# Patient Record
Sex: FEMALE | Race: BLACK OR AFRICAN AMERICAN | NOT HISPANIC OR LATINO
[De-identification: names, ages, dates, MRNs, and addresses within clinical notes are randomized per-mention and may not be internally consistent; named-entity substitution may affect disease eponyms.]

---

## 2017-01-05 ENCOUNTER — APPOINTMENT (OUTPATIENT)
Dept: UROLOGY | Facility: CLINIC | Age: 37
End: 2017-01-05

## 2017-01-24 ENCOUNTER — APPOINTMENT (OUTPATIENT)
Dept: PHYSICAL MEDICINE AND REHAB | Facility: CLINIC | Age: 37
End: 2017-01-24

## 2017-01-24 ENCOUNTER — CHART COPY (OUTPATIENT)
Age: 37
End: 2017-01-24

## 2017-01-24 VITALS
OXYGEN SATURATION: 98 % | DIASTOLIC BLOOD PRESSURE: 70 MMHG | TEMPERATURE: 98.6 F | WEIGHT: 140 LBS | HEIGHT: 65 IN | SYSTOLIC BLOOD PRESSURE: 104 MMHG | BODY MASS INDEX: 23.32 KG/M2 | HEART RATE: 73 BPM

## 2017-02-21 ENCOUNTER — APPOINTMENT (OUTPATIENT)
Dept: PHYSICAL MEDICINE AND REHAB | Facility: CLINIC | Age: 37
End: 2017-02-21

## 2017-02-21 VITALS
TEMPERATURE: 98.6 F | SYSTOLIC BLOOD PRESSURE: 123 MMHG | OXYGEN SATURATION: 96 % | DIASTOLIC BLOOD PRESSURE: 84 MMHG | HEART RATE: 65 BPM

## 2017-02-21 DIAGNOSIS — M79.1 MYALGIA: ICD-10-CM

## 2017-10-10 ENCOUNTER — APPOINTMENT (OUTPATIENT)
Dept: PHYSICAL MEDICINE AND REHAB | Facility: CLINIC | Age: 37
End: 2017-10-10
Payer: MEDICAID

## 2017-10-10 VITALS
OXYGEN SATURATION: 99 % | HEART RATE: 67 BPM | TEMPERATURE: 98.3 F | SYSTOLIC BLOOD PRESSURE: 154 MMHG | DIASTOLIC BLOOD PRESSURE: 97 MMHG

## 2017-10-10 DIAGNOSIS — M25.879 OTHER SPECIFIED JOINT DISORDERS, UNSPECIFIED ANKLE AND FOOT: ICD-10-CM

## 2017-10-10 PROCEDURE — 99213 OFFICE O/P EST LOW 20 MIN: CPT

## 2017-11-14 ENCOUNTER — RX RENEWAL (OUTPATIENT)
Age: 37
End: 2017-11-14

## 2017-12-05 ENCOUNTER — APPOINTMENT (OUTPATIENT)
Dept: PHYSICAL MEDICINE AND REHAB | Facility: CLINIC | Age: 37
End: 2017-12-05
Payer: MEDICAID

## 2017-12-05 ENCOUNTER — CHART COPY (OUTPATIENT)
Age: 37
End: 2017-12-05

## 2017-12-05 PROCEDURE — 95874 GUIDE NERV DESTR NEEDLE EMG: CPT | Mod: LT

## 2017-12-05 PROCEDURE — 64644 CHEMODENERV 1 EXTREM 5/> MUS: CPT

## 2018-01-22 ENCOUNTER — APPOINTMENT (OUTPATIENT)
Dept: PHYSICAL MEDICINE AND REHAB | Facility: CLINIC | Age: 38
End: 2018-01-22
Payer: MEDICAID

## 2018-01-22 VITALS
DIASTOLIC BLOOD PRESSURE: 76 MMHG | OXYGEN SATURATION: 98 % | HEART RATE: 74 BPM | SYSTOLIC BLOOD PRESSURE: 137 MMHG | TEMPERATURE: 98.9 F

## 2018-01-22 PROCEDURE — 99213 OFFICE O/P EST LOW 20 MIN: CPT | Mod: GC

## 2018-03-08 ENCOUNTER — APPOINTMENT (OUTPATIENT)
Dept: PHYSICAL MEDICINE AND REHAB | Facility: CLINIC | Age: 38
End: 2018-03-08
Payer: MEDICAID

## 2018-03-08 VITALS
DIASTOLIC BLOOD PRESSURE: 84 MMHG | SYSTOLIC BLOOD PRESSURE: 126 MMHG | HEART RATE: 94 BPM | TEMPERATURE: 98.3 F | OXYGEN SATURATION: 99 %

## 2018-03-08 PROCEDURE — 99213 OFFICE O/P EST LOW 20 MIN: CPT

## 2018-05-03 ENCOUNTER — APPOINTMENT (OUTPATIENT)
Dept: PHYSICAL MEDICINE AND REHAB | Facility: CLINIC | Age: 38
End: 2018-05-03
Payer: MEDICARE

## 2018-05-03 VITALS
HEART RATE: 70 BPM | DIASTOLIC BLOOD PRESSURE: 81 MMHG | TEMPERATURE: 98 F | OXYGEN SATURATION: 99 % | SYSTOLIC BLOOD PRESSURE: 121 MMHG

## 2018-05-03 PROCEDURE — 95874 GUIDE NERV DESTR NEEDLE EMG: CPT

## 2018-05-03 PROCEDURE — 64644 CHEMODENERV 1 EXTREM 5/> MUS: CPT

## 2018-06-07 ENCOUNTER — CHART COPY (OUTPATIENT)
Age: 38
End: 2018-06-07

## 2018-06-12 ENCOUNTER — CHART COPY (OUTPATIENT)
Age: 38
End: 2018-06-12

## 2018-06-14 ENCOUNTER — APPOINTMENT (OUTPATIENT)
Dept: PHYSICAL MEDICINE AND REHAB | Facility: CLINIC | Age: 38
End: 2018-06-14
Payer: MEDICAID

## 2018-06-14 VITALS — DIASTOLIC BLOOD PRESSURE: 79 MMHG | OXYGEN SATURATION: 99 % | HEART RATE: 76 BPM | SYSTOLIC BLOOD PRESSURE: 114 MMHG

## 2018-06-14 PROCEDURE — 99213 OFFICE O/P EST LOW 20 MIN: CPT

## 2018-08-23 ENCOUNTER — APPOINTMENT (OUTPATIENT)
Dept: PHYSICAL MEDICINE AND REHAB | Facility: CLINIC | Age: 38
End: 2018-08-23
Payer: COMMERCIAL

## 2018-08-23 VITALS — HEART RATE: 81 BPM | SYSTOLIC BLOOD PRESSURE: 128 MMHG | DIASTOLIC BLOOD PRESSURE: 87 MMHG

## 2018-08-23 PROCEDURE — 95874 GUIDE NERV DESTR NEEDLE EMG: CPT

## 2018-08-23 PROCEDURE — 64644 CHEMODENERV 1 EXTREM 5/> MUS: CPT

## 2018-10-08 ENCOUNTER — APPOINTMENT (OUTPATIENT)
Dept: PHYSICAL MEDICINE AND REHAB | Facility: CLINIC | Age: 38
End: 2018-10-08
Payer: COMMERCIAL

## 2018-10-08 ENCOUNTER — RX RENEWAL (OUTPATIENT)
Age: 38
End: 2018-10-08

## 2018-10-08 VITALS — HEART RATE: 88 BPM | DIASTOLIC BLOOD PRESSURE: 96 MMHG | SYSTOLIC BLOOD PRESSURE: 136 MMHG

## 2018-10-08 PROCEDURE — 99213 OFFICE O/P EST LOW 20 MIN: CPT

## 2018-11-26 ENCOUNTER — APPOINTMENT (OUTPATIENT)
Dept: PHYSICAL MEDICINE AND REHAB | Facility: CLINIC | Age: 38
End: 2018-11-26

## 2019-01-14 ENCOUNTER — APPOINTMENT (OUTPATIENT)
Dept: PHYSICAL MEDICINE AND REHAB | Facility: CLINIC | Age: 39
End: 2019-01-14
Payer: COMMERCIAL

## 2019-01-14 VITALS
OXYGEN SATURATION: 97 % | SYSTOLIC BLOOD PRESSURE: 142 MMHG | HEART RATE: 75 BPM | TEMPERATURE: 97.5 F | DIASTOLIC BLOOD PRESSURE: 110 MMHG

## 2019-01-14 PROCEDURE — 64644 CHEMODENERV 1 EXTREM 5/> MUS: CPT

## 2019-01-14 PROCEDURE — 95874 GUIDE NERV DESTR NEEDLE EMG: CPT

## 2019-03-25 ENCOUNTER — APPOINTMENT (OUTPATIENT)
Dept: PHYSICAL MEDICINE AND REHAB | Facility: CLINIC | Age: 39
End: 2019-03-25
Payer: COMMERCIAL

## 2019-03-25 VITALS
SYSTOLIC BLOOD PRESSURE: 129 MMHG | TEMPERATURE: 98.5 F | DIASTOLIC BLOOD PRESSURE: 88 MMHG | HEART RATE: 75 BPM | OXYGEN SATURATION: 98 %

## 2019-03-25 PROCEDURE — 99213 OFFICE O/P EST LOW 20 MIN: CPT

## 2019-03-25 NOTE — REVIEW OF SYSTEMS
[Muscle Weakness] : muscle weakness [Difficulty Walking] : difficulty walking [Negative] : Gastrointestinal [Fever] : no fever [Incontinence] : no incontinence [Joint Pain] : no joint pain

## 2019-03-25 NOTE — HISTORY OF PRESENT ILLNESS
[FreeTextEntry1] : Patient is a 39-year-old female history of brainstem ICH with left spastic hemiparesis, ataxia who a underwent Botox injection on Jan. 14, 2019.. She states that she's able to  objects better with the left hand and she's more confident in the use of her left hand. Patient states that she is using her left hand in more activities and is able to extend her wrist easily. Patient will be restarting outpatient PT/OT.  (+) fall reported 2/2 w/c not locked.  Ambulates with rolling walker with assistance. (A) transfer, ADL.

## 2019-03-25 NOTE — ASSESSMENT
[FreeTextEntry1] : Patient is a 39 roll female history of brainstem ICH with ataxia, left hemiparesis underwent a Botox injection Jan. 14, 2019.  Good response to Botox injection.  Will continue the Botox injection protocol of Jan. 14, 2019 (100 units). Patient to restart outpatient physical therapy, see Rx.

## 2019-03-25 NOTE — PHYSICAL EXAM
[FreeTextEntry1] : General: Well developed female in no apparent distress. Patient is awake, alert, cooperative examination.\par Extremities: No pedal edema.\par \par Motor:\par Right upper extremity/right lower extremity: Active range of motion within functional limits with 5/5 motor power throughout.\par \par Left upper extremity: Significant dysmetria noted on FNF. Patient with isolated active wrist extension, active digit flexion and extension. Patient maintains her thumb a neutral to  extended position. Patient's thumb to digit opposition is intact and able to do this with wrist in extended position. Patient able to fully extend digits with full wrist extension. Hand  4+/5 motor power.  Thumb to digit opposition intact but slow.\par \par Tone: \par Left wrist flexors MAS = 0-1.\par Left FPL MAS = 0 with a wrist flexed, MAS = 0-1 with wrist extended\par Left FPB MAS = 0-1\par Left AP MAS = 0-1\par \par Functional status: Patient ambulated with rolling walker with assistance. Trunk ataxia and wide based gait. Patient ambulated with left foot flat initial contact, right foot flat initial contact with some mild toe strike.

## 2019-04-22 ENCOUNTER — APPOINTMENT (OUTPATIENT)
Dept: PHYSICAL MEDICINE AND REHAB | Facility: CLINIC | Age: 39
End: 2019-04-22
Payer: COMMERCIAL

## 2019-04-22 VITALS — DIASTOLIC BLOOD PRESSURE: 87 MMHG | SYSTOLIC BLOOD PRESSURE: 128 MMHG | HEART RATE: 88 BPM

## 2019-04-22 PROCEDURE — 95874 GUIDE NERV DESTR NEEDLE EMG: CPT

## 2019-04-22 PROCEDURE — 64644 CHEMODENERV 1 EXTREM 5/> MUS: CPT

## 2019-05-31 ENCOUNTER — RX RENEWAL (OUTPATIENT)
Age: 39
End: 2019-05-31

## 2019-06-12 ENCOUNTER — APPOINTMENT (OUTPATIENT)
Dept: PHYSICAL MEDICINE AND REHAB | Facility: CLINIC | Age: 39
End: 2019-06-12
Payer: MEDICARE

## 2019-06-12 VITALS — HEART RATE: 78 BPM | SYSTOLIC BLOOD PRESSURE: 128 MMHG | OXYGEN SATURATION: 98 % | DIASTOLIC BLOOD PRESSURE: 89 MMHG

## 2019-06-12 PROCEDURE — 99213 OFFICE O/P EST LOW 20 MIN: CPT

## 2019-06-12 NOTE — REVIEW OF SYSTEMS
[Muscle Weakness] : muscle weakness [Difficulty Walking] : difficulty walking [Negative] : Respiratory [Incontinence] : no incontinence [Fever] : no fever [Joint Pain] : no joint pain

## 2019-06-12 NOTE — ASSESSMENT
[FreeTextEntry1] : Patient is a 38 y/o female history of brainstem ICH with ataxia, left hemiparesis underwent a Botox injection April 22, 2019.  Good response to Botox injection.  Will continue the Botox injection protocol of April 22, 2019 (100 units). Patient to restart outpatient OT/PT, see Rx. Will get feedback from OT concerning adjustment in dose.

## 2019-06-12 NOTE — HISTORY OF PRESENT ILLNESS
[FreeTextEntry1] : Patient is a 39-year-old female history of brainstem ICH with left spastic hemiparesis, ataxia who underwent a Botox injection on April 22, 2019.. She states that she's able to  objects better with the left hand and no pain.  Patient states that she is using her left hand in more activities and is able to extend her wrist easily. No improvement in coordination. Patient will be restarting outpatient PT/OT.  No falls reported.  Ambulates with rolling walker with assistance. (A) transfer, ADL.

## 2019-06-12 NOTE — PHYSICAL EXAM
[FreeTextEntry1] : General: Well developed female in no apparent distress. Patient is awake, alert, cooperative examination.\par Extremities: No pedal edema.\par \par Motor:\par Right upper extremity/right lower extremity: Active range of motion within functional limits with 5/5 motor power throughout.\par \par Left upper extremity: Significant dysmetria noted on FNF. Patient with isolated active wrist extension, active digit flexion and extension. Patient maintains her thumb a neutral to  extended position. Patient's thumb to digit opposition is intact and able to do this with wrist in extended position. Patient able to fully extend digits with full wrist extension. Hand  4+/5 motor power.  Thumb to digit opposition intact but slow.\par Left shoulder flexion to 110deg, 4/5mp\par \par Tone: \par Left wrist flexors MAS = 0.\par Left FPL MAS = 0 with a wrist flexed, MAS = 0 with wrist extended\par Left FPB MAS = 0\par Left AP MAS = 0\par \par Functional status: Patient ambulated with rolling walker with assistance. Trunk ataxia and wide based gait. Patient ambulated with left foot flat initial contact, right foot flat initial contact with some mild toe strike.

## 2019-07-31 ENCOUNTER — APPOINTMENT (OUTPATIENT)
Dept: PHYSICAL MEDICINE AND REHAB | Facility: CLINIC | Age: 39
End: 2019-07-31

## 2019-09-19 ENCOUNTER — APPOINTMENT (OUTPATIENT)
Dept: PHYSICAL MEDICINE AND REHAB | Facility: CLINIC | Age: 39
End: 2019-09-19
Payer: MEDICARE

## 2019-09-19 ENCOUNTER — CHART COPY (OUTPATIENT)
Age: 39
End: 2019-09-19

## 2019-09-19 VITALS — DIASTOLIC BLOOD PRESSURE: 102 MMHG | OXYGEN SATURATION: 100 % | HEART RATE: 74 BPM | SYSTOLIC BLOOD PRESSURE: 149 MMHG

## 2019-09-19 PROCEDURE — 95874 GUIDE NERV DESTR NEEDLE EMG: CPT

## 2019-09-19 PROCEDURE — 64644 CHEMODENERV 1 EXTREM 5/> MUS: CPT

## 2019-11-04 ENCOUNTER — APPOINTMENT (OUTPATIENT)
Dept: PHYSICAL MEDICINE AND REHAB | Facility: CLINIC | Age: 39
End: 2019-11-04
Payer: MEDICARE

## 2019-11-04 VITALS — SYSTOLIC BLOOD PRESSURE: 133 MMHG | DIASTOLIC BLOOD PRESSURE: 87 MMHG | HEART RATE: 77 BPM | OXYGEN SATURATION: 98 %

## 2019-11-04 PROCEDURE — 99213 OFFICE O/P EST LOW 20 MIN: CPT

## 2019-11-04 NOTE — HISTORY OF PRESENT ILLNESS
[FreeTextEntry1] : Patient is a 39-year-old female history of brainstem ICH with left spastic hemiparesis, ataxia who underwent a Botox injection on Sept. 19, 2019.. She states that she's able to  objects better with the left hand and no pain.  Patient states that she is using her left hand in more activities and is able to extend her wrist easily. No improvement in coordination. Patient will be restarting outpatient PT/OT in Jan..  No falls reported.  Ambulates with rolling walker with assistance. (A) transfer, ADL.

## 2019-11-04 NOTE — ASSESSMENT
[FreeTextEntry1] : Patient is a 40 y/o female history of brainstem ICH with ataxia, left hemiparesis underwent a Botox injection Sept 19, 2019.  Good response to Botox injection, possible weakness at left FPL inhibiting pincer ..  Will decrease dose to FPL and increase AP. Prescription provided for a right heel lift 3/8" to improve clearance of the left lower extremity. Discuss with patient that the weighted walker is helping to stabilize her ataxia. New Botox injection protocol: \par \par Left FPL----- 20 units\par Left FPB----- 25 units\par Left AP------ 25 units\par Left FCR----- 15 units\par Left FCU----- 15 units\par \par Total ---------100 units

## 2019-11-04 NOTE — PHYSICAL EXAM
[FreeTextEntry1] : General: Well developed female in no apparent distress. Patient is awake, alert, cooperative examination.\par Extremities: No pedal edema.\par \par Motor:\par Right upper extremity/right lower extremity: Active range of motion within functional limits with 5/5 motor power throughout.\par \par Left upper extremity: Significant dysmetria noted on FNF. Patient with isolated active wrist extension, active digit flexion and extension. Patient maintains her thumb an adducted toneutral and extended at IP joint. Patient's thumb to digit opposition is intact and able to do this with wrist in extended position. Patient able to fully extend digits with full wrist extension. Hand  4+/5 motor power.  Thumb to digit opposition intact but slow. Left FPL 4/5mp \par Left shoulder flexion to 110deg, 4/5mp\par \par Tone: \par Left wrist flexors MAS = 0.\par Left FPL MAS = 0 with a wrist flexed, MAS = 0 with wrist extended\par Left FPB MAS = 0\par Left AP MAS = 0\par \par Functional status: Patient ambulated with rolling walker with assistance with left hinged AFO with good HS and some episodic toe catch at the initiation of swing phase on left. Toe catch eliminated with 3/8" lift on right. Trunk ataxia and wide based gait.

## 2020-01-02 ENCOUNTER — APPOINTMENT (OUTPATIENT)
Dept: PHYSICAL MEDICINE AND REHAB | Facility: CLINIC | Age: 40
End: 2020-01-02

## 2020-01-16 ENCOUNTER — APPOINTMENT (OUTPATIENT)
Dept: PHYSICAL MEDICINE AND REHAB | Facility: CLINIC | Age: 40
End: 2020-01-16
Payer: MEDICARE

## 2020-01-16 VITALS — HEART RATE: 73 BPM | OXYGEN SATURATION: 97 % | DIASTOLIC BLOOD PRESSURE: 94 MMHG | SYSTOLIC BLOOD PRESSURE: 136 MMHG

## 2020-01-16 PROCEDURE — 64644 CHEMODENERV 1 EXTREM 5/> MUS: CPT

## 2020-01-16 PROCEDURE — 95874 GUIDE NERV DESTR NEEDLE EMG: CPT

## 2020-03-26 ENCOUNTER — APPOINTMENT (OUTPATIENT)
Dept: PHYSICAL MEDICINE AND REHAB | Facility: CLINIC | Age: 40
End: 2020-03-26

## 2020-07-02 ENCOUNTER — APPOINTMENT (OUTPATIENT)
Dept: PHYSICAL MEDICINE AND REHAB | Facility: CLINIC | Age: 40
End: 2020-07-02
Payer: MEDICARE

## 2020-07-02 VITALS
TEMPERATURE: 98.1 F | HEART RATE: 71 BPM | OXYGEN SATURATION: 96 % | DIASTOLIC BLOOD PRESSURE: 88 MMHG | SYSTOLIC BLOOD PRESSURE: 146 MMHG

## 2020-07-02 PROCEDURE — 99213 OFFICE O/P EST LOW 20 MIN: CPT | Mod: GC

## 2020-07-02 NOTE — REVIEW OF SYSTEMS
[Difficulty Walking] : difficulty walking [Muscle Weakness] : muscle weakness [Negative] : Respiratory [Fever] : no fever [Incontinence] : no incontinence [Joint Pain] : no joint pain

## 2020-07-02 NOTE — ASSESSMENT
[FreeTextEntry1] : Patient is a 39 y/o female history of brainstem ICH with ataxia, left hemiparesis underwent a Botox injection Jan. 16, 2020.  Reported good response to Botox injection, but no 5 week f/u apt. Will continue Botox injection protocol: \par \par Left FPL----- 20 units\par Left FPB----- 25 units\par Left AP------ 25 units\par Left FCR----- 15 units\par Left FCU----- 15 units\par \par Total ---------100 units

## 2020-07-02 NOTE — HISTORY OF PRESENT ILLNESS
[FreeTextEntry1] : Patient is a 40-year-old female history of brainstem ICH with left spastic hemiparesis, ataxia who underwent a Botox injection on Jan. 16, 2020.. She states that she's able to  objects better with the left hand and no pain after her Botox injection. Patient was not able to f/u due to the COVID-19 pandemic.  Patient has noted some cramp/spasm episodes on the left hand over the past 3-4 weeks. Patient has participated in an outpatient PT during the COVID-19 pandemic. No falls reported. Patient ambulates with a rolling walker with assistance, assistance with transfers,  ADLs.

## 2020-07-09 ENCOUNTER — APPOINTMENT (OUTPATIENT)
Dept: PHYSICAL MEDICINE AND REHAB | Facility: CLINIC | Age: 40
End: 2020-07-09

## 2020-07-16 ENCOUNTER — APPOINTMENT (OUTPATIENT)
Dept: PHYSICAL MEDICINE AND REHAB | Facility: CLINIC | Age: 40
End: 2020-07-16
Payer: MEDICARE

## 2020-07-16 VITALS
HEART RATE: 79 BPM | TEMPERATURE: 96.8 F | SYSTOLIC BLOOD PRESSURE: 147 MMHG | DIASTOLIC BLOOD PRESSURE: 53 MMHG | OXYGEN SATURATION: 97 %

## 2020-07-16 PROCEDURE — 95874 GUIDE NERV DESTR NEEDLE EMG: CPT

## 2020-07-16 PROCEDURE — 64644 CHEMODENERV 1 EXTREM 5/> MUS: CPT

## 2020-08-25 ENCOUNTER — APPOINTMENT (OUTPATIENT)
Dept: PHYSICAL MEDICINE AND REHAB | Facility: CLINIC | Age: 40
End: 2020-08-25
Payer: MEDICARE

## 2020-08-25 VITALS — BODY MASS INDEX: 27.96 KG/M2 | WEIGHT: 168 LBS

## 2020-08-25 VITALS
DIASTOLIC BLOOD PRESSURE: 85 MMHG | TEMPERATURE: 97.6 F | HEART RATE: 87 BPM | SYSTOLIC BLOOD PRESSURE: 123 MMHG | OXYGEN SATURATION: 98 %

## 2020-08-25 PROCEDURE — 99214 OFFICE O/P EST MOD 30 MIN: CPT

## 2020-08-25 NOTE — REVIEW OF SYSTEMS
[Muscle Weakness] : muscle weakness [Negative] : Gastrointestinal [Skin Wound] : no skin wound [Joint Pain] : no joint pain [Fever] : no fever

## 2020-08-25 NOTE — ASSESSMENT
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 Eli Gu cannot walk in her home using any ambulation device. She requires a manual wheelchair so they can perform their mobility related activities of daily living (MRADL), like getting into the bathroom for hygiene, into the kitchen for eating and into the bedroom for resting. Patient requires an ultralight manual wheelchair because she cannot walk, has weakness and endurance issues that require her to need an ultra lightweight manual wheelchair with camber and an axle plate that brings the wheels forward to allow her to reach them effectively and without pain.

## 2020-08-25 NOTE — PHYSICAL EXAM
[FreeTextEntry1] : General: Well developed female in no apparent distress. Patient is awake, alert, cooperative examination.\par Extremities: No pedal edema.\par \par CN: no facial asymmetry, EOMI impaired on right lateral gaze. Tongue midline.\par Cerebellar: (+)nystagmus on vertical gaze. (+) dysmetria FNF (Lt>>Rt)\par \par Motor:\par Right upper extremity/right lower extremity: Active range of motion within functional limits with 5/5 motor power throughout. (+)TDO.\par \par Left upper extremity: Patient with isolated active wrist extension, active digit flexion and extension. Patient maintains her thumb an adducted position and extended at IP joint. Patient's thumb to digit opposition is intact and able to do this with wrist in extended position. Patient able to fully extend digits with full wrist extension. Hand  4+/5 motor power.  Thumb to digit opposition intact but slow. Left FPL 4/5mp \par Left shoulder flexion to 110deg, 4/5mp\par \par Tone: \par Left pronators MAS=1\par Left wrist flexors MAS = 0.\par Left FPL MAS = 0 with a wrist flexed, MAS = 0 with wrist extended\par Left FPB MAS = 0\par Left AP MAS = 0\par Left FDS MAS=1\par \par Sensory: decreased to LT LUE/LLE. Prop intact right thumb. Prop absent left wrist, thumb, and knee.\par MSR: +1/2 right KJ, +3 left KJ\par \par Functional status: Patient (A) sit to stand transfer. Trunk ataxia present.

## 2020-08-25 NOTE — HISTORY OF PRESENT ILLNESS
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 Patient lives with her cousin in a private house with a three-step entry. Patient is not ambulatory in the home and spends her time in a manual wheelchair. Patient requires assistance with wheelchair to commode transfers and wheelchair to bed transfers. Patient requires assistance with meal prep and self feeds. Patient independent with handwashing in the wheelchair. Patient requires assistance to negotiate the three-step entry to her home using a handrail. Home health aide is present 6 hours a day/5 days per week.

## 2020-11-02 ENCOUNTER — APPOINTMENT (OUTPATIENT)
Dept: PHYSICAL MEDICINE AND REHAB | Facility: CLINIC | Age: 40
End: 2020-11-02
Payer: MEDICARE

## 2020-11-02 VITALS
TEMPERATURE: 98.1 F | SYSTOLIC BLOOD PRESSURE: 136 MMHG | OXYGEN SATURATION: 99 % | HEART RATE: 80 BPM | DIASTOLIC BLOOD PRESSURE: 89 MMHG

## 2020-11-02 DIAGNOSIS — M21.42 FLAT FOOT [PES PLANUS] (ACQUIRED), RIGHT FOOT: ICD-10-CM

## 2020-11-02 DIAGNOSIS — L84 CORNS AND CALLOSITIES: ICD-10-CM

## 2020-11-02 DIAGNOSIS — M21.41 FLAT FOOT [PES PLANUS] (ACQUIRED), RIGHT FOOT: ICD-10-CM

## 2020-11-02 PROCEDURE — 99214 OFFICE O/P EST MOD 30 MIN: CPT

## 2020-11-02 RX ORDER — MONTELUKAST 10 MG/1
10 TABLET, FILM COATED ORAL
Qty: 30 | Refills: 0 | Status: ACTIVE | COMMUNITY
Start: 2020-10-20

## 2020-11-02 NOTE — HISTORY OF PRESENT ILLNESS
[FreeTextEntry1] : Patient is a 40-year-old female history of a brainstem ICH with left spastic hemiparesis, ataxia who underwent a Botox injection on July 16, 2020. Patient's main complaint today is pain at her left foot. Patient  reports a feeling that there is a 'small ball at the plantar surface by the metatarsal heads which gives her pain during ambulation. Patient also notes some toe curling of the left foot, however this causes minimal discomfort. Patient also reports that she's gained approximately 10 pounds over the past year and has noted some edema or at the left ankle which has made the brace tight and she's held using it for the past one week. No skin breakdown or redness reported. Patient was seen by vascular surgery and a Doppler is scheduled for tomorrow to rule out DVT. Patient feels that her Botox injection has worn off. She's been describing some cramping pain in her hands episodically and feels that her left hand is more clumsy and having difficulty grasping items with the left hand since the Botox wore off. Patient can use her left hand to zipper her jacket but mostly uses it to grossly hold objects for her right hand. Patient reports receiving her new wheelchair approximately one week ago which is comfortable but she's looking to getting the height increased.

## 2020-11-02 NOTE — PHYSICAL EXAM
[FreeTextEntry1] : General: Well developed female in no apparent distress. Patient is awake, alert, cooperative examination.\par Extremities: mild edema at left ankle, no eythema or warmth. (+)1cm callus at left 3rd metatarsal head which is tender to palpation. (+)pes planus (B)\par \par Motor:\par Right upper extremity/right lower extremity: Active range of motion within functional limits with 5/5 motor power throughout.\par \par Left upper extremity: Significant dysmetria noted on FNF. Patient with isolated active wrist extension, active digit flexion and extension. Patient maintains her thumb an adducted position and extended at IP joint. Patient's thumb to digit opposition is intact and able to do this with wrist in extended position. Patient able to fully extend digits with full wrist extension. Hand  4+/5 motor power.  Thumb to digit opposition intact but slow. Left FPL 4/5mp \par Left shoulder flexion to 110deg, 4/5mp. During TDO patientt maintains thumb in excessive adduction and IP joint flexed.\par \par Tone: \par Left wrist flexors MAS = 0.\par Left FPL MAS = 0 with a wrist flexed, MAS = 0 with wrist extended\par Left FPB MAS = 0\par Left AP MAS = 0\par Left FDS MAS=1\par \par Left toe flexors MAS=0 in PF; MAS=1+-2 in DF; MAS= 2+-3 with standing, hammer toe digit 4\par \par Functional status: Patient ambulated with rolling walker with assistance with left hinged AFO with good HS and clearance of LLE. Trunk ataxia and wide based gait. AFO mildly tight at ankle joints.

## 2020-11-02 NOTE — ASSESSMENT
[FreeTextEntry1] : Patient is a 40-year-old female history of a brainstem ICH with left spastic hemiparesis, ataxia with complaints of left foot pain at a callous noted on the plantar surface of the third metatarsal head. Prescription provided to add foam lining to the entire footplate of her AFO and for  to provide mild relief at the ankles, see RX. Referred patient to podiatry to assess callus on left foot. Patient with some complaints of toe curling which is causing minimal pain. Discussed option of Botox injection, however will hold option due to mild to no discomfort. Will continue the Botox injection protocol of July 16, 2020 (1 vial)

## 2020-11-12 ENCOUNTER — APPOINTMENT (OUTPATIENT)
Dept: PHYSICAL MEDICINE AND REHAB | Facility: CLINIC | Age: 40
End: 2020-11-12
Payer: MEDICARE

## 2020-11-12 VITALS
HEART RATE: 71 BPM | SYSTOLIC BLOOD PRESSURE: 106 MMHG | OXYGEN SATURATION: 95 % | DIASTOLIC BLOOD PRESSURE: 78 MMHG | TEMPERATURE: 96.7 F

## 2020-11-12 DIAGNOSIS — G81.14 SPASTIC HEMIPLEGIA AFFECTING LEFT NONDOMINANT SIDE: ICD-10-CM

## 2020-11-12 PROCEDURE — 64644 CHEMODENERV 1 EXTREM 5/> MUS: CPT

## 2020-11-12 PROCEDURE — 95874 GUIDE NERV DESTR NEEDLE EMG: CPT

## 2021-01-19 NOTE — REVIEW OF SYSTEMS
[Muscle Weakness] : muscle weakness [Difficulty Walking] : difficulty walking [Negative] : Gastrointestinal [Fever] : no fever [Skin Wound] : no skin wound Statement Selected

## 2021-11-24 ENCOUNTER — INPATIENT (INPATIENT)
Facility: HOSPITAL | Age: 41
LOS: 8 days | Discharge: HOME | DRG: 287 | End: 2021-12-03
Attending: INTERNAL MEDICINE | Admitting: INTERNAL MEDICINE
Payer: MEDICARE

## 2021-11-24 VITALS
OXYGEN SATURATION: 95 % | SYSTOLIC BLOOD PRESSURE: 137 MMHG | DIASTOLIC BLOOD PRESSURE: 99 MMHG | RESPIRATION RATE: 18 BRPM | TEMPERATURE: 98 F | HEART RATE: 88 BPM

## 2021-11-24 DIAGNOSIS — I25.41 CORONARY ARTERY ANEURYSM: ICD-10-CM

## 2021-11-24 DIAGNOSIS — R07.9 CHEST PAIN, UNSPECIFIED: ICD-10-CM

## 2021-11-24 DIAGNOSIS — Z98.890 OTHER SPECIFIED POSTPROCEDURAL STATES: Chronic | ICD-10-CM

## 2021-11-24 DIAGNOSIS — I10 ESSENTIAL (PRIMARY) HYPERTENSION: ICD-10-CM

## 2021-11-24 LAB
ALBUMIN SERPL ELPH-MCNC: 4.2 G/DL — SIGNIFICANT CHANGE UP (ref 3.3–5)
ALP SERPL-CCNC: 64 U/L — SIGNIFICANT CHANGE UP (ref 40–120)
ALT FLD-CCNC: 9 U/L — LOW (ref 10–45)
ANION GAP SERPL CALC-SCNC: 14 MMOL/L — SIGNIFICANT CHANGE UP (ref 5–17)
APPEARANCE UR: CLEAR — SIGNIFICANT CHANGE UP
APTT BLD: 35 SEC — SIGNIFICANT CHANGE UP (ref 27.5–35.5)
AST SERPL-CCNC: 13 U/L — SIGNIFICANT CHANGE UP (ref 10–40)
BILIRUB SERPL-MCNC: 0.4 MG/DL — SIGNIFICANT CHANGE UP (ref 0.2–1.2)
BILIRUB UR-MCNC: NEGATIVE — SIGNIFICANT CHANGE UP
BLD GP AB SCN SERPL QL: NEGATIVE — SIGNIFICANT CHANGE UP
BUN SERPL-MCNC: 9 MG/DL — SIGNIFICANT CHANGE UP (ref 7–23)
CALCIUM SERPL-MCNC: 9.5 MG/DL — SIGNIFICANT CHANGE UP (ref 8.4–10.5)
CHLORIDE SERPL-SCNC: 100 MMOL/L — SIGNIFICANT CHANGE UP (ref 96–108)
CK MB CFR SERPL CALC: 1 NG/ML — SIGNIFICANT CHANGE UP (ref 0–3.8)
CK SERPL-CCNC: 62 U/L — SIGNIFICANT CHANGE UP (ref 25–170)
CO2 SERPL-SCNC: 23 MMOL/L — SIGNIFICANT CHANGE UP (ref 22–31)
COLOR SPEC: SIGNIFICANT CHANGE UP
CREAT SERPL-MCNC: 0.84 MG/DL — SIGNIFICANT CHANGE UP (ref 0.5–1.3)
DIFF PNL FLD: ABNORMAL
FIBRINOGEN PPP-MCNC: 507 MG/DL — SIGNIFICANT CHANGE UP (ref 290–520)
GLUCOSE SERPL-MCNC: 79 MG/DL — SIGNIFICANT CHANGE UP (ref 70–99)
GLUCOSE UR QL: NEGATIVE — SIGNIFICANT CHANGE UP
HCG SERPL-ACNC: <2 MIU/ML — SIGNIFICANT CHANGE UP
HCT VFR BLD CALC: 36.1 % — SIGNIFICANT CHANGE UP (ref 34.5–45)
HGB BLD-MCNC: 11.1 G/DL — LOW (ref 11.5–15.5)
INR BLD: 1 RATIO — SIGNIFICANT CHANGE UP (ref 0.88–1.16)
KETONES UR-MCNC: ABNORMAL
LEUKOCYTE ESTERASE UR-ACNC: NEGATIVE — SIGNIFICANT CHANGE UP
MCHC RBC-ENTMCNC: 26.1 PG — LOW (ref 27–34)
MCHC RBC-ENTMCNC: 30.7 GM/DL — LOW (ref 32–36)
MCV RBC AUTO: 84.7 FL — SIGNIFICANT CHANGE UP (ref 80–100)
NITRITE UR-MCNC: NEGATIVE — SIGNIFICANT CHANGE UP
NRBC # BLD: 0 /100 WBCS — SIGNIFICANT CHANGE UP (ref 0–0)
NT-PROBNP SERPL-SCNC: 73 PG/ML — SIGNIFICANT CHANGE UP (ref 0–300)
PH UR: 6 — SIGNIFICANT CHANGE UP (ref 5–8)
PLATELET # BLD AUTO: 227 K/UL — SIGNIFICANT CHANGE UP (ref 150–400)
POTASSIUM SERPL-MCNC: 3.9 MMOL/L — SIGNIFICANT CHANGE UP (ref 3.5–5.3)
POTASSIUM SERPL-SCNC: 3.9 MMOL/L — SIGNIFICANT CHANGE UP (ref 3.5–5.3)
PROT SERPL-MCNC: 7.3 G/DL — SIGNIFICANT CHANGE UP (ref 6–8.3)
PROT UR-MCNC: ABNORMAL
PROTHROM AB SERPL-ACNC: 12 SEC — SIGNIFICANT CHANGE UP (ref 10.6–13.6)
RBC # BLD: 4.26 M/UL — SIGNIFICANT CHANGE UP (ref 3.8–5.2)
RBC # FLD: 14 % — SIGNIFICANT CHANGE UP (ref 10.3–14.5)
RH IG SCN BLD-IMP: POSITIVE — SIGNIFICANT CHANGE UP
SODIUM SERPL-SCNC: 137 MMOL/L — SIGNIFICANT CHANGE UP (ref 135–145)
SP GR SPEC: 1.04 — HIGH (ref 1.01–1.02)
TROPONIN T, HIGH SENSITIVITY RESULT: 8 NG/L — SIGNIFICANT CHANGE UP (ref 0–51)
UROBILINOGEN FLD QL: NEGATIVE — SIGNIFICANT CHANGE UP
WBC # BLD: 6.79 K/UL — SIGNIFICANT CHANGE UP (ref 3.8–10.5)
WBC # FLD AUTO: 6.79 K/UL — SIGNIFICANT CHANGE UP (ref 3.8–10.5)

## 2021-11-24 PROCEDURE — 93306 TTE W/DOPPLER COMPLETE: CPT | Mod: 26

## 2021-11-24 PROCEDURE — 71045 X-RAY EXAM CHEST 1 VIEW: CPT | Mod: 26

## 2021-11-24 PROCEDURE — 99223 1ST HOSP IP/OBS HIGH 75: CPT

## 2021-11-24 PROCEDURE — 99222 1ST HOSP IP/OBS MODERATE 55: CPT

## 2021-11-24 RX ORDER — INFLUENZA VIRUS VACCINE 15; 15; 15; 15 UG/.5ML; UG/.5ML; UG/.5ML; UG/.5ML
0.5 SUSPENSION INTRAMUSCULAR ONCE
Refills: 0 | Status: DISCONTINUED | OUTPATIENT
Start: 2021-11-24 | End: 2021-12-03

## 2021-11-24 RX ORDER — ASPIRIN/CALCIUM CARB/MAGNESIUM 324 MG
1 TABLET ORAL
Qty: 0 | Refills: 0 | DISCHARGE

## 2021-11-24 RX ORDER — ATENOLOL 25 MG/1
1 TABLET ORAL
Qty: 0 | Refills: 0 | DISCHARGE

## 2021-11-24 RX ORDER — ATENOLOL 25 MG/1
50 TABLET ORAL DAILY
Refills: 0 | Status: DISCONTINUED | OUTPATIENT
Start: 2021-11-24 | End: 2021-11-28

## 2021-11-24 RX ORDER — HEPARIN SODIUM 5000 [USP'U]/ML
5000 INJECTION INTRAVENOUS; SUBCUTANEOUS EVERY 8 HOURS
Refills: 0 | Status: DISCONTINUED | OUTPATIENT
Start: 2021-11-24 | End: 2021-11-29

## 2021-11-24 RX ORDER — AMLODIPINE BESYLATE 2.5 MG/1
1 TABLET ORAL
Qty: 0 | Refills: 0 | DISCHARGE

## 2021-11-24 RX ORDER — MONTELUKAST 4 MG/1
10 TABLET, CHEWABLE ORAL DAILY
Refills: 0 | Status: DISCONTINUED | OUTPATIENT
Start: 2021-11-24 | End: 2021-12-03

## 2021-11-24 RX ORDER — AMLODIPINE BESYLATE 2.5 MG/1
10 TABLET ORAL DAILY
Refills: 0 | Status: DISCONTINUED | OUTPATIENT
Start: 2021-11-24 | End: 2021-12-03

## 2021-11-24 RX ORDER — ASPIRIN/CALCIUM CARB/MAGNESIUM 324 MG
81 TABLET ORAL DAILY
Refills: 0 | Status: DISCONTINUED | OUTPATIENT
Start: 2021-11-24 | End: 2021-12-03

## 2021-11-24 RX ORDER — MONTELUKAST 4 MG/1
1 TABLET, CHEWABLE ORAL
Qty: 0 | Refills: 0 | DISCHARGE

## 2021-11-24 RX ORDER — SODIUM CHLORIDE 9 MG/ML
3 INJECTION INTRAMUSCULAR; INTRAVENOUS; SUBCUTANEOUS EVERY 8 HOURS
Refills: 0 | Status: DISCONTINUED | OUTPATIENT
Start: 2021-11-24 | End: 2021-12-03

## 2021-11-24 RX ADMIN — SODIUM CHLORIDE 3 MILLILITER(S): 9 INJECTION INTRAMUSCULAR; INTRAVENOUS; SUBCUTANEOUS at 20:33

## 2021-11-24 RX ADMIN — HEPARIN SODIUM 5000 UNIT(S): 5000 INJECTION INTRAVENOUS; SUBCUTANEOUS at 21:24

## 2021-11-24 RX ADMIN — AMLODIPINE BESYLATE 10 MILLIGRAM(S): 2.5 TABLET ORAL at 20:10

## 2021-11-24 RX ADMIN — Medication 0.1 MILLIGRAM(S): at 20:10

## 2021-11-24 NOTE — H&P ADULT - NSHPSOCIALHISTORY_GEN_ALL_CORE
Marital Status: Single  Occupation: Disability  Lives with:     Substance Use : Pt denies  Tobacco Usage:  Former smoker 1PPD x 15 years  Alcohol Usage: Pt denies Marital Status: Single  Occupation: Disability  Lives with: Caregiver (Cousin)    Substance Use : Pt denies  Tobacco Usage:  Former smoker 1PPD x 15 years  Alcohol Usage: Pt denies

## 2021-11-24 NOTE — H&P ADULT - PROBLEM SELECTOR PLAN 1
Continue asa 81 daily  Continue pre-op cardiac surgery workup  Dr. Casanova to review cardiac imaging from [facility]  Check TTE Continue asa 81 daily  Continue pre-op cardiac surgery workup  Dr. Casanova to review cardiac imaging from [facility]  Check TTE & PFTs  DVT prophylaxis on SQ Heparin

## 2021-11-24 NOTE — H&P ADULT - NSHPPHYSICALEXAM_GEN_ALL_CORE
General: NAD  HEENT:  NC/AT  Neuro: A&Ox4, no focal deficits noted  Respiratory: B/L BS CTA, no wheeze, no rhonchi noted  Cardiovascular: RRR, normal S1S2  GI: Abd soft, NT/ND, +BSx4Q   Peripheral Vascular:  B/L LE neg edema, 2+ peripheral pulses, no cyanosis, varicosities/PVD noted  Musculoskeletal: Moves all extremities, LUE/LLE 5/5 strength, RUE/RLE 3/5 strength  Psychiatric: Normal mood, normal affect observed  Skin: Normal exam to inspection and palpation. General: NAD  HEENT:  NC/AT  Neuro: A&Ox4, no focal deficits noted  Respiratory: B/L BS CTA, no wheeze, no rhonchi noted  Cardiovascular: RRR, normal S1S2  GI: Abd soft, NT/ND, +BSx4Q   Peripheral Vascular:  B/L LE neg edema, 2+ peripheral pulses, no cyanosis, varicosities/PVD noted  Musculoskeletal: Moves all extremities, LUE/LLE 5/5 strength, RUE/RLE 2/5 strength  Psychiatric: Normal mood, normal affect observed  Skin: Normal exam to inspection and palpation. General: NAD  HEENT:  NC/AT  Neuro: A&Ox4  Respiratory: B/L BS CTA, no wheeze, no rhonchi noted  Cardiovascular: RRR, normal S1S2  GI: Abd soft, NT/ND, +BSx4Q   Peripheral Vascular:  B/L LE neg edema, 2+ peripheral pulses, no cyanosis, varicosities/PVD noted  Musculoskeletal: Moves all extremities, LUE/LLE 5/5 strength, RUE/RLE 2/5 strength  Psychiatric: Normal mood, normal affect observed  Skin: Normal exam to inspection and palpation. General: NAD  HEENT:  NC/AT  Neuro: A&Ox3, +ataxic gait, +nystagmus   Respiratory: B/L BS CTA, no wheeze, no rhonchi noted  Cardiovascular: RRR, normal S1S2  GI: Abd soft, NT/ND, +BSx4Q   Peripheral Vascular:  B/L LE neg edema, 2+ peripheral pulses, no cyanosis, varicosities/PVD noted  Musculoskeletal: Moves all extremities, LUE/LLE 4/5 strength, RUE/RLE 2/5 strength  Psychiatric: Normal mood, normal affect observed  Skin: Normal exam to inspection and palpation.

## 2021-11-24 NOTE — H&P ADULT - HISTORY OF PRESENT ILLNESS
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 CTA: LMCA, originates from the left coronary sinus of Valsalva and thereafter appears to supply a large aneurysmal dilatation located anterior and superior to LV. Calcification and possible thrombus visualized within the wall of this dilated aneurysmal portion which appears to compress the left atrium. RCA: dominant, demonstrates dilatation in the proximal portions of the vessel but appears patent. Patient had recent admission for chest pain. She currently has no complaints and denies any CP, fever, chills, N/V, SOB, abdominal pain. 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 CTA: LMCA, originates from the left coronary sinus of Valsalva and thereafter appears to supply a large aneurysmal dilatation located anterior and superior to LV. Calcification and possible thrombus visualized within the wall of this dilated aneurysmal portion which appears to compress the left atrium. RCA: dominant, demonstrates dilatation in the proximal portions of the vessel but appears patent. Patient had recent admission for chest pain, MI ruled out. Patient now transferred to [facility] for CT Surgery evaluation. She currently has no complaints and denies any CP, fever, chills, N/V, SOB, abdominal pain. 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 CTA: LMCA, originates from the left coronary sinus of Valsalva and thereafter appears to supply a large aneurysmal dilatation located anterior and superior to LV. Calcification and possible thrombus visualized within the wall of this dilated aneurysmal portion which appears to compress the left atrium. RCA: dominant, demonstrates dilatation in the proximal portions of the vessel but appears patent. Patient had recent admission for chest pain, MI ruled out. Patient now transferred to [facility] for CT Surgery evaluation. She currently has no complaints and denies any CP, fever, chills, N/V, SOB, abdominal pain.

## 2021-11-24 NOTE — H&P ADULT - ASSESSMENT
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 CTA: LMCA, originates from the left coronary sinus of Valsalva and thereafter appears to supply a large aneurysmal dilatation located anterior and superior to LV. Calcification and possible thrombus visualized within the wall of this dilated aneurysmal portion which appears to compress the left atrium. RCA: dominant, demonstrates dilatation in the proximal portions of the vessel but appears patent. Patient had recent admission for chest pain, MI ruled out. Patient now transferred to [facility] for CT Surgery evaluation.  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 CTA: LMCA, originates from the left coronary sinus of Valsalva and thereafter appears to supply a large aneurysmal dilatation located anterior and superior to LV. Calcification and possible thrombus visualized within the wall of this dilated aneurysmal portion which appears to compress the left atrium. RCA: dominant, demonstrates dilatation in the proximal portions of the vessel but appears patent. Patient had recent admission for chest pain, MI ruled out. Patient now transferred to [facility] for CT Surgery evaluation.  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 CTA: LMCA, originates from the left coronary sinus of Valsalva and thereafter appears to supply a large aneurysmal dilatation located anterior and superior to LV. Calcification and possible thrombus visualized within the wall of this dilated aneurysmal portion which appears to compress the left atrium. RCA: dominant, demonstrates dilatation in the proximal portions of the vessel but appears patent. Patient had recent admission for chest pain, MI ruled out. Patient now transferred to [facility] for CT Surgery evaluation for possible coronary aneurysm repair.

## 2021-11-24 NOTE — H&P ADULT - ATTENDING COMMENTS
Pt seen and examined.  Coronary artery aneurysms.  ACS presentation.  Pt will be evaluated for possible transplant

## 2021-11-25 LAB
A1C WITH ESTIMATED AVERAGE GLUCOSE RESULT: 5.3 % — SIGNIFICANT CHANGE UP (ref 4–5.6)
ESTIMATED AVERAGE GLUCOSE: 105 MG/DL — SIGNIFICANT CHANGE UP (ref 68–114)
SARS-COV-2 RNA SPEC QL NAA+PROBE: SIGNIFICANT CHANGE UP
T3 SERPL-MCNC: 94 NG/DL — SIGNIFICANT CHANGE UP (ref 80–200)
T4 AB SER-ACNC: 6.7 UG/DL — SIGNIFICANT CHANGE UP (ref 4.6–12)
TSH SERPL-MCNC: 1.78 UIU/ML — SIGNIFICANT CHANGE UP (ref 0.27–4.2)

## 2021-11-25 PROCEDURE — 99231 SBSQ HOSP IP/OBS SF/LOW 25: CPT

## 2021-11-25 RX ADMIN — SODIUM CHLORIDE 3 MILLILITER(S): 9 INJECTION INTRAMUSCULAR; INTRAVENOUS; SUBCUTANEOUS at 06:07

## 2021-11-25 RX ADMIN — Medication 0.1 MILLIGRAM(S): at 05:25

## 2021-11-25 RX ADMIN — SODIUM CHLORIDE 3 MILLILITER(S): 9 INJECTION INTRAMUSCULAR; INTRAVENOUS; SUBCUTANEOUS at 21:16

## 2021-11-25 RX ADMIN — ATENOLOL 50 MILLIGRAM(S): 25 TABLET ORAL at 05:25

## 2021-11-25 RX ADMIN — SODIUM CHLORIDE 3 MILLILITER(S): 9 INJECTION INTRAMUSCULAR; INTRAVENOUS; SUBCUTANEOUS at 13:03

## 2021-11-25 RX ADMIN — Medication 0.1 MILLIGRAM(S): at 18:09

## 2021-11-25 RX ADMIN — HEPARIN SODIUM 5000 UNIT(S): 5000 INJECTION INTRAVENOUS; SUBCUTANEOUS at 12:15

## 2021-11-25 RX ADMIN — HEPARIN SODIUM 5000 UNIT(S): 5000 INJECTION INTRAVENOUS; SUBCUTANEOUS at 05:23

## 2021-11-25 RX ADMIN — MONTELUKAST 10 MILLIGRAM(S): 4 TABLET, CHEWABLE ORAL at 11:34

## 2021-11-25 RX ADMIN — Medication 81 MILLIGRAM(S): at 11:34

## 2021-11-25 RX ADMIN — HEPARIN SODIUM 5000 UNIT(S): 5000 INJECTION INTRAVENOUS; SUBCUTANEOUS at 22:02

## 2021-11-25 RX ADMIN — AMLODIPINE BESYLATE 10 MILLIGRAM(S): 2.5 TABLET ORAL at 05:25

## 2021-11-25 NOTE — PROGRESS NOTE ADULT - PROBLEM SELECTOR PLAN 1
Continue asa 81 daily  Continue pre-op cardiac surgery workup  Dr. Casanova to review cardiac imaging from [facility]  Check TTE & PFTs  DVT prophylaxis on SQ Heparin Continue asa 81 daily  Continue pre-op cardiac surgery workup  Dr. Casanova to review cardiac imaging from [facility]    DVT prophylaxis on SQ Heparin

## 2021-11-25 NOTE — PHYSICAL THERAPY INITIAL EVALUATION ADULT - GENERAL OBSERVATIONS, REHAB EVAL
42 yo F presents sitting OOB in chair in NAD, +IVL, +tele. See VSS @ 12:20. AOx4, pleasant and cooperative

## 2021-11-25 NOTE — PROGRESS NOTE ADULT - SUBJECTIVE AND OBJECTIVE BOX
VITAL SIGNS    Telemetry:      Vital Signs Last 24 Hrs  T(C): 37 (11-25-21 @ 04:16), Max: 37 (11-25-21 @ 04:16)  T(F): 98.6 (11-25-21 @ 04:16), Max: 98.6 (11-25-21 @ 04:16)  HR: 80 (11-25-21 @ 04:16) (80 - 92)  BP: 136/88 (11-25-21 @ 04:16) (136/88 - 143/93)  RR: 18 (11-25-21 @ 04:16) (18 - 18)  SpO2: 96% (11-25-21 @ 04:16) (95% - 99%)                   Daily     Daily       Bilirubin Total, Serum: 0.4 mg/dL (11-24 @ 19:36)    CAPILLARY BLOOD GLUCOSE                    PHYSICAL EXAM    Neurology: alert and oriented x 3, moves all extremities with no defecits  CV :  RRR  Lungs:   CTA B/L  Abdomen: soft, nontender, nondistended, positive bowel sounds, last bowel movement   Extremities:                                            VITAL SIGNS    Telemetry:  sr  80    Vital Signs Last 24 Hrs  T(C): 37 (11-25-21 @ 04:16), Max: 37 (11-25-21 @ 04:16)  T(F): 98.6 (11-25-21 @ 04:16), Max: 98.6 (11-25-21 @ 04:16)  HR: 80 (11-25-21 @ 04:16) (80 - 92)  BP: 136/88 (11-25-21 @ 04:16) (136/88 - 143/93)  RR: 18 (11-25-21 @ 04:16) (18 - 18)  SpO2: 96% (11-25-21 @ 04:16) (95% - 99%)                   Daily     Daily       Bilirubin Total, Serum: 0.4 mg/dL (11-24 @ 19:36)    CAPILLARY BLOOD GLUCOSE                    PHYSICAL EXAM  s   No chest pain  No SOB"  Neurology: alert and oriented x 3,  lt side weakness  CV :  RRR  Lungs:   CTA B/L  Abdomen: soft, nontender, nondistended, positive bowel sounds,   Extremities:

## 2021-11-25 NOTE — PHYSICAL THERAPY INITIAL EVALUATION ADULT - ADDITIONAL COMMENTS
Pt lives in pvt home with her cousin with 2 steps to enter and lives on first floor. Pt has home HHA 5 days a week 6 hours and on weekends she is with family.  Pt use RW with Min/Mod A x2, has L AFO, pt demonstrates ataxia w/ambulation.

## 2021-11-25 NOTE — PHYSICAL THERAPY INITIAL EVALUATION ADULT - PERTINENT HX OF CURRENT PROBLEM, REHAB EVAL
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 ED for abnormal outpatient Cardiac CTA results showing coronary aneurysm. CTA: LMCA, originates from the left coronary sinus of Valsalva and thereafter appears to supply a large aneurysmal dilatation located anterior and superior to LV. Patient now transferred to [facility] for CT Surgery evaluation.

## 2021-11-25 NOTE — OCCUPATIONAL THERAPY INITIAL EVALUATION ADULT - DIAGNOSIS, OT EVAL
Pt p/w impaired balance, strength, endurance, coordination, ataxia, motor control, vision impacting fxnl ind.

## 2021-11-25 NOTE — OCCUPATIONAL THERAPY INITIAL EVALUATION ADULT - UPPER BODY DRESSING, PREVIOUS LEVEL OF FUNCTION, OT EVAL
1603: Pt arrived to ED STAB 1. Triage completed to the best of my ability. E=1, M=3-6, V=1  1604: IV inserted on first attempt. Blood work obtained and sent to lab.  1619: Pt medicated with etomidate for RSI.  1621: Pt medicated with rocuronium for RSI. Second PIV inserted on first attempt Second set of blood cultures obtained and sent to lab.  1622: 7.5ETT passed on first attempt under CMAC guidance. Measured at 22cm at the lip. Visualized passing of ETT through vocal cords. Bilateral breath sounds. Good colorimetric color change. Good EtCO2 waveform.  1623: OG inserted.  1627: XR obtained.  1637: Pt to CT with Sanjana Caro RN.  1650: Pt returned to ED Stab 1.  1710: Pt's silver coated rico removed. New silver coated rico inserted under sterile technique. Urine specimen obtained and sent to lab. She appears to have a large amount of vaginal yeast. Dr. Jauregui aware.  1725: Lab called needing additional urine. Additional specimen sent.  1730: RT at bedside for ABG.  1739: Pt's GCS = 3. Hypertensive despite sedation. Dr. Jauregui aware.  1810: Pt's BP dropped significantly post hydralazine administration. Sedation titrated. I spoke to the pt's  and updated him appropriately.  1815: Attempted to call report to ICU; however, no nursing staff until 1900 for this pt. They will call me for report when nursing staff arrives for pt. ED Charge RN, Fannie, and ED MD made aware.  1845: GCS = E1, M6, V1 now that BP has dropped and sedation as been decreased. Pt reporting pain and is tearful. Dr. Jauregui ordered Fentanyl drip. She appears to have a stage 2 pressure ulcer to her buttocks after being log rolled. Received PCA pump from [facility]. Pending Fentanyl to come from Pharmacy and PCA key to come from [facility].  1856: Lab at bedside to obtain fungal culture.  1913: Clive Romero RN, set-up Fentanyl infusion.  1915: Verbal report to Jessica ICU RN. She had no questions upon completion of report.  1924: Brief  report to [facility] Duffy RN. Pt to go to floor via cart, Clive, RT, and EDT.   needed assist

## 2021-11-25 NOTE — PROGRESS NOTE ADULT - ASSESSMENT
This is a wheelchair bound 40 y/o female with PMHx of HTN, CVAx2 (2015) s/p trach and PEG with residual right sided weakness who presented to [facility] ED for abnormal outpatient Cardiac CTA results showing coronary aneurysm. CTA: LMCA, originates from the left coronary sinus of Valsalva and thereafter appears to supply a large aneurysmal dilatation located anterior and superior to LV. Calcification and possible thrombus visualized within the wall of this dilated aneurysmal portion which appears to compress the left atrium. RCA: dominant, demonstrates dilatation in the proximal portions of the vessel but appears patent. Patient had recent admission for chest pain, MI ruled out. Patient now transferred to [facility] for CT Surgery evaluation for possible coronary aneurysm repair.  This is a wheelchair bound 40 y/o female with PMHx of HTN, CVAx2 (2015) s/p trach and PEG with residual right sided weakness who presented to [facility] ED for abnormal outpatient Cardiac CTA results showing coronary aneurysm. CTA: LMCA, originates from the left coronary sinus of Valsalva and thereafter appears to supply a large aneurysmal dilatation located anterior and superior to LV. Calcification and possible thrombus visualized within the wall of this dilated aneurysmal portion which appears to compress the left atrium. RCA: dominant, demonstrates dilatation in the proximal portions of the vessel but appears patent. Patient had recent admission for chest pain, MI ruled out. Patient now transferred to [facility] for CT Surgery evaluation for possible coronary aneurysm repair.   11/25    OOB to leila arias

## 2021-11-25 NOTE — PHYSICAL THERAPY INITIAL EVALUATION ADULT - ACTIVE RANGE OF MOTION EXAMINATION, REHAB EVAL
Pt use L AFO at home/bilateral upper extremity Active ROM was WFL (within functional limits)/bilateral  lower extremity Active ROM was WFL (within functional limits)

## 2021-11-25 NOTE — OCCUPATIONAL THERAPY INITIAL EVALUATION ADULT - PRECAUTIONS/LIMITATIONS, REHAB EVAL
Calcification and possible thrombus visualized within the wall of this dilated aneurysmal portion which appears to compress the left atrium. RCA: dominant, demonstrates dilatation in the proximal portions of the vessel but appears patent. Patient had recent admission for chest pain, MI ruled out. Patient now transferred to [facility] for CT Surgery evaluation for possible coronary aneurysm repair. Calcification and possible thrombus visualized within the wall of this dilated aneurysmal portion which appears to compress the left atrium. RCA: dominant, demonstrates dilatation in the proximal portions of the vessel but appears patent. Patient had recent admission for chest pain, MI ruled out. Patient now transferred to [facility] for CT Surgery evaluation for possible coronary aneurysm repair./fall precautions

## 2021-11-25 NOTE — OCCUPATIONAL THERAPY INITIAL EVALUATION ADULT - BALANCE DISTURBANCE, IDENTIFIED IMPAIRMENT CONTRIBUTE, REHAB EVAL
impaired coordination/impaired motor control/impaired postural control/decreased sensation/decreased strength

## 2021-11-25 NOTE — OCCUPATIONAL THERAPY INITIAL EVALUATION ADULT - PERTINENT HX OF CURRENT PROBLEM, REHAB EVAL
42 y/o female with PMHx of HTN, CVAx2 (2015) s/p trach and PEG with residual right sided weakness who presented to [facility] ED for abnormal outpatient Cardiac CTA results showing coronary aneurysm. CTA: LMCA, originates from the left coronary sinus of Valsalva and thereafter appears to supply a large aneurysmal dilatation located anterior and superior to LV. 41F with PMHx of HTN, CVAx2 (2015) s/p trach and PEG with residual right sided weakness who presented to [facility] ED for abnormal outpatient Cardiac CTA results showing coronary aneurysm. CTA: LMCA, originates from the left coronary sinus of Valsalva and thereafter appears to supply a large aneurysmal dilatation located anterior and superior to LV.

## 2021-11-25 NOTE — OCCUPATIONAL THERAPY INITIAL EVALUATION ADULT - LIVES WITH, PROFILE
Pt lives in [place] +cousin. Pt has 3 SRIKANTH and first floor setup. Pt reports requiring assistance with ADLs and IADLs. Pt wears L ankle AFO.

## 2021-11-25 NOTE — OCCUPATIONAL THERAPY INITIAL EVALUATION ADULT - IMPAIRED TRANSFERS: BED/CHAIR, REHAB EVAL
ataxia/impaired balance/impaired coordination/impaired motor control/impaired postural control/decreased strength

## 2021-11-25 NOTE — PHYSICAL THERAPY INITIAL EVALUATION ADULT - GAIT DEVIATIONS NOTED, PT EVAL
decreased priyanka/increased time in double stance/footdrop/decreased step length/decreased weight-shifting ability

## 2021-11-26 LAB
ANION GAP SERPL CALC-SCNC: 10 MMOL/L — SIGNIFICANT CHANGE UP (ref 5–17)
BUN SERPL-MCNC: 11 MG/DL — SIGNIFICANT CHANGE UP (ref 7–23)
CALCIUM SERPL-MCNC: 8.9 MG/DL — SIGNIFICANT CHANGE UP (ref 8.4–10.5)
CHLORIDE SERPL-SCNC: 103 MMOL/L — SIGNIFICANT CHANGE UP (ref 96–108)
CO2 SERPL-SCNC: 25 MMOL/L — SIGNIFICANT CHANGE UP (ref 22–31)
CREAT SERPL-MCNC: 0.87 MG/DL — SIGNIFICANT CHANGE UP (ref 0.5–1.3)
GLUCOSE SERPL-MCNC: 100 MG/DL — HIGH (ref 70–99)
HCT VFR BLD CALC: 33.2 % — LOW (ref 34.5–45)
HGB BLD-MCNC: 10.2 G/DL — LOW (ref 11.5–15.5)
MCHC RBC-ENTMCNC: 25.6 PG — LOW (ref 27–34)
MCHC RBC-ENTMCNC: 30.7 GM/DL — LOW (ref 32–36)
MCV RBC AUTO: 83.2 FL — SIGNIFICANT CHANGE UP (ref 80–100)
NRBC # BLD: 0 /100 WBCS — SIGNIFICANT CHANGE UP (ref 0–0)
PLATELET # BLD AUTO: 208 K/UL — SIGNIFICANT CHANGE UP (ref 150–400)
POTASSIUM SERPL-MCNC: 3.8 MMOL/L — SIGNIFICANT CHANGE UP (ref 3.5–5.3)
POTASSIUM SERPL-SCNC: 3.8 MMOL/L — SIGNIFICANT CHANGE UP (ref 3.5–5.3)
RBC # BLD: 3.99 M/UL — SIGNIFICANT CHANGE UP (ref 3.8–5.2)
RBC # FLD: 13.8 % — SIGNIFICANT CHANGE UP (ref 10.3–14.5)
SODIUM SERPL-SCNC: 138 MMOL/L — SIGNIFICANT CHANGE UP (ref 135–145)
WBC # BLD: 5.87 K/UL — SIGNIFICANT CHANGE UP (ref 3.8–10.5)
WBC # FLD AUTO: 5.87 K/UL — SIGNIFICANT CHANGE UP (ref 3.8–10.5)

## 2021-11-26 PROCEDURE — 93010 ELECTROCARDIOGRAM REPORT: CPT

## 2021-11-26 PROCEDURE — 94010 BREATHING CAPACITY TEST: CPT | Mod: 26

## 2021-11-26 PROCEDURE — 99231 SBSQ HOSP IP/OBS SF/LOW 25: CPT

## 2021-11-26 PROCEDURE — 93454 CORONARY ARTERY ANGIO S&I: CPT | Mod: 26

## 2021-11-26 PROCEDURE — 99152 MOD SED SAME PHYS/QHP 5/>YRS: CPT

## 2021-11-26 RX ADMIN — HEPARIN SODIUM 5000 UNIT(S): 5000 INJECTION INTRAVENOUS; SUBCUTANEOUS at 05:11

## 2021-11-26 RX ADMIN — SODIUM CHLORIDE 3 MILLILITER(S): 9 INJECTION INTRAMUSCULAR; INTRAVENOUS; SUBCUTANEOUS at 05:38

## 2021-11-26 RX ADMIN — SODIUM CHLORIDE 3 MILLILITER(S): 9 INJECTION INTRAMUSCULAR; INTRAVENOUS; SUBCUTANEOUS at 21:30

## 2021-11-26 RX ADMIN — Medication 81 MILLIGRAM(S): at 10:52

## 2021-11-26 RX ADMIN — Medication 0.1 MILLIGRAM(S): at 05:11

## 2021-11-26 RX ADMIN — SODIUM CHLORIDE 3 MILLILITER(S): 9 INJECTION INTRAMUSCULAR; INTRAVENOUS; SUBCUTANEOUS at 14:05

## 2021-11-26 RX ADMIN — HEPARIN SODIUM 5000 UNIT(S): 5000 INJECTION INTRAVENOUS; SUBCUTANEOUS at 21:31

## 2021-11-26 RX ADMIN — ATENOLOL 50 MILLIGRAM(S): 25 TABLET ORAL at 05:11

## 2021-11-26 RX ADMIN — AMLODIPINE BESYLATE 10 MILLIGRAM(S): 2.5 TABLET ORAL at 05:11

## 2021-11-26 RX ADMIN — Medication 0.1 MILLIGRAM(S): at 18:41

## 2021-11-26 RX ADMIN — MONTELUKAST 10 MILLIGRAM(S): 4 TABLET, CHEWABLE ORAL at 11:24

## 2021-11-26 NOTE — PROGRESS NOTE ADULT - SUBJECTIVE AND OBJECTIVE BOX
Subjective: "Im ok "  Patient denies chest pain/shorntness of breath.  No headache.  No calf pain or tenderness     TELEMETRY: NSR 70s-80s    VITAL SIGNS    Vital Signs Last 24 Hrs  T(C): 37 (21 @ 11:54), Max: 37 (21 @ 11:54)  T(F): 98.6 (21 @ 11:54), Max: 98.6 (21 @ 11:54)  HR: 68 (21 @ 11:54) (68 - 82)  BP: 144/92 (21 @ 11:54) (120/80 - 144/92)  RR: 18 (21 @ 11:54) (18 - 18)  SpO2: 97% (21 @ 11:54) (97% - 100%)             @ 07:01  -   @ 07:00  --------------------------------------------------------  IN: 720 mL / OUT: 1250 mL / NET: -530 mL     @ 07:01  -   @ 12:24  --------------------------------------------------------  IN: 120 mL / OUT: 0 mL / NET: 120 mL       Daily Height in cm: 167.6 (2021 11:54)    Daily Weight in k.5 (2021 10:25)  Admit Wt: Drug Dosing Weight  Height (cm): 167.6 (2021 11:54)  Weight (kg): 69.5 (2021 11:54)  BMI (kg/m2): 24.7 (2021 11:54)  BSA (m2): 1.79 (2021 11:54)         PHYSICAL EXAM    Neurology: alert and oriented x 3, nonfocal, no gross deficits  CV : RRR +S1/S2  Lungs: CTA BL  RR easy, unlabored   Abdomen: soft, nontender, nondistended, positive bowel sounds  :  pt voiding without difficulty   Extremities:   DARDEN; +1 LE edema, neg calf tenderness.   PPP bilaterally        amLODIPine   Tablet 10 milliGRAM(s) Oral daily  aspirin enteric coated 81 milliGRAM(s) Oral daily  ATENolol  Tablet 50 milliGRAM(s) Oral daily  cloNIDine 0.1 milliGRAM(s) Oral two times a day  heparin   Injectable 5000 Unit(s) SubCutaneous every 8 hours  influenza   Vaccine 0.5 milliLiter(s) IntraMuscular once  montelukast 10 milliGRAM(s) Oral daily  sodium chloride 0.9% lock flush 3 milliLiter(s) IV Push every 8 hours

## 2021-11-26 NOTE — PROGRESS NOTE ADULT - PROBLEM SELECTOR PLAN 1
Continue asa 81 daily  Continue pre-op cardiac surgery workup  Cardiac cath today 11/26    DVT prophylaxis on SQ Heparin

## 2021-11-26 NOTE — PROGRESS NOTE ADULT - ASSESSMENT
This is a wheelchair bound 42 y/o female with PMHx of HTN, CVAx2 (2015) s/p trach and PEG with residual right sided weakness who presented to [facility] ED for abnormal outpatient Cardiac CTA results showing coronary aneurysm. CTA: LMCA, originates from the left coronary sinus of Valsalva and thereafter appears to supply a large aneurysmal dilatation located anterior and superior to LV. Calcification and possible thrombus visualized within the wall of this dilated aneurysmal portion which appears to compress the left atrium. RCA: dominant, demonstrates dilatation in the proximal portions of the vessel but appears patent. Patient had recent admission for chest pain, MI ruled out. Patient now transferred to [facility] for CT Surgery evaluation for possible coronary aneurysm repair.   11/25    OOB to chair,   vss    11/26 VSS overnight.  Will obtain cardiac cath today to evaluate coronaries. Chest pain/sob free overnight

## 2021-11-27 LAB
ANION GAP SERPL CALC-SCNC: 10 MMOL/L — SIGNIFICANT CHANGE UP (ref 5–17)
BUN SERPL-MCNC: 14 MG/DL — SIGNIFICANT CHANGE UP (ref 7–23)
CALCIUM SERPL-MCNC: 9.2 MG/DL — SIGNIFICANT CHANGE UP (ref 8.4–10.5)
CHLORIDE SERPL-SCNC: 104 MMOL/L — SIGNIFICANT CHANGE UP (ref 96–108)
CK MB CFR SERPL CALC: 1 NG/ML — SIGNIFICANT CHANGE UP (ref 0–3.8)
CK SERPL-CCNC: 66 U/L — SIGNIFICANT CHANGE UP (ref 25–170)
CO2 SERPL-SCNC: 25 MMOL/L — SIGNIFICANT CHANGE UP (ref 22–31)
CREAT SERPL-MCNC: 0.89 MG/DL — SIGNIFICANT CHANGE UP (ref 0.5–1.3)
GLUCOSE SERPL-MCNC: 101 MG/DL — HIGH (ref 70–99)
HCT VFR BLD CALC: 35 % — SIGNIFICANT CHANGE UP (ref 34.5–45)
HGB BLD-MCNC: 10.4 G/DL — LOW (ref 11.5–15.5)
MCHC RBC-ENTMCNC: 25.9 PG — LOW (ref 27–34)
MCHC RBC-ENTMCNC: 29.7 GM/DL — LOW (ref 32–36)
MCV RBC AUTO: 87.1 FL — SIGNIFICANT CHANGE UP (ref 80–100)
NRBC # BLD: 0 /100 WBCS — SIGNIFICANT CHANGE UP (ref 0–0)
PLATELET # BLD AUTO: 223 K/UL — SIGNIFICANT CHANGE UP (ref 150–400)
POTASSIUM SERPL-MCNC: 4.2 MMOL/L — SIGNIFICANT CHANGE UP (ref 3.5–5.3)
POTASSIUM SERPL-SCNC: 4.2 MMOL/L — SIGNIFICANT CHANGE UP (ref 3.5–5.3)
RBC # BLD: 4.02 M/UL — SIGNIFICANT CHANGE UP (ref 3.8–5.2)
RBC # FLD: 13.9 % — SIGNIFICANT CHANGE UP (ref 10.3–14.5)
SODIUM SERPL-SCNC: 139 MMOL/L — SIGNIFICANT CHANGE UP (ref 135–145)
TROPONIN T, HIGH SENSITIVITY RESULT: 9 NG/L — SIGNIFICANT CHANGE UP (ref 0–51)
WBC # BLD: 5.81 K/UL — SIGNIFICANT CHANGE UP (ref 3.8–10.5)
WBC # FLD AUTO: 5.81 K/UL — SIGNIFICANT CHANGE UP (ref 3.8–10.5)

## 2021-11-27 PROCEDURE — 93010 ELECTROCARDIOGRAM REPORT: CPT

## 2021-11-27 PROCEDURE — 99231 SBSQ HOSP IP/OBS SF/LOW 25: CPT

## 2021-11-27 RX ADMIN — MONTELUKAST 10 MILLIGRAM(S): 4 TABLET, CHEWABLE ORAL at 11:38

## 2021-11-27 RX ADMIN — HEPARIN SODIUM 5000 UNIT(S): 5000 INJECTION INTRAVENOUS; SUBCUTANEOUS at 06:24

## 2021-11-27 RX ADMIN — HEPARIN SODIUM 5000 UNIT(S): 5000 INJECTION INTRAVENOUS; SUBCUTANEOUS at 22:34

## 2021-11-27 RX ADMIN — SODIUM CHLORIDE 3 MILLILITER(S): 9 INJECTION INTRAMUSCULAR; INTRAVENOUS; SUBCUTANEOUS at 21:15

## 2021-11-27 RX ADMIN — Medication 0.1 MILLIGRAM(S): at 06:23

## 2021-11-27 RX ADMIN — SODIUM CHLORIDE 3 MILLILITER(S): 9 INJECTION INTRAMUSCULAR; INTRAVENOUS; SUBCUTANEOUS at 13:16

## 2021-11-27 RX ADMIN — Medication 81 MILLIGRAM(S): at 11:38

## 2021-11-27 RX ADMIN — Medication 0.1 MILLIGRAM(S): at 17:17

## 2021-11-27 RX ADMIN — ATENOLOL 50 MILLIGRAM(S): 25 TABLET ORAL at 06:23

## 2021-11-27 RX ADMIN — AMLODIPINE BESYLATE 10 MILLIGRAM(S): 2.5 TABLET ORAL at 06:23

## 2021-11-27 RX ADMIN — SODIUM CHLORIDE 3 MILLILITER(S): 9 INJECTION INTRAMUSCULAR; INTRAVENOUS; SUBCUTANEOUS at 05:52

## 2021-11-27 RX ADMIN — HEPARIN SODIUM 5000 UNIT(S): 5000 INJECTION INTRAVENOUS; SUBCUTANEOUS at 14:18

## 2021-11-27 NOTE — PROGRESS NOTE ADULT - PROBLEM SELECTOR PLAN 1
Continue asa 81 daily  Heart Failure to see for possible heart transplant eval  DVT prophylaxis on SQ Heparin

## 2021-11-27 NOTE — PROGRESS NOTE ADULT - SUBJECTIVE AND OBJECTIVE BOX
Subjective: "Im ok "  Patient denies chest pain/shortness of breath.  No pain at right radial cath site.      TELEMETRY:  NSR     VITAL SIGNS    Vital Signs Last 24 Hrs  T(C): 36.7 (11-27-21 @ 05:19), Max: 36.9 (11-26-21 @ 19:16)  T(F): 98 (11-27-21 @ 05:19), Max: 98.4 (11-26-21 @ 19:16)  HR: 74 (11-27-21 @ 05:19) (61 - 76)  BP: 132/90 (11-27-21 @ 05:19) (132/90 - 159/91)  RR: 18 (11-27-21 @ 05:19) (14 - 18)  SpO2: 99% (11-27-21 @ 05:19) (96% - 100%)            11-26 @ 07:01  -  11-27 @ 07:00  --------------------------------------------------------  IN: 120 mL / OUT: 600 mL / NET: -480 mL    11-27 @ 07:01  -  11-27 @ 11:57  --------------------------------------------------------  IN: 240 mL / OUT: 0 mL / NET: 240 mL       Daily     Daily   Admit Wt: Drug Dosing Weight  Height (cm): 167.6 (26 Nov 2021 11:54)  Weight (kg): 69.5 (26 Nov 2021 11:54)  BMI (kg/m2): 24.7 (26 Nov 2021 11:54)  BSA (m2): 1.79 (26 Nov 2021 11:54)      CAPILLARY BLOOD GLUCOSE          PHYSICAL EXAM    Neurology: alert and oriented x 3, nonfocal, no gross deficits  CV : RRR +S1/S2  Lungs: CTA BL.  RR easy, unlabored   Abdomen: soft, nontender, nondistended, positive bowel sounds  :  pt voiding without difficulty   Extremities: Right radial cath site c/d/i No s/s hematoma or bleeding.  DARDEN; +1 LE  edema, neg calf tenderness.   PPP bilaterally          amLODIPine   Tablet 10 milliGRAM(s) Oral daily  aspirin enteric coated 81 milliGRAM(s) Oral daily  ATENolol  Tablet 50 milliGRAM(s) Oral daily  cloNIDine 0.1 milliGRAM(s) Oral two times a day  heparin   Injectable 5000 Unit(s) SubCutaneous every 8 hours  influenza   Vaccine 0.5 milliLiter(s) IntraMuscular once  montelukast 10 milliGRAM(s) Oral daily  sodium chloride 0.9% lock flush 3 milliLiter(s) IV Push every 8 hours

## 2021-11-27 NOTE — PROVIDER CONTACT NOTE (OTHER) - BACKGROUND
right radial cath, found to have multiple coronary aneurysms. Not likely a surgical candidate.  Heart Failure/Transplant team to evaluate for possible transplant work up.

## 2021-11-27 NOTE — PROGRESS NOTE ADULT - ASSESSMENT
This is a wheelchair bound 40 y/o female with PMHx of HTN, CVAx2 (2015) s/p trach and PEG with residual right sided weakness who presented to [facility] ED for abnormal outpatient Cardiac CTA results showing coronary aneurysm. CTA: LMCA, originates from the left coronary sinus of Valsalva and thereafter appears to supply a large aneurysmal dilatation located anterior and superior to LV. Calcification and possible thrombus visualized within the wall of this dilated aneurysmal portion which appears to compress the left atrium. RCA: dominant, demonstrates dilatation in the proximal portions of the vessel but appears patent. Patient had recent admission for chest pain, MI ruled out. Patient now transferred to [facility] for CT Surgery evaluation for possible coronary aneurysm repair.   11/25    OOB to chair,   vss    11/26 VSS overnight.  Will obtain cardiac cath today to evaluate coronaries. Chest pain/sob free overnight    11/27 VSS.  No bleeding at right radial cath site.  Found to have multiple coronary aneurysms.  Not likely a surgical candidate.  Heart Failure/Transplant team to evaluate for possible transplant work up.

## 2021-11-28 LAB
ANION GAP SERPL CALC-SCNC: 12 MMOL/L — SIGNIFICANT CHANGE UP (ref 5–17)
BUN SERPL-MCNC: 14 MG/DL — SIGNIFICANT CHANGE UP (ref 7–23)
CALCIUM SERPL-MCNC: 9.3 MG/DL — SIGNIFICANT CHANGE UP (ref 8.4–10.5)
CHLORIDE SERPL-SCNC: 100 MMOL/L — SIGNIFICANT CHANGE UP (ref 96–108)
CO2 SERPL-SCNC: 25 MMOL/L — SIGNIFICANT CHANGE UP (ref 22–31)
CREAT SERPL-MCNC: 0.9 MG/DL — SIGNIFICANT CHANGE UP (ref 0.5–1.3)
GLUCOSE SERPL-MCNC: 91 MG/DL — SIGNIFICANT CHANGE UP (ref 70–99)
HCT VFR BLD CALC: 33.8 % — LOW (ref 34.5–45)
HGB BLD-MCNC: 10.2 G/DL — LOW (ref 11.5–15.5)
MCHC RBC-ENTMCNC: 25.4 PG — LOW (ref 27–34)
MCHC RBC-ENTMCNC: 30.2 GM/DL — LOW (ref 32–36)
MCV RBC AUTO: 84.3 FL — SIGNIFICANT CHANGE UP (ref 80–100)
NRBC # BLD: 0 /100 WBCS — SIGNIFICANT CHANGE UP (ref 0–0)
PLATELET # BLD AUTO: 233 K/UL — SIGNIFICANT CHANGE UP (ref 150–400)
POTASSIUM SERPL-MCNC: 4.2 MMOL/L — SIGNIFICANT CHANGE UP (ref 3.5–5.3)
POTASSIUM SERPL-SCNC: 4.2 MMOL/L — SIGNIFICANT CHANGE UP (ref 3.5–5.3)
RBC # BLD: 4.01 M/UL — SIGNIFICANT CHANGE UP (ref 3.8–5.2)
RBC # FLD: 14 % — SIGNIFICANT CHANGE UP (ref 10.3–14.5)
SODIUM SERPL-SCNC: 137 MMOL/L — SIGNIFICANT CHANGE UP (ref 135–145)
WBC # BLD: 6.24 K/UL — SIGNIFICANT CHANGE UP (ref 3.8–10.5)
WBC # FLD AUTO: 6.24 K/UL — SIGNIFICANT CHANGE UP (ref 3.8–10.5)

## 2021-11-28 PROCEDURE — 99223 1ST HOSP IP/OBS HIGH 75: CPT

## 2021-11-28 PROCEDURE — 99232 SBSQ HOSP IP/OBS MODERATE 35: CPT

## 2021-11-28 RX ORDER — ATENOLOL 25 MG/1
25 TABLET ORAL ONCE
Refills: 0 | Status: COMPLETED | OUTPATIENT
Start: 2021-11-28 | End: 2021-11-28

## 2021-11-28 RX ORDER — ATENOLOL 25 MG/1
75 TABLET ORAL DAILY
Refills: 0 | Status: DISCONTINUED | OUTPATIENT
Start: 2021-11-29 | End: 2021-12-03

## 2021-11-28 RX ADMIN — SODIUM CHLORIDE 3 MILLILITER(S): 9 INJECTION INTRAMUSCULAR; INTRAVENOUS; SUBCUTANEOUS at 21:27

## 2021-11-28 RX ADMIN — Medication 0.1 MILLIGRAM(S): at 17:31

## 2021-11-28 RX ADMIN — Medication 0.1 MILLIGRAM(S): at 06:22

## 2021-11-28 RX ADMIN — SODIUM CHLORIDE 3 MILLILITER(S): 9 INJECTION INTRAMUSCULAR; INTRAVENOUS; SUBCUTANEOUS at 14:58

## 2021-11-28 RX ADMIN — HEPARIN SODIUM 5000 UNIT(S): 5000 INJECTION INTRAVENOUS; SUBCUTANEOUS at 06:22

## 2021-11-28 RX ADMIN — HEPARIN SODIUM 5000 UNIT(S): 5000 INJECTION INTRAVENOUS; SUBCUTANEOUS at 14:23

## 2021-11-28 RX ADMIN — Medication 81 MILLIGRAM(S): at 14:23

## 2021-11-28 RX ADMIN — HEPARIN SODIUM 5000 UNIT(S): 5000 INJECTION INTRAVENOUS; SUBCUTANEOUS at 22:22

## 2021-11-28 RX ADMIN — SODIUM CHLORIDE 3 MILLILITER(S): 9 INJECTION INTRAMUSCULAR; INTRAVENOUS; SUBCUTANEOUS at 07:18

## 2021-11-28 RX ADMIN — ATENOLOL 25 MILLIGRAM(S): 25 TABLET ORAL at 14:23

## 2021-11-28 RX ADMIN — AMLODIPINE BESYLATE 10 MILLIGRAM(S): 2.5 TABLET ORAL at 06:22

## 2021-11-28 RX ADMIN — ATENOLOL 50 MILLIGRAM(S): 25 TABLET ORAL at 06:22

## 2021-11-28 RX ADMIN — MONTELUKAST 10 MILLIGRAM(S): 4 TABLET, CHEWABLE ORAL at 14:23

## 2021-11-28 NOTE — CONSULT NOTE ADULT - PROBLEM SELECTOR RECOMMENDATION 9
c/w clonidine 0.1 mg twice/day  c/w norvasc 10 mg daily  increase atenolol to 75 mg daily (goal HR 60-70)  goal SBP <120/80

## 2021-11-28 NOTE — PROGRESS NOTE ADULT - ASSESSMENT
This is a wheelchair bound 42 y/o female with PMHx of HTN, CVAx2 (2015) s/p trach and PEG with residual right sided weakness who presented to [facility] ED for abnormal outpatient Cardiac CTA results showing coronary aneurysm. CTA: LMCA, originates from the left coronary sinus of Valsalva and thereafter appears to supply a large aneurysmal dilatation located anterior and superior to LV. Calcification and possible thrombus visualized within the wall of this dilated aneurysmal portion which appears to compress the left atrium. RCA: dominant, demonstrates dilatation in the proximal portions of the vessel but appears patent. Patient had recent admission for chest pain, MI ruled out. Patient now transferred to [facility] for CT Surgery evaluation for possible coronary aneurysm repair.   11/25    OOB to chair,   vss    11/26 VSS overnight.  Will obtain cardiac cath today to evaluate coronaries. Chest pain/sob free overnight    11/27 VSS.  No bleeding at right radial cath site.  Found to have multiple coronary aneurysms.  Not likely a surgical candidate.  Heart Failure/Transplant team to evaluate for possible transplant work up.   11/28 VSS atenolol increased  to 75mg per  heart failure not a Transplant candidate.

## 2021-11-28 NOTE — PROGRESS NOTE ADULT - PROBLEM SELECTOR PLAN 1
Continue asa 81 daily  Heart Failure to see for possible heart transplant eval- not a candidate  DVT prophylaxis on SQ Heparin  medial management

## 2021-11-28 NOTE — CONSULT NOTE ADULT - ASSESSMENT
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 Found to have large saccular aneurysm of LM with LCX and LAD originating from this with medium aneursym of RCA in setting of normal biventricular function. Appears compensated and euvolemic but hypertensive. Unfortunately does not appear to be a transplant candidate for this indication given her overall debility but will d/w colleagues

## 2021-11-28 NOTE — CONSULT NOTE ADULT - SUBJECTIVE AND OBJECTIVE BOX
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 Per patient and her cousin (Marko), she has had uncontrolled blood pressure for a number of years. Was diagnosed with RAJI and uses a mouthguard. Had reportedly been having some chest tightness over past 10 years with no clear workup. In  had left sided weakness with coordination issues and was found to have a brainstem bleed. Had required a trach (now decannulated) and PEG (now removed) during that period of time. Since, she has required assistive devices to ambulate along with 1 person assist. Able to perform IADLs. She had worsening chest tightness this past month and went to the hospital  with a negative lab workup with plan for outpatient cardiac CT which she underwent  which was abnormal and was told to come to the ER. Cardiac CT showed large aneurysmal dilatation located anterior and superior to LV with calcification/thrombus within wall and compressing LA as well as proximal dilatation of RCA. TTE showed normal biventricular function. [facility]  showed large saccular aneurysm of proximal LM with LAD and LCx coming off confluence and medium localized aneurysm of RCA. Per CT surgery, determined to not be a CABG candidate and Dr. Casanova requested possibility of transplant.     Currently denies CP/SOB while at rest. Denies orthopnea/PND. Reports previously would get chest tightness with sensation of "feeling her heart." Lives with her cousin (Marko) as well as others who support her. Unclear if she had Kawasaki's as a child but reports was told that it was treated with ASA.     PAST MEDICAL & SURGICAL HISTORY:  HTN (hypertension)    CVA (cerebrovascular accident)    History of tracheostomy    FAMILY HISTORY:      Home Medications:  amLODIPine 10 mg oral tablet: 1 tab(s) orally once a day (2021 18:48)  aspirin 81 mg oral delayed release tablet: 1 tab(s) orally once a day (2021 18:48)  atenolol 50 mg oral tablet: 1 tab(s) orally once a day (2021 18:48)  cloNIDine 0.1 mg oral tablet: 1 tab(s) orally 2 times a day (2021 18:48)  montelukast 10 mg oral tablet: 1 tab(s) orally once a day (2021 18:48)      Medications:  amLODIPine   Tablet 10 milliGRAM(s) Oral daily  aspirin enteric coated 81 milliGRAM(s) Oral daily  ATENolol  Tablet 25 milliGRAM(s) Oral once  cloNIDine 0.1 milliGRAM(s) Oral two times a day  heparin   Injectable 5000 Unit(s) SubCutaneous every 8 hours  influenza   Vaccine 0.5 milliLiter(s) IntraMuscular once  montelukast 10 milliGRAM(s) Oral daily  sodium chloride 0.9% lock flush 3 milliLiter(s) IV Push every 8 hours      Review of systems:  10 point review of systems completed and are negative unless noted in HPI    Vitals:  T(C): 36.7 (21 @ 04:50), Max: 36.9 (21 @ 19:11)  HR: 75 (21 @ 04:50) (68 - 87)  BP: 147/83 (21 @ 04:50) (124/84 - 147/83)  BP(mean): 97 (21 @ 19:11) (97 - 97)  RR: 18 (21 @ 04:50) (18 - 18)  SpO2: 100% (21 @ 04:50) (97% - 100%)    Daily     Daily Weight in k.8 (2021 18:23)        I&O's Summary    2021 07:01  -  2021 07:00  --------------------------------------------------------  IN: 760 mL / OUT: 510 mL / NET: 250 mL    Physical Exam:  Appearance: No Acute Distress  HEENT: PERRL; nystagmus  Neck: no JVP  Cardiovascular: Normal S1 S2, No murmurs/rubs/gallops  Respiratory: Clear to auscultation bilaterally  Gastrointestinal: Soft, Non-tender	  Skin: No cyanosis	  Neurologic: strength intact b/l; dysmetria present on L   Extremities: No LE edema  Psychiatry: A & O x 3, Mood & affect appropriate    Labs:                        10.2   6.24  )-----------( 233      ( 2021 07:15 )             33.8         137  |  100  |  14  ----------------------------<  91  4.2   |  25  |  0.90    Ca    9.3      2021 07:15        CARDIAC MARKERS ( 2021 19:30 )  x     / x     / 66 U/L / x     / 1.0 ng/mL      EK21 - NSR, nl axis, good RWP

## 2021-11-28 NOTE — CONSULT NOTE ADULT - PROBLEM SELECTOR RECOMMENDATION 2
large saccular aneurysm of LM   no current angina with negative troponin or ECG changes  would consider anticoagulation   consider vascular medicine consult (Dr. Steven)  c/w ASA 8 1mg daily  unfortunately does not appear to be a transplant candidate for this indication given her overall debility but will d/w colleagues

## 2021-11-29 LAB
ANION GAP SERPL CALC-SCNC: 12 MMOL/L — SIGNIFICANT CHANGE UP (ref 5–17)
BUN SERPL-MCNC: 14 MG/DL — SIGNIFICANT CHANGE UP (ref 7–23)
CALCIUM SERPL-MCNC: 9.3 MG/DL — SIGNIFICANT CHANGE UP (ref 8.4–10.5)
CHLORIDE SERPL-SCNC: 100 MMOL/L — SIGNIFICANT CHANGE UP (ref 96–108)
CO2 SERPL-SCNC: 25 MMOL/L — SIGNIFICANT CHANGE UP (ref 22–31)
CREAT SERPL-MCNC: 0.98 MG/DL — SIGNIFICANT CHANGE UP (ref 0.5–1.3)
GLUCOSE SERPL-MCNC: 93 MG/DL — SIGNIFICANT CHANGE UP (ref 70–99)
HCT VFR BLD CALC: 35.3 % — SIGNIFICANT CHANGE UP (ref 34.5–45)
HGB BLD-MCNC: 10.8 G/DL — LOW (ref 11.5–15.5)
MCHC RBC-ENTMCNC: 26.1 PG — LOW (ref 27–34)
MCHC RBC-ENTMCNC: 30.6 GM/DL — LOW (ref 32–36)
MCV RBC AUTO: 85.3 FL — SIGNIFICANT CHANGE UP (ref 80–100)
MRSA PCR RESULT.: SIGNIFICANT CHANGE UP
NRBC # BLD: 0 /100 WBCS — SIGNIFICANT CHANGE UP (ref 0–0)
PLATELET # BLD AUTO: 238 K/UL — SIGNIFICANT CHANGE UP (ref 150–400)
POTASSIUM SERPL-MCNC: 4.5 MMOL/L — SIGNIFICANT CHANGE UP (ref 3.5–5.3)
POTASSIUM SERPL-MCNC: 5.4 MMOL/L — HIGH (ref 3.5–5.3)
POTASSIUM SERPL-SCNC: 4.5 MMOL/L — SIGNIFICANT CHANGE UP (ref 3.5–5.3)
POTASSIUM SERPL-SCNC: 5.4 MMOL/L — HIGH (ref 3.5–5.3)
RBC # BLD: 4.14 M/UL — SIGNIFICANT CHANGE UP (ref 3.8–5.2)
RBC # FLD: 13.9 % — SIGNIFICANT CHANGE UP (ref 10.3–14.5)
S AUREUS DNA NOSE QL NAA+PROBE: SIGNIFICANT CHANGE UP
SODIUM SERPL-SCNC: 137 MMOL/L — SIGNIFICANT CHANGE UP (ref 135–145)
WBC # BLD: 6.49 K/UL — SIGNIFICANT CHANGE UP (ref 3.8–10.5)
WBC # FLD AUTO: 6.49 K/UL — SIGNIFICANT CHANGE UP (ref 3.8–10.5)

## 2021-11-29 PROCEDURE — 99232 SBSQ HOSP IP/OBS MODERATE 35: CPT

## 2021-11-29 RX ORDER — APIXABAN 2.5 MG/1
5 TABLET, FILM COATED ORAL
Refills: 0 | Status: DISCONTINUED | OUTPATIENT
Start: 2021-11-29 | End: 2021-11-29

## 2021-11-29 RX ORDER — ATORVASTATIN CALCIUM 80 MG/1
20 TABLET, FILM COATED ORAL AT BEDTIME
Refills: 0 | Status: DISCONTINUED | OUTPATIENT
Start: 2021-11-29 | End: 2021-12-03

## 2021-11-29 RX ORDER — APIXABAN 2.5 MG/1
2.5 TABLET, FILM COATED ORAL
Refills: 0 | Status: DISCONTINUED | OUTPATIENT
Start: 2021-11-29 | End: 2021-11-30

## 2021-11-29 RX ADMIN — MONTELUKAST 10 MILLIGRAM(S): 4 TABLET, CHEWABLE ORAL at 12:13

## 2021-11-29 RX ADMIN — ATENOLOL 75 MILLIGRAM(S): 25 TABLET ORAL at 06:25

## 2021-11-29 RX ADMIN — Medication 81 MILLIGRAM(S): at 12:13

## 2021-11-29 RX ADMIN — SODIUM CHLORIDE 3 MILLILITER(S): 9 INJECTION INTRAMUSCULAR; INTRAVENOUS; SUBCUTANEOUS at 06:43

## 2021-11-29 RX ADMIN — ATORVASTATIN CALCIUM 20 MILLIGRAM(S): 80 TABLET, FILM COATED ORAL at 21:25

## 2021-11-29 RX ADMIN — Medication 0.1 MILLIGRAM(S): at 17:22

## 2021-11-29 RX ADMIN — HEPARIN SODIUM 5000 UNIT(S): 5000 INJECTION INTRAVENOUS; SUBCUTANEOUS at 06:43

## 2021-11-29 RX ADMIN — AMLODIPINE BESYLATE 10 MILLIGRAM(S): 2.5 TABLET ORAL at 06:24

## 2021-11-29 RX ADMIN — HEPARIN SODIUM 5000 UNIT(S): 5000 INJECTION INTRAVENOUS; SUBCUTANEOUS at 14:13

## 2021-11-29 RX ADMIN — APIXABAN 2.5 MILLIGRAM(S): 2.5 TABLET, FILM COATED ORAL at 18:00

## 2021-11-29 RX ADMIN — SODIUM CHLORIDE 3 MILLILITER(S): 9 INJECTION INTRAMUSCULAR; INTRAVENOUS; SUBCUTANEOUS at 14:08

## 2021-11-29 RX ADMIN — SODIUM CHLORIDE 3 MILLILITER(S): 9 INJECTION INTRAMUSCULAR; INTRAVENOUS; SUBCUTANEOUS at 20:55

## 2021-11-29 RX ADMIN — Medication 0.1 MILLIGRAM(S): at 06:24

## 2021-11-29 NOTE — PROGRESS NOTE ADULT - SUBJECTIVE AND OBJECTIVE BOX
VITAL SIGNS    Telemetry:      Vital Signs Last 24 Hrs  T(C): 36.7 (21 @ 04:40), Max: 36.9 (21 @ 13:43)  T(F): 98.1 (21 @ 04:40), Max: 98.4 (21 @ 13:43)  HR: 77 (21 @ 04:40) (71 - 84)  BP: 133/86 (21 @ 04:40) (114/60 - 146/95)  RR: 18 (21 @ 04:40) (18 - 18)  SpO2: 100% (21 @ 04:40) (96% - 100%)                   Daily     Daily Weight in k.4 (2021 11:49)        CAPILLARY BLOOD GLUCOSE                                  PHYSICAL EXAM  s   No chest  pain   No sob"  Neurology: alert and oriented x 3lt arm eakness   LE  BL weankess  CV :  RRR  Lungs:   CTA B/L  Abdomen: soft, nontender, nondistended, positive bowel sounds,  Extremities:     no edema  no calf tenderness

## 2021-11-29 NOTE — PROGRESS NOTE ADULT - PROBLEM SELECTOR PLAN 1
Continue asa 81 daily  DVT prophylaxis on SQ Heparin  medial management   Dr Camara to see patient    tuesday and take over care  eliquis 5 q12

## 2021-11-29 NOTE — CONSULT NOTE ADULT - SUBJECTIVE AND OBJECTIVE BOX
CHIEF COMPLAINT:Patient is a 41y old  Female who presents with a chief complaint of coronary aneurysm (2021 16:38)      HISTORY OF PRESENT ILLNESS:    41 female with history as below, HTN, CVA ? Kawasaki disease with large coronary aneurysm   pt denies any cp or sob now  no dizziness  no syncope     PAST MEDICAL & SURGICAL HISTORY:  HTN (hypertension)    CVA (cerebrovascular accident)    History of tracheostomy            MEDICATIONS:  amLODIPine   Tablet 10 milliGRAM(s) Oral daily  apixaban 2.5 milliGRAM(s) Oral two times a day  aspirin enteric coated 81 milliGRAM(s) Oral daily  ATENolol  Tablet 75 milliGRAM(s) Oral daily  cloNIDine 0.1 milliGRAM(s) Oral two times a day      montelukast 10 milliGRAM(s) Oral daily          influenza   Vaccine 0.5 milliLiter(s) IntraMuscular once  sodium chloride 0.9% lock flush 3 milliLiter(s) IV Push every 8 hours      FAMILY HISTORY:      Non-contributory    SOCIAL HISTORY:    No tobacco, drugs or etoh    Allergies    No Known Allergies    Intolerances    	    REVIEW OF SYSTEMS:  as above  The rest of the 14 points ROS reviewed and except above they are unremarkable.        PHYSICAL EXAM:  T(C): 36.6 (21 @ 13:01), Max: 36.7 (21 @ 19:23)  HR: 71 (21 @ 17:22) (71 - 89)  BP: 128/74 (21 @ 17:22) (124/74 - 133/86)  RR: 18 (21 @ 17:22) (18 - 18)  SpO2: 98% (21 @ 17:22) (98% - 100%)  Wt(kg): --  I&O's Summary    2021 07:  -  2021 07:00  --------------------------------------------------------  IN: 740 mL / OUT: 0 mL / NET: 740 mL    2021 07:  -  2021 18:26  --------------------------------------------------------  IN: 660 mL / OUT: 300 mL / NET: 360 mL        JVP: Normal  Neck: supple  Lung: clear   CV: S1 S2 , Murmur:  Abd: soft  Ext: No edema  neuro: Awake / alert  Psych: flat affect  Skin: normal     LABS/DATA:    TELEMETRY: 	    ECG:  	   	  CARDIAC MARKERS:    < from: TTE with Doppler (w/Cont) (21 @ 18:57) >  Conclusions:  1. Normal mitral valve. Mild mitral regurgitation.  2. Aortic valve leaflet morphology not well visualized.  Minimal aortic regurgitation.  3. Normal left ventricular systolic function. No segmental  wall motion abnormalities.  Endocardial visualization  enhanced with intravenous injection of Ultrasonic Enhancing  Agent (Definity).  4. Normal right ventricular size and function.  ------------------------------------------------------------------------  Confirmed on  2021 - 10:07:21 by Garry Curtis M.D.    < end of copied text >  < from: Cardiac Catheterization (21 @ 15:56) >  Name:           LIZZIE ESPINOZA   :            1980   (41 years)   Gender:         female   MR#:            [id number]   MPI#:           [id number]   Patient Class:  Inpatient     Cath Lab Report    Diagnostic Cardiologist:       Vince Kim MD   Fellow:                        French Stone MD   Monitor:                       Robles Cook MD   Scrub:                         Valentino Mccarthy CVT   Nurse:                         Ryan Rust RN   Referring Physician:           Robby Casanova MD   Referring Physician:           Reid Mcneill MD     Procedures Performed   Procedures:               1.    Art Access - R radial artery     2.    Left Coronary Angio   3.    Right Coronary Angio   4.    Sonosite - Diagnostic     Indications:               Myocardial infarction without ST elevation  (NSTEMI)    Diagnostic Conclusions:   Large left main saccular anuerysm with proximal LAD and Cx fusiform  anuerysmal segements.  Reccomend ongoing medical management with antiplatelet agents, blood  pressure control. CTS to continue consideration for  surgical management of large aneurysmal disease, if amenable. Findings  relayed to patient, patient's cardiologist and CTS.    Presentation:   wheelchair bound 40 y/o female with PMHx of HTN, CVAx2 (2015) s/p  trach and PEG with residual right  sided weakness who presented to [facility] ED for abnormal outpatient  Cardiac CTA results showing coronary  aneurysm.     Procedure Narrative:   The risks andalternatives of the procedures and conscious sedation  were explained to the patient and informed consent was  obtained. The patient was brought to the cath lab and placed on the  exam table.  Access   Right radial artery:   The puncture site was infiltrated with 1% Lidocaine. Vascular access  was obtained using modified seldinger technique.    Diagnostic Findings:     Coronary Angiography   The coronary circulation is right dominant.      LM   Proximal left main: Angiography showed a large saccular aneurysm.  Cannot rule out aneurysmal thrombosis. Large saccular  anuerysm with LAD and Cx coming off of this confluence. .      Patient: LIZZIE ESPINOZA            MRN: [id number]  Study Date: 2021   03:56 PM      Page 1 of 3          LAD   Left anterior descending artery: Angiography shows mild  atherosclerosis. Angiography showed a medium fusiform aneurysm.    CX   Circumflex: Angiography showed a medium fusiform aneurysm.      RCA   Right coronary artery: Angiography shows mild atherosclerosis.  Angiography showed a medium localized aneurysm.    History and Risk Factors:   Hypertension:                               Yes   Prior MI:                                   No   Family History of Premature CAD:            No   Cerebrovascular Disease:                    Yes   Prior Stroke:                               Yes   Peripheral Arterial Disease:                Yes   Chronic Lung Disease:                       No   Prior CABG:                                 No     X-Ray:   Diagnostic Flouro time:      6.6 min.                    Exam record  DAP:  Total Flouro Time:           6.6 min.                    Exam total  DAP:    Exam Start Time:   03:56 PM   Exam End Time:     04:21 PM   Exam Duration:     25 min     Contrast:   Description                     Dose         Unit        Serial No.   Omnipaque                           74.000   mL     < end of copied text >                      9 <<== - @ 19:30                              10.8   6.49  )-----------( 238      ( 2021 06:19 )             35.3     -29    x   |  x   |  x   ----------------------------<  x   4.5   |  x   |  x     Ca    9.3      2021 06:18      proBNP: Serum Pro-Brain Natriuretic Peptide: 73 pg/mL (11-24 @ 19:36)    Lipid Profile:   HgA1c:   TSH:

## 2021-11-29 NOTE — CONSULT NOTE ADULT - ASSESSMENT
Large coronary aneurysm   not surgical candidate as per CTS  not transplant candidate as per CHF   agree with a/c and asa given large aneurysm   though traditionally warfarin is drug of choice , there are literature supporting use of NOAC , would increase dose of eliquis to 5 bid   will get vascular cardiology for their opinion as well     HTN  stable    CVA  on asa, a/c  add statin      Advanced care planning was discussed with patient and family.  Risks, benefits and alternatives of the cardiac treatments and medical therapy including procedures were discussed in detail and all questions were answered. Importance of compliance with medical therapy and lifestyle modification to improve cardiovascular health were addressed. Appropriate forms and patient educational materials were reviewed. 30 minutes face to face spent.

## 2021-11-29 NOTE — PROGRESS NOTE ADULT - SUBJECTIVE AND OBJECTIVE BOX
Subjective: Patient seen and examined resting in bed.     Medications:  amLODIPine   Tablet 10 milliGRAM(s) Oral daily  apixaban 5 milliGRAM(s) Oral two times a day  aspirin enteric coated 81 milliGRAM(s) Oral daily  ATENolol  Tablet 75 milliGRAM(s) Oral daily  cloNIDine 0.1 milliGRAM(s) Oral two times a day  influenza   Vaccine 0.5 milliLiter(s) IntraMuscular once  montelukast 10 milliGRAM(s) Oral daily  sodium chloride 0.9% lock flush 3 milliLiter(s) IV Push every 8 hours      Vitals:  Vital Signs Last 24 Hours  T(C): 36.6 (21 @ 13:01), Max: 36.7 (21 @ 19:23)  HR: 89 (21 @ 13:01) (71 - 89)  BP: 124/74 (21 @ 13:01) (114/60 - 133/86)  RR: 18 (21 @ 13:01) (18 - 18)  SpO2: 98% (21 @ 13:01) (96% - 100%)    Weight in k.4 ( @ 11:49)    I&O's Summary    2021 07:  -  2021 07:00  --------------------------------------------------------  IN: 740 mL / OUT: 0 mL / NET: 740 mL    2021 07:  -  2021 16:38  --------------------------------------------------------  IN: 420 mL / OUT: 300 mL / NET: 120 mL      Physical Exam   General: No distress. Comfortable.  Neck: Neck supple. JVP not elevated. No masses  Chest: Clear to auscultation bilaterally  CV: Normal S1 and S2. No murmurs, rub, or gallops. Radial pulses normal.  Abdomen: Soft, non-distended, non-tender  Neurology: Alert and oriented times     Labs:                        10.8   6.49  )-----------( 238      ( 2021 06:19 )             35.3     -    x   |  x   |  x   ----------------------------<  x   4.5   |  x   |  x     Ca    9.3      2021 06:18        CARDIAC MARKERS ( 2021 19:30 )  x     / x     / 66 U/L / x     / 1.0 ng/mL      Serum Pro-Brain Natriuretic Peptide: 73 pg/mL ( @ 19:36)  Creatine Kinase, Serum: 66 U/L (21 @ 19:30)  Creatine Kinase, Serum: 66 U/L (21 @ 19:30)

## 2021-11-29 NOTE — PROGRESS NOTE ADULT - ASSESSMENT
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 Found to have large saccular aneurysm of LM with LCX and LAD originating from this with medium aneursym of RCA in setting of normal biventricular function. Appears compensated and euvolemic but hypertensive. Unfortunately due to her overall debility and frailty she is not a transplant candidate. Will be signing off at this time.

## 2021-11-29 NOTE — PROGRESS NOTE ADULT - ASSESSMENT
This is a wheelchair bound 40 y/o female with PMHx of HTN, CVAx2 (2015) s/p trach and PEG with residual right sided weakness who presented to [facility] ED for abnormal outpatient Cardiac CTA results showing coronary aneurysm. CTA: LMCA, originates from the left coronary sinus of Valsalva and thereafter appears to supply a large aneurysmal dilatation located anterior and superior to LV. Calcification and possible thrombus visualized within the wall of this dilated aneurysmal portion which appears to compress the left atrium. RCA: dominant, demonstrates dilatation in the proximal portions of the vessel but appears patent. Patient had recent admission for chest pain, MI ruled out. Patient now transferred to [facility] for CT Surgery evaluation for possible coronary aneurysm repair.   11/25    OOB to chair,   vss    11/26 VSS overnight.  Will obtain cardiac cath today to evaluate coronaries. Chest pain/sob free overnight    11/27 VSS.  No bleeding at right radial cath site.  Found to have multiple coronary aneurysms.  Not likely a surgical candidate.  Heart Failure/Transplant team to evaluate for possible transplant work up.   11/28 VSS atenolol increased  to 75mg per  heart failure not a Transplant candidate.  11/29     vss     eliquis started   ,  OOB w/ asst

## 2021-11-29 NOTE — CONSULT NOTE ADULT - ASSESSMENT
Large coronary aneurysm   not surgical candidate as per CTS  not transplant candidate as per CHF    a/c and asa given large aneurysm   c/w eliquis   vascular cardiology consult    HTN  stable  c/w meds    CVA  on asa, a/c  statin

## 2021-11-29 NOTE — CONSULT NOTE ADULT - SUBJECTIVE AND OBJECTIVE BOX
Patient is a 41y old  Female who presents with a chief complaint of coronary aneurysm (29 Nov 2021 12:09)      INTERVAL HPI/OVERNIGHT EVENTS:    Medications:MEDICATIONS  (STANDING):  amLODIPine   Tablet 10 milliGRAM(s) Oral daily  apixaban 5 milliGRAM(s) Oral two times a day  aspirin enteric coated 81 milliGRAM(s) Oral daily  ATENolol  Tablet 75 milliGRAM(s) Oral daily  cloNIDine 0.1 milliGRAM(s) Oral two times a day  heparin   Injectable 5000 Unit(s) SubCutaneous every 8 hours  influenza   Vaccine 0.5 milliLiter(s) IntraMuscular once  montelukast 10 milliGRAM(s) Oral daily  sodium chloride 0.9% lock flush 3 milliLiter(s) IV Push every 8 hours    MEDICATIONS  (PRN):      Allergies: Allergies    No Known Allergies    Intolerances          FAMILY HISTORY:        PAST MEDICAL & SURGICAL HISTORY:  HTN (hypertension)    CVA (cerebrovascular accident)    History of tracheostomy        REVIEW OF SYSTEMS:  CONSTITUTIONAL: No fever, weight loss, or fatigue  EYES: No eye pain, visual disturbances, or discharge  ENMT:  No difficulty hearing, tinnitus, vertigo; No sinus or throat pain  NECK: No pain or stiffness  BREASTS: No pain, masses, or nipple discharge  RESPIRATORY: No cough, wheezing, chills or hemoptysis; No shortness of breath  CARDIOVASCULAR: No chest pain, palpitations, dizziness, or leg swelling  GASTROINTESTINAL: No abdominal or epigastric pain. No nausea, vomiting, or hematemesis; No diarrhea or constipation. No melena or hematochezia.  GENITOURINARY: No dysuria, frequency, hematuria, or incontinence  NEUROLOGICAL: No headaches, memory loss, loss of strength, numbness, or tremors  SKIN: No itching, burning, rashes, or lesions   LYMPH NODES: No enlarged glands  ENDOCRINE: No heat or cold intolerance; No hair loss  MUSCULOSKELETAL: No joint pain or swelling; No muscle, back, or extremity pain  PSYCHIATRIC: No depression, anxiety, mood swings, or difficulty sleeping  HEME/LYMPH: No easy bruising, or bleeding gums  ALLERY AND IMMUNOLOGIC: No hives or eczema    T(C): 36.7 (11-29-21 @ 04:40), Max: 36.9 (11-28-21 @ 13:43)  HR: 77 (11-29-21 @ 04:40) (71 - 84)  BP: 133/86 (11-29-21 @ 04:40) (114/60 - 146/95)  RR: 18 (11-29-21 @ 04:40) (18 - 18)  SpO2: 100% (11-29-21 @ 04:40) (96% - 100%)  Wt(kg): --Vital Signs Last 24 Hrs  T(C): 36.7 (29 Nov 2021 04:40), Max: 36.9 (28 Nov 2021 13:43)  T(F): 98.1 (29 Nov 2021 04:40), Max: 98.4 (28 Nov 2021 13:43)  HR: 77 (29 Nov 2021 04:40) (71 - 84)  BP: 133/86 (29 Nov 2021 04:40) (114/60 - 146/95)  BP(mean): 98 (28 Nov 2021 19:23) (98 - 98)  RR: 18 (29 Nov 2021 04:40) (18 - 18)  SpO2: 100% (29 Nov 2021 04:40) (96% - 100%)  I&O's Summary    28 Nov 2021 07:01  -  29 Nov 2021 07:00  --------------------------------------------------------  IN: 740 mL / OUT: 0 mL / NET: 740 mL    29 Nov 2021 07:01  -  29 Nov 2021 12:32  --------------------------------------------------------  IN: 240 mL / OUT: 0 mL / NET: 240 mL        PHYSICAL EXAM:  GENERAL: NAD, well-groomed, well-developed  HEAD:  Atraumatic, Normocephalic  EYES: EOMI, PERRLA, conjunctiva and sclera clear  ENMT: No tonsillar erythema, exudates, or enlargement; Moist mucous membranes, Good dentition, No lesions  NECK: Supple, No JVD, Normal thyroid  NERVOUS SYSTEM:  Alert & Oriented X3, Good concentration; Motor Strength 5/5 B/L upper and lower extremities; DTRs 2+ intact and symmetric  CHEST/LUNG: Clear to percussion bilaterally; No rales, rhonchi, wheezing, or rubs  HEART: Regular rate and rhythm; No murmurs, rubs, or gallops  ABDOMEN: Soft, Nontender, Nondistended; Bowel sounds present  EXTREMITIES:  2+ Peripheral Pulses, No clubbing, cyanosis, or edema  LYMPH: No lymphadenopathy noted  SKIN: No rashes or lesions    Consultant(s) Notes Reviewed:  [x ] YES  [ ] NO  Care Discussed with Consultants/Other Providerscpk [ x] YES  [ ] NO    LABS:                    CBC Full  -  ( 29 Nov 2021 06:19 )  WBC Count : 6.49 K/uL  RBC Count : 4.14 M/uL  Hemoglobin : 10.8 g/dL  Hematocrit : 35.3 %  Platelet Count - Automated : 238 K/uL  Mean Cell Volume : 85.3 fl  Mean Cell Hemoglobin : 26.1 pg  Mean Cell Hemoglobin Concentration : 30.6 gm/dL  Auto Neutrophil # : x  Auto Lymphocyte # : x  Auto Monocyte # : x  Auto Eosinophil # : x  Auto Basophil # : x  Auto Neutrophil % : x  Auto Lymphocyte % : x  Auto Monocyte % : x  Auto Eosinophil % : x  Auto Basophil % : x      11-29    x   |  x   |  x   ----------------------------<  x   4.5   |  x   |  x     Ca    9.3      29 Nov 2021 06:18              RADIOLOGY & ADDITIONAL TESTS:    Imaging Personally Reviewed:  [ ] YES  [ ] NO   Patient is a 41y old  Female who presents with a chief complaint of coronary aneurysm (29 Nov 2021 12:09)    hpi reviewed  INTERVAL HPI/OVERNIGHT EVENTS:    Medications:MEDICATIONS  (STANDING):  amLODIPine   Tablet 10 milliGRAM(s) Oral daily  apixaban 5 milliGRAM(s) Oral two times a day  aspirin enteric coated 81 milliGRAM(s) Oral daily  ATENolol  Tablet 75 milliGRAM(s) Oral daily  cloNIDine 0.1 milliGRAM(s) Oral two times a day  heparin   Injectable 5000 Unit(s) SubCutaneous every 8 hours  influenza   Vaccine 0.5 milliLiter(s) IntraMuscular once  montelukast 10 milliGRAM(s) Oral daily  sodium chloride 0.9% lock flush 3 milliLiter(s) IV Push every 8 hours    MEDICATIONS  (PRN):      Allergies: Allergies    No Known Allergies    Intolerances          FAMILY HISTORY:        PAST MEDICAL & SURGICAL HISTORY:  HTN (hypertension)    CVA (cerebrovascular accident)    History of tracheostomy        REVIEW OF SYSTEMS:  CONSTITUTIONAL: No fever, weight loss, or fatigue  EYES: No eye pain, visual disturbances, or discharge    RESPIRATORY: No cough, wheezing, chills or hemoptysis;  CARDIOVASCULAR: No chest pain, palpitations, dizziness, or leg swelling  GASTROINTESTINAL: No abdominal or epigastric pain. No nausea, vomiting, or hematemesis; No diarrhea or constipation. No melena or hematochezia.  GENITOURINARY: No dysuria, frequency, hematuria, or incontinence  NEUROLOGICAL: No headaches,     T(C): 36.7 (11-29-21 @ 04:40), Max: 36.9 (11-28-21 @ 13:43)  HR: 77 (11-29-21 @ 04:40) (71 - 84)  BP: 133/86 (11-29-21 @ 04:40) (114/60 - 146/95)  RR: 18 (11-29-21 @ 04:40) (18 - 18)  SpO2: 100% (11-29-21 @ 04:40) (96% - 100%)  Wt(kg): --Vital Signs Last 24 Hrs  T(C): 36.7 (29 Nov 2021 04:40), Max: 36.9 (28 Nov 2021 13:43)  T(F): 98.1 (29 Nov 2021 04:40), Max: 98.4 (28 Nov 2021 13:43)  HR: 77 (29 Nov 2021 04:40) (71 - 84)  BP: 133/86 (29 Nov 2021 04:40) (114/60 - 146/95)  BP(mean): 98 (28 Nov 2021 19:23) (98 - 98)  RR: 18 (29 Nov 2021 04:40) (18 - 18)  SpO2: 100% (29 Nov 2021 04:40) (96% - 100%)  I&O's Summary    28 Nov 2021 07:01  -  29 Nov 2021 07:00  --------------------------------------------------------  IN: 740 mL / OUT: 0 mL / NET: 740 mL    29 Nov 2021 07:01  -  29 Nov 2021 12:32  --------------------------------------------------------  IN: 240 mL / OUT: 0 mL / NET: 240 mL        PHYSICAL EXAM:  GENERAL: NAD, well-groomed, well-developed  HEAD:  Atraumatic, Normocephalic  EYES: EOMI, PERRLA, conjunctiva and sclera clear  ENMT: No tonsillar erythema, exudates, or enlargement;  NECK: Supple, No JVD, Normal thyroid  NERVOUS SYSTEM:  Alert & Oriented   Motor Strength 5/5 B/L upper and lower extremities; DTRs 2+ intact and symmetric  CHEST/LUNG: Clear to percussion bilaterally; No rales, rhonchi, wheezing, or rubs  HEART: Regular rate and rhythm; No murmurs, rubs, or gallops  ABDOMEN: Soft, Nontender, Nondistended; Bowel sounds present  EXTREMITIES:  2+ Peripheral Pulses, No clubbing, cyanosis, or edema    SKIN: No rashes or lesions    Consultant(s) Notes Reviewed:  [x ] YES  [ ] NO  Care Discussed with Consultants/Other Providerscpk [ x] YES  [ ] NO    LABS:                    CBC Full  -  ( 29 Nov 2021 06:19 )  WBC Count : 6.49 K/uL  RBC Count : 4.14 M/uL  Hemoglobin : 10.8 g/dL  Hematocrit : 35.3 %  Platelet Count - Automated : 238 K/uL  Mean Cell Volume : 85.3 fl  Mean Cell Hemoglobin : 26.1 pg  Mean Cell Hemoglobin Concentration : 30.6 gm/dL  Auto Neutrophil # : x  Auto Lymphocyte # : x  Auto Monocyte # : x  Auto Eosinophil # : x  Auto Basophil # : x  Auto Neutrophil % : x  Auto Lymphocyte % : x  Auto Monocyte % : x  Auto Eosinophil % : x  Auto Basophil % : x      11-29    x   |  x   |  x   ----------------------------<  x   4.5   |  x   |  x     Ca    9.3      29 Nov 2021 06:18              RADIOLOGY & ADDITIONAL TESTS:    Imaging Personally Reviewed:  [ ] YES  [ ] NO Detail Level: Zone Detail Level: Detailed Detail Level: Generalized

## 2021-11-29 NOTE — PROGRESS NOTE ADULT - PROBLEM SELECTOR PLAN 1
-c/w clonidine 0.1 mg twice/day  -c/w norvasc 10 mg daily  -increase atenolol to 75 mg daily (goal HR 60-70)  - goal SBP <120/80.

## 2021-11-30 LAB
ANION GAP SERPL CALC-SCNC: 10 MMOL/L — SIGNIFICANT CHANGE UP (ref 5–17)
BUN SERPL-MCNC: 19 MG/DL — SIGNIFICANT CHANGE UP (ref 7–23)
CALCIUM SERPL-MCNC: 9.4 MG/DL — SIGNIFICANT CHANGE UP (ref 8.4–10.5)
CHLORIDE SERPL-SCNC: 100 MMOL/L — SIGNIFICANT CHANGE UP (ref 96–108)
CO2 SERPL-SCNC: 25 MMOL/L — SIGNIFICANT CHANGE UP (ref 22–31)
CREAT SERPL-MCNC: 1.04 MG/DL — SIGNIFICANT CHANGE UP (ref 0.5–1.3)
GLUCOSE SERPL-MCNC: 94 MG/DL — SIGNIFICANT CHANGE UP (ref 70–99)
HCT VFR BLD CALC: 34.1 % — LOW (ref 34.5–45)
HGB BLD-MCNC: 10.4 G/DL — LOW (ref 11.5–15.5)
MCHC RBC-ENTMCNC: 26.1 PG — LOW (ref 27–34)
MCHC RBC-ENTMCNC: 30.5 GM/DL — LOW (ref 32–36)
MCV RBC AUTO: 85.5 FL — SIGNIFICANT CHANGE UP (ref 80–100)
NRBC # BLD: 0 /100 WBCS — SIGNIFICANT CHANGE UP (ref 0–0)
PLATELET # BLD AUTO: 243 K/UL — SIGNIFICANT CHANGE UP (ref 150–400)
POTASSIUM SERPL-MCNC: 4.3 MMOL/L — SIGNIFICANT CHANGE UP (ref 3.5–5.3)
POTASSIUM SERPL-SCNC: 4.3 MMOL/L — SIGNIFICANT CHANGE UP (ref 3.5–5.3)
RBC # BLD: 3.99 M/UL — SIGNIFICANT CHANGE UP (ref 3.8–5.2)
RBC # FLD: 13.9 % — SIGNIFICANT CHANGE UP (ref 10.3–14.5)
SODIUM SERPL-SCNC: 135 MMOL/L — SIGNIFICANT CHANGE UP (ref 135–145)
WBC # BLD: 6.07 K/UL — SIGNIFICANT CHANGE UP (ref 3.8–10.5)
WBC # FLD AUTO: 6.07 K/UL — SIGNIFICANT CHANGE UP (ref 3.8–10.5)

## 2021-11-30 PROCEDURE — 99232 SBSQ HOSP IP/OBS MODERATE 35: CPT

## 2021-11-30 PROCEDURE — 99223 1ST HOSP IP/OBS HIGH 75: CPT

## 2021-11-30 RX ORDER — APIXABAN 2.5 MG/1
5 TABLET, FILM COATED ORAL
Refills: 0 | Status: DISCONTINUED | OUTPATIENT
Start: 2021-11-30 | End: 2021-12-03

## 2021-11-30 RX ADMIN — SODIUM CHLORIDE 3 MILLILITER(S): 9 INJECTION INTRAMUSCULAR; INTRAVENOUS; SUBCUTANEOUS at 06:16

## 2021-11-30 RX ADMIN — AMLODIPINE BESYLATE 10 MILLIGRAM(S): 2.5 TABLET ORAL at 06:28

## 2021-11-30 RX ADMIN — Medication 0.1 MILLIGRAM(S): at 06:28

## 2021-11-30 RX ADMIN — ATORVASTATIN CALCIUM 20 MILLIGRAM(S): 80 TABLET, FILM COATED ORAL at 21:12

## 2021-11-30 RX ADMIN — APIXABAN 2.5 MILLIGRAM(S): 2.5 TABLET, FILM COATED ORAL at 06:28

## 2021-11-30 RX ADMIN — SODIUM CHLORIDE 3 MILLILITER(S): 9 INJECTION INTRAMUSCULAR; INTRAVENOUS; SUBCUTANEOUS at 23:04

## 2021-11-30 RX ADMIN — Medication 81 MILLIGRAM(S): at 11:36

## 2021-11-30 RX ADMIN — APIXABAN 5 MILLIGRAM(S): 2.5 TABLET, FILM COATED ORAL at 17:49

## 2021-11-30 RX ADMIN — MONTELUKAST 10 MILLIGRAM(S): 4 TABLET, CHEWABLE ORAL at 11:36

## 2021-11-30 RX ADMIN — Medication 0.1 MILLIGRAM(S): at 17:49

## 2021-11-30 RX ADMIN — SODIUM CHLORIDE 3 MILLILITER(S): 9 INJECTION INTRAMUSCULAR; INTRAVENOUS; SUBCUTANEOUS at 12:51

## 2021-11-30 RX ADMIN — ATENOLOL 75 MILLIGRAM(S): 25 TABLET ORAL at 06:28

## 2021-11-30 NOTE — PROGRESS NOTE ADULT - PROBLEM SELECTOR PROBLEM 2
HTN (hypertension)
Coronary aneurysm
HTN (hypertension)
HTN (hypertension)

## 2021-11-30 NOTE — PROGRESS NOTE ADULT - ASSESSMENT
Large coronary aneurysm   cards eval noted   not surgical candidate as per CTS  not transplant candidate as per CHF    a/c and asa given large aneurysm   c/w eliquis   vascular cardiology consult    HTN  stable  c/w meds    CVA  on asa, a/c  statin    d/c planning if cleared by vasc cards/ cards/

## 2021-11-30 NOTE — PROGRESS NOTE ADULT - SUBJECTIVE AND OBJECTIVE BOX
DATE OF SERVICE: 11-30-21 @ 11:14  CHIEF COMPLAINT:Patient is a 41y old  Female who presents with a chief complaint of coronary aneurysm (30 Nov 2021 11:01)    	        PAST MEDICAL & SURGICAL HISTORY:  HTN (hypertension)    CVA (cerebrovascular accident)    History of tracheostomy            REVIEW OF SYSTEMS:    RESPIRATORY: No cough, wheezing, chills or hemoptysis; No Shortness of Breath  CARDIOVASCULAR: No chest pain, palpitations, passing out, dizziness, or leg swelling  GASTROINTESTINAL: No abdominal or epigastric pain. No nausea, vomiting, or hematemesis; No diarrhea or constipation. No melena or hematochezia.  GENITOURINARY: No dysuria, frequency, hematuria, or incontinence  NEUROLOGICAL: No headaches,  MUSCULOSKELETAL: No joint pain or swelling; No muscle, back, or extremity pain    Medications:  MEDICATIONS  (STANDING):  amLODIPine   Tablet 10 milliGRAM(s) Oral daily  apixaban 5 milliGRAM(s) Oral two times a day  aspirin enteric coated 81 milliGRAM(s) Oral daily  ATENolol  Tablet 75 milliGRAM(s) Oral daily  atorvastatin 20 milliGRAM(s) Oral at bedtime  cloNIDine 0.1 milliGRAM(s) Oral two times a day  influenza   Vaccine 0.5 milliLiter(s) IntraMuscular once  montelukast 10 milliGRAM(s) Oral daily  sodium chloride 0.9% lock flush 3 milliLiter(s) IV Push every 8 hours    MEDICATIONS  (PRN):    	    PHYSICAL EXAM:  T(C): 36.8 (11-30-21 @ 04:50), Max: 36.8 (11-29-21 @ 19:12)  HR: 78 (11-30-21 @ 04:50) (71 - 89)  BP: 122/79 (11-30-21 @ 04:50) (120/76 - 128/74)  RR: 18 (11-30-21 @ 04:50) (18 - 18)  SpO2: 99% (11-30-21 @ 04:50) (98% - 99%)  Wt(kg): --  I&O's Summary    29 Nov 2021 07:01  -  30 Nov 2021 07:00  --------------------------------------------------------  IN: 900 mL / OUT: 650 mL / NET: 250 mL    30 Nov 2021 07:01  -  30 Nov 2021 11:14  --------------------------------------------------------  IN: 120 mL / OUT: 0 mL / NET: 120 mL        Appearance: Normal	  HEENT:   Normal oral mucosa, PERRL, EOMI	  Lymphatic: No lymphadenopathy  Cardiovascular: Normal S1 S2, No JVD, No murmurs, No edema  Respiratory: Lungs clear to auscultation	  Psychiatry: A & O  Gastrointestinal:  Soft, Non-tender, + BS	  Skin: No rashes, No ecchymoses, No cyanosis	  Neurologic: Non-focal  Extremities: Normal range of motion, No clubbing, cyanosis or edema  Vascular: Peripheral pulses palpable 2+ bilaterally    TELEMETRY: 	    ECG:  	  RADIOLOGY:  OTHER: 	  	  LABS:	 	    CARDIAC MARKERS:                                10.4   6.07  )-----------( 243      ( 30 Nov 2021 07:42 )             34.1     11-30    135  |  100  |  19  ----------------------------<  94  4.3   |  25  |  1.04    Ca    9.4      30 Nov 2021 07:42      proBNP:   Lipid Profile:   HgA1c:   TSH:

## 2021-11-30 NOTE — PROGRESS NOTE ADULT - PROBLEM SELECTOR PLAN 2
Continue home amlodipine 10 daily, atenolol 50 daily, clonidine 0.1mg BID
Continue home amlodipine 10 daily, atenolol 50 daily, clonidine 0.1mg BID
Continue home amlodipine 10 daily, atenolol 50 daily, clonidine 0.1mg BID  atenolol increased to 75 daily
-large saccular aneurysm of LM   -no current angina with negative troponin or ECG changes  - consider vascular medicine consult (Dr. Steven)  - c/w ASA 8 1mg daily  - due to her overall debility and frailty she unfortunately she is not a transplant candidate
Continue home amlodipine 10 daily, atenolol 50 daily, clonidine 0.1mg BID
Continue home amlodipine 10 daily, atenolol 50 daily, clonidine 0.1mg BID  atenolol  75 daily
Continue home amlodipine 10 daily, atenolol 50 daily, clonidine 0.1mg BID  atenolol  75 daily

## 2021-11-30 NOTE — CONSULT NOTE ADULT - ASSESSMENT
Assessment:  1. Diffuse coronary aneurysms  2. Hypertension  3. History of brainstem stroke in 2015      Plan:  1. Recommend systemic evaluation for vascular aneurysms, either as inpatient or outpatient: MRA head without contrast, CTA of neck, abdomen and pelvis, and lower extremity arterial duplex  2. Agree with anticoagulation to reduce risk of aneurysm thrombosis, continue Eliquis 5mg BID  3. Counseled on BP and HR management to reduce risk of aneurysm rupture  4. Continue statin for atherosclerosis prevention    Thank you    Vascular Cardiology Service    Please call with any questions:   DIRECT SERVICE NUMBER:  [phone]  Office [phone]  email:  [email or web address]       Assessment:  1. Diffuse coronary aneurysms  - Normal EF, not candidate for CABG or transplant after evaluation by respective teams  2. Hypertension  3. History of brainstem stroke in 2015      Plan:  1. Recommend systemic evaluation for vascular aneurysms, either as inpatient or outpatient: MRA head without contrast, CTA of neck, abdomen and pelvis, and lower extremity arterial duplex  2. Agree with anticoagulation to reduce risk of aneurysm thrombosis, continue Eliquis 5mg BID  3. Counseled on BP and HR management to reduce risk of aneurysm rupture  4. Continue statin for atherosclerosis prevention    Thank you    Vascular Cardiology Service    Please call with any questions:   DIRECT SERVICE NUMBER:  [phone]  Office [phone]  email:  [email or web address]

## 2021-11-30 NOTE — CONSULT NOTE ADULT - SUBJECTIVE AND OBJECTIVE BOX
Vascular Cardiology Consult Note    DIRECT SERVICE NUMBER:  [phone]           EMAIL [email or web address]   OFFICE [phone]    CC:  Coronary aneurysms    HPI:    Patient is a 40 yo F with PMH of R MCA (2015, unclear etiology with persistent R sided weakness, wheelchair bound, s/p trach and PEG), HTN and recently diagnosed coronary aneurysm who presented to [facility] for surgical evaluation. Diagnostic coronary angiogram demonstrates diffuse three vessel aneurysms, not a CABG candidate or transplant candidate. Vascular cardiology consulted for additional evaluation.  Patient is a former smoker, otherwise no known     Allergies    No Known Allergies    Intolerances    	    MEDICATIONS:  amLODIPine   Tablet 10 milliGRAM(s) Oral daily  apixaban 5 milliGRAM(s) Oral two times a day  aspirin enteric coated 81 milliGRAM(s) Oral daily  ATENolol  Tablet 75 milliGRAM(s) Oral daily  cloNIDine 0.1 milliGRAM(s) Oral two times a day      montelukast 10 milliGRAM(s) Oral daily        atorvastatin 20 milliGRAM(s) Oral at bedtime    influenza   Vaccine 0.5 milliLiter(s) IntraMuscular once  sodium chloride 0.9% lock flush 3 milliLiter(s) IV Push every 8 hours      PAST MEDICAL & SURGICAL HISTORY:  HTN (hypertension)    CVA (cerebrovascular accident)    History of tracheostomy        FAMILY HISTORY:      SOCIAL HISTORY:  unchanged    REVIEW OF SYSTEMS:  CONSTITUTIONAL: No fever, weight loss, or fatigue  EYES: No eye pain, visual disturbances, or discharge  ENT:  No difficulty hearing, tinnitus, vertigo; No sinus or throat pain  NECK: No pain or stiffness  RESPIRATORY:    CARDIOVASCULAR:    GASTROINTESTINAL: No abdominal or epigastric pain. No nausea, vomiting, or hematemesis; No diarrhea or constipation. No melena or hematochezia.  GENITOURINARY: No dysuria, frequency, hematuria, or incontinence  NEUROLOGICAL: No headaches, memory loss, loss of strength, numbness, or tremors  SKIN:   LYMPH Nodes: No enlarged glands  ENDOCRINE: No heat or cold intolerance; No hair loss  MUSCULOSKELETAL: No joint pain or swelling; No muscle, back, or extremity pain  PSYCHIATRIC: No depression, anxiety, mood swings, or difficulty sleeping  HEME/LYMPH: No easy bruising, or bleeding gums  ALLERGY AND IMMUNOLOGIC: No hives or eczema	    [ x] All others negative	  [ ] Unable to obtain    PHYSICAL EXAM:  T(C): 36.8 (11-30-21 @ 04:50), Max: 36.8 (11-29-21 @ 19:12)  HR: 78 (11-30-21 @ 04:50) (71 - 89)  BP: 122/79 (11-30-21 @ 04:50) (120/76 - 128/74)  RR: 18 (11-30-21 @ 04:50) (18 - 18)  SpO2: 99% (11-30-21 @ 04:50) (98% - 99%)  Wt(kg): --  I&O's Summary    29 Nov 2021 07:01  -  30 Nov 2021 07:00  --------------------------------------------------------  IN: 900 mL / OUT: 650 mL / NET: 250 mL    30 Nov 2021 07:01  -  30 Nov 2021 11:02  --------------------------------------------------------  IN: 120 mL / OUT: 0 mL / NET: 120 mL        Appearance:  	  HEENT:   Normal oral mucosa, PERRL, EOMI	  Carotid:   Right:    Left:    Lymphatic: No lymphadenopathy  Cardiovascular:    Respiratory:  	  Psychiatry:  AAO x   Gastrointestinal:  Soft, Non-tender, + BS	  Skin: No rashes, No ecchymoses, No cyanosis	  Neurologic:    Extremities:      Vascular Pulse Exam:  Right DP: []palpable []non-palpable []audible      Left DP :   []palpable []non-palpable []audible  Right PT: []palpable [] non-palpable []audible   Left PT:  [] palpable [] non-palpable []audible         Foot Exam:        LABS:	 	    CBC Full  -  ( 30 Nov 2021 07:42 )  WBC Count : 6.07 K/uL  Hemoglobin : 10.4 g/dL  Hematocrit : 34.1 %  Platelet Count - Automated : 243 K/uL  Mean Cell Volume : 85.5 fl  Mean Cell Hemoglobin : 26.1 pg  Mean Cell Hemoglobin Concentration : 30.5 gm/dL  Auto Neutrophil # : x  Auto Lymphocyte # : x  Auto Monocyte # : x  Auto Eosinophil # : x  Auto Basophil # : x  Auto Neutrophil % : x  Auto Lymphocyte % : x  Auto Monocyte % : x  Auto Eosinophil % : x  Auto Basophil % : x    11-30    135  |  100  |  19  ----------------------------<  94  4.3   |  25  |  1.04  11-29    x   |  x   |  x   ----------------------------<  x   4.5   |  x   |  x     Ca    9.4      30 Nov 2021 07:42  Ca    9.3      29 Nov 2021 06:18            Assessment:  1.            Plan:  1.          Thank you      Vascular Cardiology Service    Please call with any questions:   DIRECT SERVICE NUMBER:  [phone]  [facility] [phone]  email:  [email or web address]     Vascular Cardiology Consult Note    DIRECT SERVICE NUMBER:  [phone]           EMAIL [email or web address]   OFFICE [phone]    CC:  Coronary aneurysms    HPI:    Patient is a 40 yo F with PMH of R MCA (2015, unclear etiology with residual L sided weakness, wheelchair dependent, history of trach and PEG), HTN and recently diagnosed coronary aneurysm who presented to [facility] for surgical evaluation. Diagnostic coronary angiogram demonstrates diffuse three vessel aneurysms, not a CABG candidate or transplant candidate. Vascular cardiology consulted for additional evaluation.  Patient is a former smoker, otherwise no known vascular history. Unclear if she had Kawasaki disease as a child. Reports stroke in 2015 2/2 brainstem hemorrhage due to ?genetic disease and not compliant with medications. Patient needs walker and assistance for ambulation, does participate in light exercise on a weekly basis.    Allergies  No Known Allergies    Intolerances    MEDICATIONS:  amLODIPine   Tablet 10 milliGRAM(s) Oral daily  apixaban 5 milliGRAM(s) Oral two times a day  aspirin enteric coated 81 milliGRAM(s) Oral daily  ATENolol  Tablet 75 milliGRAM(s) Oral daily  cloNIDine 0.1 milliGRAM(s) Oral two times a day  montelukast 10 milliGRAM(s) Oral daily  atorvastatin 20 milliGRAM(s) Oral at bedtime  influenza   Vaccine 0.5 milliLiter(s) IntraMuscular once  sodium chloride 0.9% lock flush 3 milliLiter(s) IV Push every 8 hours    PAST MEDICAL & SURGICAL HISTORY:  HTN (hypertension)  CVA (cerebrovascular accident)  History of tracheostomy    FAMILY HISTORY:    SOCIAL HISTORY:  former smoker    REVIEW OF SYSTEMS:  CONSTITUTIONAL: No fever, weight loss, or fatigue  EYES: No eye pain, visual disturbances, or discharge  ENT:  No difficulty hearing, tinnitus, vertigo; No sinus or throat pain  NECK: No pain or stiffness  RESPIRATORY: No shortness of breath    CARDIOVASCULAR: No chest pain, palpitations  GASTROINTESTINAL: No abdominal or epigastric pain. No nausea, vomiting, or hematemesis; No diarrhea or constipation. No melena or hematochezia.  GENITOURINARY: No dysuria, frequency, hematuria, or incontinence  NEUROLOGICAL: No headaches, memory loss, numbness, or tremors; +L sided weakness  SKIN: No rash  LYMPH Nodes: No enlarged glands  ENDOCRINE: No heat or cold intolerance; No hair loss  MUSCULOSKELETAL: No joint pain or swelling; No muscle, back, or extremity pain  PSYCHIATRIC: No depression, anxiety, mood swings, or difficulty sleeping  HEME/LYMPH: No easy bruising, or bleeding gums  ALLERGY AND IMMUNOLOGIC: No hives or eczema	    [ x] All others negative    PHYSICAL EXAM:  T(C): 36.8 (11-30-21 @ 04:50), Max: 36.8 (11-29-21 @ 19:12)  HR: 78 (11-30-21 @ 04:50) (71 - 89)  BP: 122/79 (11-30-21 @ 04:50) (120/76 - 128/74)  RR: 18 (11-30-21 @ 04:50) (18 - 18)  SpO2: 99% (11-30-21 @ 04:50) (98% - 99%)  Wt(kg): --  I&O's Summary    29 Nov 2021 07:01  -  30 Nov 2021 07:00  --------------------------------------------------------  IN: 900 mL / OUT: 650 mL / NET: 250 mL    30 Nov 2021 07:01  -  30 Nov 2021 11:02  --------------------------------------------------------  IN: 120 mL / OUT: 0 mL / NET: 120 mL    Appearance: No acute distress  HEENT:   Normal oral mucosa, EOMI with mild nystagus  Carotid: No bruits  Cardiovascular: Regular rate and rhythm, equal S1 and S2, no murmurs  Respiratory:  No increased work of breathing  Psychiatry:  AAO x 3  Gastrointestinal:  Soft  Skin: No rashes, No ecchymoses, No cyanosis	  Neurologic:  L sided weakness of UE and LE  Extremities:  No edema    LABS:	 	    CBC Full  -  ( 30 Nov 2021 07:42 )  WBC Count : 6.07 K/uL  Hemoglobin : 10.4 g/dL  Hematocrit : 34.1 %  Platelet Count - Automated : 243 K/uL  Mean Cell Volume : 85.5 fl  Mean Cell Hemoglobin : 26.1 pg  Mean Cell Hemoglobin Concentration : 30.5 gm/dL  Auto Neutrophil # : x  Auto Lymphocyte # : x  Auto Monocyte # : x  Auto Eosinophil # : x  Auto Basophil # : x  Auto Neutrophil % : x  Auto Lymphocyte % : x  Auto Monocyte % : x  Auto Eosinophil % : x  Auto Basophil % : x    11-30    135  |  100  |  19  ----------------------------<  94  4.3   |  25  |  1.04  11-29    x   |  x   |  x   ----------------------------<  x   4.5   |  x   |  x     Ca    9.4      30 Nov 2021 07:42  Ca    9.3      29 Nov 2021 06:18    < from: Cardiac Catheterization (11.26.21 @ 15:56) >  Coronary Angiography   The coronary circulation is right dominant.      LM   Proximal left main: Angiography showed a large saccular aneurysm.  Cannot rule out aneurysmal thrombosis. Large saccular  anuerysm with LAD and Cx coming off of this confluence. .      LAD   Left anterior descending artery: Angiography shows mild  atherosclerosis. Angiography showed a medium fusiform aneurysm.    CX   Circumflex: Angiography showed a medium fusiform aneurysm.      RCA   Right coronary artery: Angiography shows mild atherosclerosis.  Angiography showed a medium localized aneurysm.    < end of copied text > Vascular Cardiology Consult Note    DIRECT SERVICE NUMBER:  [phone]           EMAIL [email or web address]   OFFICE [phone]    CC:  Coronary aneurysms    HPI:    Patient is a 40 yo F with PMH of R MCA (2015, unclear etiology with residual L sided weakness, wheelchair dependent, history of trach and PEG), HTN and recently diagnosed coronary aneurysm who presented to [facility] for surgical evaluation. Diagnostic coronary angiogram demonstrates diffuse three vessel aneurysms, not a CABG candidate or transplant candidate. Vascular cardiology consulted for additional evaluation.  Patient is a former smoker, otherwise no known vascular history. Unclear if she had Kawasaki disease as a child. Reports stroke in 2015 2/2 brainstem hemorrhage due to ?genetic disease and not compliant with medications. Patient needs walker and assistance for ambulation, does participate in light exercise on a weekly basis.    Allergies  No Known Allergies    Intolerances    MEDICATIONS:  amLODIPine   Tablet 10 milliGRAM(s) Oral daily  apixaban 5 milliGRAM(s) Oral two times a day  aspirin enteric coated 81 milliGRAM(s) Oral daily  ATENolol  Tablet 75 milliGRAM(s) Oral daily  cloNIDine 0.1 milliGRAM(s) Oral two times a day  montelukast 10 milliGRAM(s) Oral daily  atorvastatin 20 milliGRAM(s) Oral at bedtime  influenza   Vaccine 0.5 milliLiter(s) IntraMuscular once  sodium chloride 0.9% lock flush 3 milliLiter(s) IV Push every 8 hours    PAST MEDICAL & SURGICAL HISTORY:  HTN (hypertension)  CVA (cerebrovascular accident)  History of tracheostomy    FAMILY HISTORY:    SOCIAL HISTORY:  former smoker    REVIEW OF SYSTEMS:  CONSTITUTIONAL: No fever, weight loss, or fatigue  EYES: No eye pain, visual disturbances, or discharge  ENT:  No difficulty hearing, tinnitus, vertigo; No sinus or throat pain  NECK: No pain or stiffness  RESPIRATORY: No shortness of breath    CARDIOVASCULAR: No chest pain, palpitations  GASTROINTESTINAL: No abdominal or epigastric pain. No nausea, vomiting, or hematemesis; No diarrhea or constipation. No melena or hematochezia.  GENITOURINARY: No dysuria, frequency, hematuria, or incontinence  NEUROLOGICAL: No headaches, memory loss, numbness, or tremors; +L sided weakness  SKIN: No rash  LYMPH Nodes: No enlarged glands  ENDOCRINE: No heat or cold intolerance; No hair loss  MUSCULOSKELETAL: No joint pain or swelling; No muscle, back, or extremity pain  PSYCHIATRIC: No depression, anxiety, mood swings, or difficulty sleeping  HEME/LYMPH: No easy bruising, or bleeding gums  ALLERGY AND IMMUNOLOGIC: No hives or eczema	    [ x] All others negative    PHYSICAL EXAM:  T(C): 36.8 (11-30-21 @ 04:50), Max: 36.8 (11-29-21 @ 19:12)  HR: 78 (11-30-21 @ 04:50) (71 - 89)  BP: 122/79 (11-30-21 @ 04:50) (120/76 - 128/74)  RR: 18 (11-30-21 @ 04:50) (18 - 18)  SpO2: 99% (11-30-21 @ 04:50) (98% - 99%)  Wt(kg): --  I&O's Summary    29 Nov 2021 07:01  -  30 Nov 2021 07:00  --------------------------------------------------------  IN: 900 mL / OUT: 650 mL / NET: 250 mL    30 Nov 2021 07:01  -  30 Nov 2021 11:02  --------------------------------------------------------  IN: 120 mL / OUT: 0 mL / NET: 120 mL    Appearance: No acute distress  HEENT:   Normal oral mucosa, EOMI with mild nystagus  Carotid: No bruits  Cardiovascular: Regular rate and rhythm, equal S1 and S2, no murmurs  Respiratory:  No increased work of breathing  Psychiatry:  AAO x 3  Gastrointestinal:  Soft  Skin: No rashes, No ecchymoses, No cyanosis	  Neurologic:  L sided weakness of UE and LE  Extremities:  No edema    LABS:	 	    CBC Full  -  ( 30 Nov 2021 07:42 )  WBC Count : 6.07 K/uL  Hemoglobin : 10.4 g/dL  Hematocrit : 34.1 %  Platelet Count - Automated : 243 K/uL  Mean Cell Volume : 85.5 fl  Mean Cell Hemoglobin : 26.1 pg  Mean Cell Hemoglobin Concentration : 30.5 gm/dL  Auto Neutrophil # : x  Auto Lymphocyte # : x  Auto Monocyte # : x  Auto Eosinophil # : x  Auto Basophil # : x  Auto Neutrophil % : x  Auto Lymphocyte % : x  Auto Monocyte % : x  Auto Eosinophil % : x  Auto Basophil % : x    11-30    135  |  100  |  19  ----------------------------<  94  4.3   |  25  |  1.04  11-29    x   |  x   |  x   ----------------------------<  x   4.5   |  x   |  x     Ca    9.4      30 Nov 2021 07:42  Ca    9.3      29 Nov 2021 06:18    < from: Cardiac Catheterization (11.26.21 @ 15:56) >  Coronary Angiography   The coronary circulation is right dominant.      LM   Proximal left main: Angiography showed a large saccular aneurysm.  Cannot rule out aneurysmal thrombosis. Large saccular  anuerysm with LAD and Cx coming off of this confluence. .      LAD   Left anterior descending artery: Angiography shows mild  atherosclerosis. Angiography showed a medium fusiform aneurysm.    CX   Circumflex: Angiography showed a medium fusiform aneurysm.      RCA   Right coronary artery: Angiography shows mild atherosclerosis.  Angiography showed a medium localized aneurysm.    < end of copied text >    < from: TTE with Doppler (w/Cont) (11.24.21 @ 18:57) >  Conclusions:  1. Normal mitral valve. Mild mitral regurgitation.  2. Aortic valve leaflet morphology not well visualized.  Minimal aortic regurgitation.  3. Normal left ventricular systolic function. No segmental  wall motion abnormalities.  Endocardial visualization  enhanced with intravenous injection of Ultrasonic Enhancing  Agent (Definity).  4. Normal right ventricular size and function.    < end of copied text >

## 2021-11-30 NOTE — PROGRESS NOTE ADULT - PROBLEM SELECTOR PROBLEM 1
Coronary aneurysm
HTN (hypertension)
Coronary aneurysm

## 2021-11-30 NOTE — PROGRESS NOTE ADULT - ASSESSMENT
This is a wheelchair bound 40 y/o female with PMHx of HTN, CVAx2 (2015) s/p trach and PEG with residual right sided weakness who presented to [facility] ED for abnormal outpatient Cardiac CTA results showing coronary aneurysm. CTA: LMCA, originates from the left coronary sinus of Valsalva and thereafter appears to supply a large aneurysmal dilatation located anterior and superior to LV. Calcification and possible thrombus visualized within the wall of this dilated aneurysmal portion which appears to compress the left atrium. RCA: dominant, demonstrates dilatation in the proximal portions of the vessel but appears patent. Patient had recent admission for chest pain, MI ruled out. Patient now transferred to [facility] for CT Surgery evaluation for possible coronary aneurysm repair.   11/25    OOB to chair,   vss    11/26 VSS overnight.  Will obtain cardiac cath today to evaluate coronaries. Chest pain/sob free overnight    11/27 VSS.  No bleeding at right radial cath site.  Found to have multiple coronary aneurysms.  Not likely a surgical candidate.  Heart Failure/Transplant team to evaluate for possible transplant work up.   11/28 VSS atenolol increased  to 75mg per  heart failure not a Transplant candidate.  11/29     vss     eliquis started   ,  OOB w/ asst  11/30  Transferred care to Dr Abernathy service no CTS treatments needed

## 2021-11-30 NOTE — PROGRESS NOTE ADULT - ASSESSMENT
Large coronary aneurysm   not surgical candidate as per CTS  not transplant candidate as per CHF   agree with a/c and asa given large aneurysm   appreciate vascular cardiology input     HTN  stable    CVA  on asa, a/c  add statin      Advanced care planning was discussed with patient and family.  Risks, benefits and alternatives of the cardiac treatments and medical therapy including procedures were discussed in detail and all questions were answered. Importance of compliance with medical therapy and lifestyle modification to improve cardiovascular health were addressed. Appropriate forms and patient educational materials were reviewed. 30 minutes face to face spent.

## 2021-11-30 NOTE — PROGRESS NOTE ADULT - PROBLEM/PLAN-2
DISPLAY PLAN FREE TEXT
Consent (Nose)/Introductory Paragraph: The rationale for Mohs was explained to the patient and consent was obtained. The risks, benefits and alternatives to therapy were discussed in detail. Specifically, the risks of nasal deformity, changes in the flow of air through the nose, infection, scarring, bleeding, prolonged wound healing, incomplete removal, allergy to anesthesia, nerve injury and recurrence were addressed. Prior to the procedure, the treatment site was clearly identified and confirmed by the patient. All components of Universal Protocol/PAUSE Rule completed.

## 2021-11-30 NOTE — PROGRESS NOTE ADULT - SUBJECTIVE AND OBJECTIVE BOX
DATE OF SERVICE: 11-30-21 @ 12:51    Subjective: Patient seen and examined. No new events except as noted.     SUBJECTIVE/ROS:  No chest pain, dyspnea, palpitation, or dizziness.       MEDICATIONS:  MEDICATIONS  (STANDING):  amLODIPine   Tablet 10 milliGRAM(s) Oral daily  apixaban 5 milliGRAM(s) Oral two times a day  aspirin enteric coated 81 milliGRAM(s) Oral daily  ATENolol  Tablet 75 milliGRAM(s) Oral daily  atorvastatin 20 milliGRAM(s) Oral at bedtime  cloNIDine 0.1 milliGRAM(s) Oral two times a day  influenza   Vaccine 0.5 milliLiter(s) IntraMuscular once  montelukast 10 milliGRAM(s) Oral daily  sodium chloride 0.9% lock flush 3 milliLiter(s) IV Push every 8 hours      PHYSICAL EXAM:  T(C): 36.8 (11-30-21 @ 04:50), Max: 36.8 (11-29-21 @ 19:12)  HR: 78 (11-30-21 @ 04:50) (71 - 89)  BP: 122/79 (11-30-21 @ 04:50) (120/76 - 128/74)  RR: 18 (11-30-21 @ 04:50) (18 - 18)  SpO2: 99% (11-30-21 @ 04:50) (98% - 99%)  Wt(kg): --  I&O's Summary    29 Nov 2021 07:01  -  30 Nov 2021 07:00  --------------------------------------------------------  IN: 900 mL / OUT: 650 mL / NET: 250 mL    30 Nov 2021 07:01  -  30 Nov 2021 12:51  --------------------------------------------------------  IN: 120 mL / OUT: 200 mL / NET: -80 mL            JVP: Normal  Neck: supple  Lung: clear   CV: S1 S2 , Murmur:  Abd: soft  Ext: No edema  neuro: Awake / alert  Psych: flat affect  Skin: normal``    LABS/DATA:    CARDIAC MARKERS:  CARDIAC MARKERS ( 27 Nov 2021 19:30 )  x     / x     / 66 U/L / x     / 1.0 ng/mL                                10.4   6.07  )-----------( 243      ( 30 Nov 2021 07:42 )             34.1     11-30    135  |  100  |  19  ----------------------------<  94  4.3   |  25  |  1.04    Ca    9.4      30 Nov 2021 07:42      proBNP:   Lipid Profile:   HgA1c:   TSH:     TELE:  EKG:

## 2021-11-30 NOTE — PROGRESS NOTE ADULT - SUBJECTIVE AND OBJECTIVE BOX
Subjective: "Good morning"  OOB chair    Tele:    SR 70s                            T(C): 36.8 (11-30-21 @ 04:50), Max: 36.8 (11-29-21 @ 19:12)  HR: 78 (11-30-21 @ 04:50) (71 - 89)  BP: 122/79 (11-30-21 @ 04:50) (120/76 - 128/74)  RR: 18 (11-30-21 @ 04:50) (18 - 18)  SpO2: 99% (11-30-21 @ 04:50) (98% - 99%)    LVEF:       11-30    135  |  100  |  19  ----------------------------<  94  4.3   |  25  |  1.04    Ca    9.4      30 Nov 2021 07:42                                 10.4   6.07  )-----------( 243      ( 30 Nov 2021 07:42 )             34.1             Assessment  Neurology: alert and oriented x 3lt arm eakness   LE  BL weankess  CV :  RRR  Lungs:   CTA B/L  Abdomen: soft, nontender, nondistended, positive bowel sounds,  Extremities:     no edema  no calf tenderness          MEDICATIONS  (STANDING):  amLODIPine   Tablet 10 milliGRAM(s) Oral daily  apixaban 2.5 milliGRAM(s) Oral two times a day  aspirin enteric coated 81 milliGRAM(s) Oral daily  ATENolol  Tablet 75 milliGRAM(s) Oral daily  atorvastatin 20 milliGRAM(s) Oral at bedtime  cloNIDine 0.1 milliGRAM(s) Oral two times a day  influenza   Vaccine 0.5 milliLiter(s) IntraMuscular once  montelukast 10 milliGRAM(s) Oral daily  sodium chloride 0.9% lock flush 3 milliLiter(s) IV Push every 8 hours       PAST MEDICAL & SURGICAL HISTORY:  HTN (hypertension)    CVA (cerebrovascular accident)    History of tracheostomy

## 2021-12-01 LAB
ANION GAP SERPL CALC-SCNC: 11 MMOL/L — SIGNIFICANT CHANGE UP (ref 5–17)
BUN SERPL-MCNC: 20 MG/DL — SIGNIFICANT CHANGE UP (ref 7–23)
CALCIUM SERPL-MCNC: 9.4 MG/DL — SIGNIFICANT CHANGE UP (ref 8.4–10.5)
CHLORIDE SERPL-SCNC: 102 MMOL/L — SIGNIFICANT CHANGE UP (ref 96–108)
CO2 SERPL-SCNC: 25 MMOL/L — SIGNIFICANT CHANGE UP (ref 22–31)
CREAT SERPL-MCNC: 1.14 MG/DL — SIGNIFICANT CHANGE UP (ref 0.5–1.3)
GLUCOSE SERPL-MCNC: 89 MG/DL — SIGNIFICANT CHANGE UP (ref 70–99)
HCT VFR BLD CALC: 33.4 % — LOW (ref 34.5–45)
HGB BLD-MCNC: 10.1 G/DL — LOW (ref 11.5–15.5)
MCHC RBC-ENTMCNC: 25.7 PG — LOW (ref 27–34)
MCHC RBC-ENTMCNC: 30.2 GM/DL — LOW (ref 32–36)
MCV RBC AUTO: 85 FL — SIGNIFICANT CHANGE UP (ref 80–100)
NRBC # BLD: 0 /100 WBCS — SIGNIFICANT CHANGE UP (ref 0–0)
PLATELET # BLD AUTO: 237 K/UL — SIGNIFICANT CHANGE UP (ref 150–400)
POTASSIUM SERPL-MCNC: 4.2 MMOL/L — SIGNIFICANT CHANGE UP (ref 3.5–5.3)
POTASSIUM SERPL-SCNC: 4.2 MMOL/L — SIGNIFICANT CHANGE UP (ref 3.5–5.3)
RBC # BLD: 3.93 M/UL — SIGNIFICANT CHANGE UP (ref 3.8–5.2)
RBC # FLD: 13.8 % — SIGNIFICANT CHANGE UP (ref 10.3–14.5)
SODIUM SERPL-SCNC: 138 MMOL/L — SIGNIFICANT CHANGE UP (ref 135–145)
WBC # BLD: 6.75 K/UL — SIGNIFICANT CHANGE UP (ref 3.8–10.5)
WBC # FLD AUTO: 6.75 K/UL — SIGNIFICANT CHANGE UP (ref 3.8–10.5)

## 2021-12-01 PROCEDURE — 70498 CT ANGIOGRAPHY NECK: CPT | Mod: 26

## 2021-12-01 PROCEDURE — 74174 CTA ABD&PLVS W/CONTRAST: CPT | Mod: 26

## 2021-12-01 PROCEDURE — 70551 MRI BRAIN STEM W/O DYE: CPT | Mod: 26

## 2021-12-01 PROCEDURE — 70544 MR ANGIOGRAPHY HEAD W/O DYE: CPT | Mod: 26,59

## 2021-12-01 RX ADMIN — Medication 0.1 MILLIGRAM(S): at 05:25

## 2021-12-01 RX ADMIN — APIXABAN 5 MILLIGRAM(S): 2.5 TABLET, FILM COATED ORAL at 05:25

## 2021-12-01 RX ADMIN — SODIUM CHLORIDE 3 MILLILITER(S): 9 INJECTION INTRAMUSCULAR; INTRAVENOUS; SUBCUTANEOUS at 21:26

## 2021-12-01 RX ADMIN — APIXABAN 5 MILLIGRAM(S): 2.5 TABLET, FILM COATED ORAL at 17:21

## 2021-12-01 RX ADMIN — Medication 0.1 MILLIGRAM(S): at 17:21

## 2021-12-01 RX ADMIN — SODIUM CHLORIDE 3 MILLILITER(S): 9 INJECTION INTRAMUSCULAR; INTRAVENOUS; SUBCUTANEOUS at 05:31

## 2021-12-01 RX ADMIN — ATENOLOL 75 MILLIGRAM(S): 25 TABLET ORAL at 05:26

## 2021-12-01 RX ADMIN — Medication 81 MILLIGRAM(S): at 11:35

## 2021-12-01 RX ADMIN — AMLODIPINE BESYLATE 10 MILLIGRAM(S): 2.5 TABLET ORAL at 05:26

## 2021-12-01 RX ADMIN — ATORVASTATIN CALCIUM 20 MILLIGRAM(S): 80 TABLET, FILM COATED ORAL at 22:17

## 2021-12-01 RX ADMIN — MONTELUKAST 10 MILLIGRAM(S): 4 TABLET, CHEWABLE ORAL at 11:35

## 2021-12-01 RX ADMIN — SODIUM CHLORIDE 3 MILLILITER(S): 9 INJECTION INTRAMUSCULAR; INTRAVENOUS; SUBCUTANEOUS at 13:07

## 2021-12-01 NOTE — PROGRESS NOTE ADULT - SUBJECTIVE AND OBJECTIVE BOX
DATE OF SERVICE: 12-01-21 @ 09:43    Subjective: Patient seen and examined. No new events except as noted.     SUBJECTIVE/ROS:  Pt seen and examined early this am  No chest pain, dyspnea, palpitation, or dizziness.       MEDICATIONS:  MEDICATIONS  (STANDING):  amLODIPine   Tablet 10 milliGRAM(s) Oral daily  apixaban 5 milliGRAM(s) Oral two times a day  aspirin enteric coated 81 milliGRAM(s) Oral daily  ATENolol  Tablet 75 milliGRAM(s) Oral daily  atorvastatin 20 milliGRAM(s) Oral at bedtime  cloNIDine 0.1 milliGRAM(s) Oral two times a day  influenza   Vaccine 0.5 milliLiter(s) IntraMuscular once  montelukast 10 milliGRAM(s) Oral daily  sodium chloride 0.9% lock flush 3 milliLiter(s) IV Push every 8 hours      PHYSICAL EXAM:  T(C): 37.1 (12-01-21 @ 05:04), Max: 37.1 (12-01-21 @ 05:04)  HR: 71 (12-01-21 @ 05:04) (71 - 83)  BP: 119/70 (12-01-21 @ 05:04) (114/74 - 122/76)  RR: 18 (12-01-21 @ 05:04) (18 - 18)  SpO2: 92% (12-01-21 @ 05:04) (92% - 99%)  Wt(kg): --  I&O's Summary    30 Nov 2021 07:01  -  01 Dec 2021 07:00  --------------------------------------------------------  IN: 540 mL / OUT: 850 mL / NET: -310 mL    01 Dec 2021 07:01  -  01 Dec 2021 09:43  --------------------------------------------------------  IN: 0 mL / OUT: 100 mL / NET: -100 mL            JVP: Normal  Neck: supple  Lung: clear   CV: S1 S2 , Murmur:  Abd: soft  Ext: No edema  neuro: Awake / alert  Psych: flat affect  Skin: normal``    LABS/DATA:    CARDIAC MARKERS:                                10.1   6.75  )-----------( 237      ( 01 Dec 2021 05:32 )             33.4     12-01    138  |  102  |  20  ----------------------------<  89  4.2   |  25  |  1.14    Ca    9.4      01 Dec 2021 05:32      proBNP:   Lipid Profile:   HgA1c:   TSH:     TELE:  EKG:

## 2021-12-01 NOTE — PROGRESS NOTE ADULT - ASSESSMENT
Large coronary aneurysm   cards eval noted   not surgical candidate as per CTS  not transplant candidate as per CHF    a/c and asa given large aneurysm   c/w eliquis   vascular cardiology consult    HTN  stable  c/w meds    CVA  on asa, a/c  statin    d/c planning if cleared by vasc cards/ cards/   after imaging

## 2021-12-01 NOTE — PROGRESS NOTE ADULT - ASSESSMENT
Large coronary aneurysm   not surgical candidate as per CTS  not transplant candidate as per CHF   agree with a/c and asa given large aneurysm   appreciate vascular cardiology input plan for imaging     HTN  stable    CVA  on asa, a/c  statin

## 2021-12-01 NOTE — PROGRESS NOTE ADULT - SUBJECTIVE AND OBJECTIVE BOX
DATE OF SERVICE: 12-01-21 @ 13:29  CHIEF COMPLAINT:Patient is a 41y old  Female who presents with a chief complaint of coronary aneurysm (01 Dec 2021 09:43)    	        PAST MEDICAL & SURGICAL HISTORY:  HTN (hypertension)    CVA (cerebrovascular accident)    History of tracheostomy            REVIEW OF SYSTEMS:  CONSTITUTIONAL: No fever, weight loss, or fatigue  EYES: No eye pain, visual disturbances, or discharge  NECK: No pain or stiffness  RESPIRATORY: No cough, wheezing, chills or hemoptysis; No Shortness of Breath  CARDIOVASCULAR: No chest pain, palpitations, passing out, dizziness, or leg swelling  GASTROINTESTINAL: No abdominal or epigastric pain. No nausea, vomiting, or hematemesis; No diarrhea or constipation. No melena or hematochezia.  GENITOURINARY: No dysuria, frequency, hematuria, or incontinence  NEUROLOGICAL: No headaches,  Medications:  MEDICATIONS  (STANDING):  amLODIPine   Tablet 10 milliGRAM(s) Oral daily  apixaban 5 milliGRAM(s) Oral two times a day  aspirin enteric coated 81 milliGRAM(s) Oral daily  ATENolol  Tablet 75 milliGRAM(s) Oral daily  atorvastatin 20 milliGRAM(s) Oral at bedtime  cloNIDine 0.1 milliGRAM(s) Oral two times a day  influenza   Vaccine 0.5 milliLiter(s) IntraMuscular once  montelukast 10 milliGRAM(s) Oral daily  sodium chloride 0.9% lock flush 3 milliLiter(s) IV Push every 8 hours    MEDICATIONS  (PRN):    	    PHYSICAL EXAM:  T(C): 36.6 (12-01-21 @ 12:26), Max: 37.1 (12-01-21 @ 05:04)  HR: 84 (12-01-21 @ 12:26) (71 - 84)  BP: 119/85 (12-01-21 @ 12:26) (114/74 - 122/76)  RR: 18 (12-01-21 @ 12:26) (18 - 18)  SpO2: 99% (12-01-21 @ 12:26) (92% - 99%)  Wt(kg): --  I&O's Summary    30 Nov 2021 07:01  -  01 Dec 2021 07:00  --------------------------------------------------------  IN: 540 mL / OUT: 850 mL / NET: -310 mL    01 Dec 2021 07:01  -  01 Dec 2021 13:29  --------------------------------------------------------  IN: 440 mL / OUT: 100 mL / NET: 340 mL        Appearance: Normal	  HEENT:   Normal oral mucosa, PERRL, EOMI	  Lymphatic: No lymphadenopathy  Cardiovascular: Normal S1 S2, No JVD, No murmurs, No edema  Respiratory: Lungs clear to auscultation	  Psychiatry: A & O   Gastrointestinal:  Soft, Non-tender, + BS	  Skin: No rashes, No ecchymoses, No cyanosis	  Neurologic: Non-focal  Extremities: Normal range of motion, No clubbing, cyanosis or edema  Vascular: Peripheral pulses palpable 2+ bilaterally    TELEMETRY: 	    ECG:  	  RADIOLOGY:  OTHER: 	  	  LABS:	 	    CARDIAC MARKERS:                                10.1   6.75  )-----------( 237      ( 01 Dec 2021 05:32 )             33.4     12-01    138  |  102  |  20  ----------------------------<  89  4.2   |  25  |  1.14    Ca    9.4      01 Dec 2021 05:32      proBNP:   Lipid Profile:   HgA1c:   TSH:

## 2021-12-02 ENCOUNTER — TRANSCRIPTION ENCOUNTER (OUTPATIENT)
Age: 41
End: 2021-12-02

## 2021-12-02 LAB — SARS-COV-2 RNA SPEC QL NAA+PROBE: SIGNIFICANT CHANGE UP

## 2021-12-02 PROCEDURE — 93925 LOWER EXTREMITY STUDY: CPT | Mod: 26

## 2021-12-02 RX ADMIN — ATORVASTATIN CALCIUM 20 MILLIGRAM(S): 80 TABLET, FILM COATED ORAL at 21:12

## 2021-12-02 RX ADMIN — ATENOLOL 75 MILLIGRAM(S): 25 TABLET ORAL at 05:27

## 2021-12-02 RX ADMIN — SODIUM CHLORIDE 3 MILLILITER(S): 9 INJECTION INTRAMUSCULAR; INTRAVENOUS; SUBCUTANEOUS at 21:31

## 2021-12-02 RX ADMIN — AMLODIPINE BESYLATE 10 MILLIGRAM(S): 2.5 TABLET ORAL at 05:26

## 2021-12-02 RX ADMIN — APIXABAN 5 MILLIGRAM(S): 2.5 TABLET, FILM COATED ORAL at 17:20

## 2021-12-02 RX ADMIN — SODIUM CHLORIDE 3 MILLILITER(S): 9 INJECTION INTRAMUSCULAR; INTRAVENOUS; SUBCUTANEOUS at 11:53

## 2021-12-02 RX ADMIN — MONTELUKAST 10 MILLIGRAM(S): 4 TABLET, CHEWABLE ORAL at 11:52

## 2021-12-02 RX ADMIN — Medication 81 MILLIGRAM(S): at 11:52

## 2021-12-02 RX ADMIN — Medication 0.1 MILLIGRAM(S): at 17:20

## 2021-12-02 RX ADMIN — SODIUM CHLORIDE 3 MILLILITER(S): 9 INJECTION INTRAMUSCULAR; INTRAVENOUS; SUBCUTANEOUS at 05:23

## 2021-12-02 RX ADMIN — APIXABAN 5 MILLIGRAM(S): 2.5 TABLET, FILM COATED ORAL at 05:26

## 2021-12-02 RX ADMIN — Medication 0.1 MILLIGRAM(S): at 05:26

## 2021-12-02 NOTE — PROGRESS NOTE ADULT - ASSESSMENT
Large coronary aneurysm   cards eval noted   not surgical candidate as per CTS  not transplant candidate as per CHF    a/c and asa given large aneurysm   c/w eliquis   vascular cardiology consult noted  neuro/ neurosx consults noted  no sx   d/c w/ outpt f/u    HTN  stable  c/w meds    CVA  on asa, a/c  statin         Large coronary aneurysm   cards eval noted   not surgical candidate as per CTS  not transplant candidate as per CHF    a/c and asa given large aneurysm   c/w eliquis   vascular cardiology consult noted  neuro/ neurosx consults noted  no sx   d/c w/ outpt f/u    HTN  stable  c/w meds    CVA  on asa, a/c  statin    spoke w/ hcp  / ms lyman today in detail and explained need for close f/u w/ neuro/ cards/ pmd/sx

## 2021-12-02 NOTE — DISCHARGE NOTE PROVIDER - HOSPITAL COURSE
42 y/o female with PMHx of HTN, CVAx2 (2015) s/p trach and PEG with residual right sided weakness who presented to [facility] ED for abnormal outpatient Cardiac CTA results showing coronary aneurysm. CTA: LMCA, originates from the left coronary sinus of Valsalva and thereafter appears to supply a large aneurysmal dilatation located anterior and superior to LV. Calcification and possible thrombus visualized within the wall of this dilated aneurysmal portion which appears to compress the left atrium. RCA: dominant, demonstrates dilatation in the proximal portions of the vessel but appears patent. Patient had recent admission for chest pain, MI ruled out. Patient now transferred to [facility] for CT Surgery evaluation for possible coronary aneurysm repair. Seen by CT Sx, s/p cardiac cath to evaluate coronaries found to have multiple coronary aneurysms - Large aneurysm noted in LM, LAD, Cx, RCA.  Not likely a surgical candidate per CT Sx. Seen by HF and transplant team.  Advanced heart failure was consulted about transplant candidacy and she has been deemed not a candidate due to poor functional status with both objective/subjective frailty which would not improve with transplantation. She does not have systolic dysfunction or signs of congestion so HF signed off. CTS evaluated the patient and determined she is not a candidate for CABG. Pt also seen by vascular cards for diffuse coronary aneurysm, they recommends : MRA head without contrast, CTA of neck, abdomen and pelvis, and lower extremity arterial duplex. MRI/A head shows basis pontis chronic hemorrhage versus cavernoma with pontine and cerebellar atrophy. Chronic right basal ganglia lacunar infarct. No evidence for vascular aneurysm. Decreased flow within the left middle cerebral artery without definite focal stenosis, may be artifactual in nature. CTA would be valuable for further evaluation. Given this finding, neurology and neurosx consulted. Neuro and Nsx recs no acute intervention but o/p follow up to repeat imaging for stability check. Vascular cardiology cleared pt given no aneurysms on CT Neck, A/P. Pt is medically stable to DC w/ close outpatient follow up.

## 2021-12-02 NOTE — PROGRESS NOTE ADULT - SUBJECTIVE AND OBJECTIVE BOX
DATE OF SERVICE: 12-02-21 @ 12:05  CHIEF COMPLAINT:Patient is a 41y old  Female who presents with a chief complaint of coronary aneurysm (02 Dec 2021 11:21)    	        PAST MEDICAL & SURGICAL HISTORY:  HTN (hypertension)    CVA (cerebrovascular accident)    History of tracheostomy              NECK: No pain or stiffness  RESPIRATORY: No cough, wheezing, chills or hemoptysis; No Shortness of Breath  CARDIOVASCULAR: No chest pain, palpitations, passing out, dizziness, or leg swelling  GASTROINTESTINAL: No abdominal or epigastric pain. No nausea, vomiting, or hematemesis; No diarrhea or constipation. No melena or hematochezia.  GENITOURINARY: No dysuria, frequency, hematuria, or incontinence  NEUROLOGICAL: No headaches  Medications:  MEDICATIONS  (STANDING):  amLODIPine   Tablet 10 milliGRAM(s) Oral daily  apixaban 5 milliGRAM(s) Oral two times a day  aspirin enteric coated 81 milliGRAM(s) Oral daily  ATENolol  Tablet 75 milliGRAM(s) Oral daily  atorvastatin 20 milliGRAM(s) Oral at bedtime  cloNIDine 0.1 milliGRAM(s) Oral two times a day  influenza   Vaccine 0.5 milliLiter(s) IntraMuscular once  montelukast 10 milliGRAM(s) Oral daily  sodium chloride 0.9% lock flush 3 milliLiter(s) IV Push every 8 hours    MEDICATIONS  (PRN):    	    PHYSICAL EXAM:  T(C): 36.6 (12-02-21 @ 05:05), Max: 36.7 (12-01-21 @ 21:34)  HR: 69 (12-02-21 @ 05:05) (69 - 85)  BP: 120/77 (12-02-21 @ 05:05) (109/77 - 140/80)  RR: 18 (12-02-21 @ 05:05) (18 - 18)  SpO2: 100% (12-02-21 @ 05:05) (99% - 100%)  Wt(kg): --  I&O's Summary    01 Dec 2021 07:01  -  02 Dec 2021 07:00  --------------------------------------------------------  IN: 560 mL / OUT: 300 mL / NET: 260 mL    02 Dec 2021 07:01  -  02 Dec 2021 12:05  --------------------------------------------------------  IN: 120 mL / OUT: 0 mL / NET: 120 mL        Appearance: Normal	  HEENT:   Normal oral mucosa, PERRL, EOMI	  Lymphatic: No lymphadenopathy  Cardiovascular: Normal S1 S2, No JVD, No murmurs, No edema  Respiratory: Lungs clear to auscultation	  Psychiatry: A & O   Gastrointestinal:  Soft, Non-tender, + BS	  Skin: No rashes, No ecchymoses, No cyanosis	  Neurologic: Non-focal  Extremities: Normal range of motion, No clubbing, cyanosis or edema  Vascular: Peripheral pulses palpable 2+ bilaterally    TELEMETRY: 	    ECG:  	  RADIOLOGY:  OTHER: 	  	  LABS:	 	    CARDIAC MARKERS:                                10.1   6.75  )-----------( 237      ( 01 Dec 2021 05:32 )             33.4     12-01    138  |  102  |  20  ----------------------------<  89  4.2   |  25  |  1.14    Ca    9.4      01 Dec 2021 05:32      proBNP:   Lipid Profile:   HgA1c:   TSH:

## 2021-12-02 NOTE — DISCHARGE NOTE PROVIDER - NSDCCPCAREPLAN_GEN_ALL_CORE_FT
PRINCIPAL DISCHARGE DIAGNOSIS  Diagnosis: Coronary aneurysm  Assessment and Plan of Treatment: - Large diffuse coronary aneurysm noted in LM, LAD, Cx, RCA    - Not likely a surgical candidate   - Seen by HF and transplant team  - Not a candidate for transplantation  -  She does not have systolic dysfunction or signs of congestion  - Seen by vascular cardiology and they recommendMRA head without contrast, CTA of neck, abdomen and pelvis, and lower extremity arterial duplex  - MRI/A head shows basis pontis chronic hemorrhage versus cavernoma with pontine and cerebellar atrophy. Chronic right basal ganglia lacunar infarct. No evidence for vascular aneurysm.   - Seen by neurology and neurosurgery  - Neuro and Neurosurgery recommends outpatient follow up   - Can get fu MRI brain in 6mo as outpt to determine stability, given possible underlying cavernoma  - No inpatient neurological workup as patient issues are chronic  - Continue aspirin and eliquis        SECONDARY DISCHARGE DIAGNOSES  Diagnosis: HTN (hypertension)  Assessment and Plan of Treatment:

## 2021-12-02 NOTE — CONSULT NOTE ADULT - ASSESSMENT
This is a 41y year old Female with the below past medical history who presents with the chief complaint of chest pain.  PMHx of HTN, CVAx2 (2015) s/p trach and PEG with residual right sided weakness and who presented to [facility] ED for abnormal outpatient Cardiac CTA results showing coronary aneurysm. CTA: LMCA, originates from the left coronary sinus of Valsalva and thereafter appears to supply a large aneurysmal dilatation located anterior and superior to LV. Calcification and possible thrombus visualized within the wall of this dilated aneurysmal portion which appears to compress the left atrium. RCA: dominant, demonstrates dilatation in the proximal portions of the vessel but appears patent. Patient had recent admission for chest pain, MI ruled out.     Pt states had stroke, last approx 6 years ago, left with significant deficits inc affecting her vision, left side of her body and ability to walk.  These sx have remained stable and have not changed as of late.  She is under the care of neurology at [place].     exam notable for spontansoud movement of the eyes, left gaze deviation, left sided weakness and spasticity as well as ataxia  acc to the pt, this is her baseline since her stroke  MRI with poss cav angioma, appreciate NS note, no intervention  concur with secondary stroke prevention measures  no aneursym on mra  no role for further neuroinpt workup as her issues are chronic  she can follow with her neurologist at [place], who can decide if follow up imaging is warranted as likely this is on her old imaging that they have    d/w pt

## 2021-12-02 NOTE — CONSULT NOTE ADULT - SUBJECTIVE AND OBJECTIVE BOX
Admitting Diagnosis:  Chest pain [R07.9]  CHEST PAIN, UNSPECIFIED        HPI:  This is a 41y year old Female with the below past medical history who presents with the chief complaint of chest pain.  PMHx of HTN, CVAx2 (2015) s/p trach and PEG with residual right sided weakness and who presented to [facility] ED for abnormal outpatient Cardiac CTA results showing coronary aneurysm. CTA: LMCA, originates from the left coronary sinus of Valsalva and thereafter appears to supply a large aneurysmal dilatation located anterior and superior to LV. Calcification and possible thrombus visualized within the wall of this dilated aneurysmal portion which appears to compress the left atrium. RCA: dominant, demonstrates dilatation in the proximal portions of the vessel but appears patent. Patient had recent admission for chest pain, MI ruled out.     Pt states had stroke, last approx 6 years ago, left with significant deficits inc affecting her vision, left side of her body and ability to walk.  These sx have remained stable and have not changed as of late.  She is under the care of neurology at [place].     Past Medical History:  HTN (hypertension) [I10]    CVA (cerebrovascular accident) [I63.9]        Past Surgical History:  History of tracheostomy [Z98.890]        Social History:  No toxic habits    Family History:  FAMILY HISTORY:      Allergies:  No Known Allergies      ROS:  Constitutional: Patient offers no complaints of fevers or significant weight loss  Ears, Nose, Mouth and Throat: The patient presents with no abnormalities of the head, ears, eyes, nose or throat  Skin: Patient offers no concerns of new rashes or lesions  Respiratory: The patient presents with no abnormalities of the respiratory tract  Cardiovascular: The patient presents with no cardiac abnormalities  Gastrointestinal: The patient presents with no abnormalities of the GI system  Genitourinary: The patient presents with no dysuria, hematuria or frequent urination  Neurological: See HPI  Endocrine: Patient offers no complaints of excessive thirst, urination, or heat/cold intolerance    Advanced care planning reviewed and noted in the chart.    Medications:  amLODIPine   Tablet 10 milliGRAM(s) Oral daily  apixaban 5 milliGRAM(s) Oral two times a day  aspirin enteric coated 81 milliGRAM(s) Oral daily  ATENolol  Tablet 75 milliGRAM(s) Oral daily  atorvastatin 20 milliGRAM(s) Oral at bedtime  cloNIDine 0.1 milliGRAM(s) Oral two times a day  influenza   Vaccine 0.5 milliLiter(s) IntraMuscular once  montelukast 10 milliGRAM(s) Oral daily  sodium chloride 0.9% lock flush 3 milliLiter(s) IV Push every 8 hours      Labs:  CBC Full  -  ( 01 Dec 2021 05:32 )  WBC Count : 6.75 K/uL  RBC Count : 3.93 M/uL  Hemoglobin : 10.1 g/dL  Hematocrit : 33.4 %  Platelet Count - Automated : 237 K/uL  Mean Cell Volume : 85.0 fl  Mean Cell Hemoglobin : 25.7 pg  Mean Cell Hemoglobin Concentration : 30.2 gm/dL  Auto Neutrophil # : x  Auto Lymphocyte # : x  Auto Monocyte # : x  Auto Eosinophil # : x  Auto Basophil # : x  Auto Neutrophil % : x  Auto Lymphocyte % : x  Auto Monocyte % : x  Auto Eosinophil % : x  Auto Basophil % : x    12-01    138  |  102  |  20  ----------------------------<  89  4.2   |  25  |  1.14    Ca    9.4      01 Dec 2021 05:32      CAPILLARY BLOOD GLUCOSE              Female    Vitals:  Vital Signs Last 24 Hrs  T(C): 36.6 (02 Dec 2021 05:05), Max: 36.7 (01 Dec 2021 21:34)  T(F): 97.8 (02 Dec 2021 05:05), Max: 98 (01 Dec 2021 21:34)  HR: 69 (02 Dec 2021 05:05) (69 - 85)  BP: 120/77 (02 Dec 2021 05:05) (109/77 - 140/80)  BP(mean): 91 (02 Dec 2021 05:05) (91 - 91)  RR: 18 (02 Dec 2021 05:05) (18 - 18)  SpO2: 100% (02 Dec 2021 05:05) (99% - 100%)    NEUROLOGICAL EXAM:    Mental status: Awake, alert, and in no apparent distress. Oriented to person, place and time. Language function is normal. Recent memory, digit span and concentration were normal.     Cranial Nerves: Pupils were equal, round, reactive to light. Extraocular movements - spontaneous nystagmus, slow in all directions, worse in left eye, cannot look to the right.  Visual field were full. Fundoscopic exam was deferred. Facial sensation was intact to light touch. left  facial asymmetry. The palate was upgoing symmetrically and tongue was midline. Hearing acuity was intact to finger rub AU. Shoulder shrug was full bilaterally    Motor exam: Bulk and tone were normal. left arm and leg 4+/5, inc tone and spastickty, dec FFM    Reflexes: inc on the left left toe upgoing    Sensation: Intact to light touch, temperature, vibration and proprioception.     Coordination: Finger-nose-finger heel-to-shin on left is dysmetric    Gait: wheelchair bound    NIHSS - 15    < from: MR Angio Head No Cont (12.01.21 @ 09:49) >    EXAM:  MR ANGIO BRAIN                          EXAM:  MR BRAIN                            PROCEDURE DATE:  12/01/2021            INTERPRETATION:  Noncontrast MRI of the brain and MRA of the [place]    CLINICAL INDICATION: Presence of coronary artery aneurysms, evaluate for intracranial aneurysm    TECHNIQUE: Sagittal T1 FLAIR, axial spoiled gradient, gradient echo, T2, and T2 FLAIR, as well as diffusion-weighted MR images of the brain, and 3-D time-of-flight images of the [place] were obtained without the intravenous administration of contrast.  An ADC map was generated.  3-D time-of-flight images were reformatted using MIP protocol targeted over the head.    COMPARISON: None available    FINDINGS:    Brain MRI:    No acute hemorrhage or infarct is identified.. There is a chronic right lentiform nucleus lacunar infarct.    Located at the basis pontis is abnormal signal intensity with intermediate signal on T1 and increased on T2-weighted images with a surrounding hypointense ring. There is susceptibility on the gradient echo images.. This is consistent with chronic hemorrhage versus cavernoma.    There is mild atrophy of the parveen and cerebellum with enlargement of the fourth ventricle..    No midline shift, hydrocephalus or extra-axial collections are present.    Major flow voids at the skull base are within normal limits.    The orbits are not remarkable in appearance.    The visualized paranasal sinuses and tympanomastoid cavities are free of acute disease.    Brain MRA:    There is no evidence for vascular aneurysm. There is decreased flow noted along the course of the left middle cerebral artery without definite narrowing. The remaining vessels are not remarkable in appearance. The anterior communicating artery is normal. There is fetal origin of the left posterior cerebral artery with left P1 segment hypoplasia.    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  Chronic right basal ganglia lacunar infarct.    Brain MRA: No evidence for vascular aneurysm. Decreased flow within the left middle cerebral artery without definite focal stenosis, may be artifactual in nature. CTA would be valuable for further evaluation.        --- End of Report ---                FROILAN NEWSOME MD; Attending Radiologist  This document has been electronically signed. Dec  1 2021 10:06AM    < end of copied text >

## 2021-12-02 NOTE — CONSULT NOTE ADULT - CONSULT REASON
Three vessel coronary aneurysms
transplant evaluation for coronary aneurysm
hx of stroke
med
pontine heme
cardiac eval

## 2021-12-02 NOTE — PROGRESS NOTE ADULT - SUBJECTIVE AND OBJECTIVE BOX
DATE OF SERVICE: 12-02-21 @ 06:45    Subjective: Patient seen and examined. No new events except as noted.     SUBJECTIVE/ROS:    No chest pain, dyspnea, palpitation, or dizziness.     MEDICATIONS:  MEDICATIONS  (STANDING):  amLODIPine   Tablet 10 milliGRAM(s) Oral daily  apixaban 5 milliGRAM(s) Oral two times a day  aspirin enteric coated 81 milliGRAM(s) Oral daily  ATENolol  Tablet 75 milliGRAM(s) Oral daily  atorvastatin 20 milliGRAM(s) Oral at bedtime  cloNIDine 0.1 milliGRAM(s) Oral two times a day  influenza   Vaccine 0.5 milliLiter(s) IntraMuscular once  montelukast 10 milliGRAM(s) Oral daily  sodium chloride 0.9% lock flush 3 milliLiter(s) IV Push every 8 hours      PHYSICAL EXAM:  T(C): 36.6 (12-02-21 @ 05:05), Max: 36.7 (12-01-21 @ 21:34)  HR: 69 (12-02-21 @ 05:05) (69 - 85)  BP: 120/77 (12-02-21 @ 05:05) (109/77 - 140/80)  RR: 18 (12-02-21 @ 05:05) (18 - 18)  SpO2: 100% (12-02-21 @ 05:05) (99% - 100%)  Wt(kg): --  I&O's Summary    30 Nov 2021 07:01  -  01 Dec 2021 07:00  --------------------------------------------------------  IN: 540 mL / OUT: 850 mL / NET: -310 mL    01 Dec 2021 07:01  -  02 Dec 2021 06:45  --------------------------------------------------------  IN: 560 mL / OUT: 300 mL / NET: 260 mL            JVP: Normal  Neck: supple  Lung: clear   CV: S1 S2 , Murmur:  Abd: soft  Ext: No edema  neuro: Awake / alert  Psych: flat affect  Skin: normal``    LABS/DATA:    CARDIAC MARKERS:                                10.1   6.75  )-----------( 237      ( 01 Dec 2021 05:32 )             33.4     12-01    138  |  102  |  20  ----------------------------<  89  4.2   |  25  |  1.14    Ca    9.4      01 Dec 2021 05:32      proBNP:   Lipid Profile:   HgA1c:   TSH:     TELE:  EKG:

## 2021-12-02 NOTE — CONSULT NOTE ADULT - SUBJECTIVE AND OBJECTIVE BOX
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 No new neuro deficits or HA. MRI/MRA brain obtained for aneurysm screening, chronic heme of post inf parveen and adj peduncles, can't r/o cavernoma, also chronic R bg infarct. Exam: AOx3, +dysarthria, L pupil 4R, R pupil 3R, R lat gaze palsy, +rotary nystagmus most pronounced on L gaze, +L ptosis, no lower facial, tongue/palate midline, LUE 4/5 w/ drift, otherwise ADELSO strong, dec sens on L, +b/l ataxia L>R.     --Anticoagulation:  apixaban 5 milliGRAM(s) Oral two times a day  aspirin enteric coated 81 milliGRAM(s) Oral daily    =====================  PAST MEDICAL HISTORY   HTN (hypertension)    CVA (cerebrovascular accident)      PAST SURGICAL HISTORY   History of tracheostomy          MEDICATIONS:  Antibiotics:    Neuro:    Other:  amLODIPine   Tablet 10 milliGRAM(s) Oral daily  ATENolol  Tablet 75 milliGRAM(s) Oral daily  atorvastatin 20 milliGRAM(s) Oral at bedtime  cloNIDine 0.1 milliGRAM(s) Oral two times a day  influenza   Vaccine 0.5 milliLiter(s) IntraMuscular once  montelukast 10 milliGRAM(s) Oral daily  sodium chloride 0.9% lock flush 3 milliLiter(s) IV Push every 8 hours      SOCIAL HISTORY:   Occupation:   Marital Status:     FAMILY HISTORY:      ROS: Negative except per HPI    LABS:                          10.1   6.75  )-----------( 237      ( 01 Dec 2021 05:32 )             33.4     12-01    138  |  102  |  20  ----------------------------<  89  4.2   |  25  |  1.14    Ca    9.4      01 Dec 2021 05:32

## 2021-12-02 NOTE — CONSULT NOTE ADULT - CONSULT REQUESTED DATE/TIME
29-Nov-2021 12:32
02-Dec-2021 09:39
29-Nov-2021 18:26
02-Dec-2021 00:20
28-Nov-2021 12:06
30-Nov-2021 15:04

## 2021-12-02 NOTE — DISCHARGE NOTE PROVIDER - NSDCMRMEDTOKEN_GEN_ALL_CORE_FT
amLODIPine 10 mg oral tablet: 1 tab(s) orally once a day  aspirin 81 mg oral delayed release tablet: 1 tab(s) orally once a day  atenolol 50 mg oral tablet: 1 tab(s) orally once a day  cloNIDine 0.1 mg oral tablet: 1 tab(s) orally 2 times a day  montelukast 10 mg oral tablet: 1 tab(s) orally once a day  PT: Dx History of CVA  ICD 10 Z86. 73   amLODIPine 10 mg oral tablet: 1 tab(s) orally once a day  apixaban 5 mg oral tablet: 1 tab(s) orally 2 times a day  aspirin 81 mg oral delayed release tablet: 1 tab(s) orally once a day  atenolol 50 mg oral tablet: 1 tab(s) orally once a day  atorvastatin 20 mg oral tablet: 1 tab(s) orally once a day (at bedtime)  cloNIDine 0.1 mg oral tablet: 1 tab(s) orally 2 times a day  montelukast 10 mg oral tablet: 1 tab(s) orally once a day

## 2021-12-02 NOTE — CONSULT NOTE ADULT - ASSESSMENT
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 No new neuro deficits or HA. MRI/MRA brain obtained for aneurysm screening, chronic heme of post inf parveen and adj peduncles, can't r/o cavernoma, also chronic R bg infarct. Exam: AOx3, +dysarthria, L pupil 4R, R pupil 3R, R lat gaze palsy, +rotary nystagmus most pronounced on L gaze, +L ptosis, no lower facial, tongue/palate midline, LUE 4/5 w/ drift, otherwise DARDEN strong, dec sens on L, +b/l ataxia L>R.   - no acute neurosurgical intervention  - Per CT surg and Cards, not a surgical candidate for cardiac aneurysm repair and cannot come off AC/AP or undergo any major surgical intervention.   - Can get fu MRI brain in 6mo as outpt to determine stability, given possible underlying cavernoma, discussed risks/benefits of anticoagulation and ASA with patient, at this time it appears benefits outweigh the risks given cardiac hx, however patient understands risk of further intracranial hemorrhage.   - care per cardiology/stroke neurology  - outpt FU w/ Dr. Villanueva

## 2021-12-02 NOTE — CONSULT NOTE ADULT - REASON FOR ADMISSION
coronary aneurysm

## 2021-12-02 NOTE — PROGRESS NOTE ADULT - ASSESSMENT
Large coronary aneurysm   not surgical candidate as per CTS  not transplant candidate as per CHF   cont a/c and asa given large aneurysm   appreciate vascular cardiology input     HTN  stable    CVA  on asa, a/c  statin

## 2021-12-02 NOTE — DISCHARGE NOTE PROVIDER - CARE PROVIDER_API CALL
Remy Villanueva)  Neurosurgery  General  [address] [place] [address], [address]  [place] [address]  Phone: [phone]  Fax: [phone]  Follow Up Time: 2 weeks

## 2021-12-03 ENCOUNTER — TRANSCRIPTION ENCOUNTER (OUTPATIENT)
Age: 41
End: 2021-12-03

## 2021-12-03 VITALS
RESPIRATION RATE: 18 BRPM | HEART RATE: 103 BPM | DIASTOLIC BLOOD PRESSURE: 74 MMHG | OXYGEN SATURATION: 96 % | TEMPERATURE: 98 F | SYSTOLIC BLOOD PRESSURE: 116 MMHG

## 2021-12-03 RX ORDER — APIXABAN 2.5 MG/1
1 TABLET, FILM COATED ORAL
Qty: 60 | Refills: 0
Start: 2021-12-03 | End: 2022-01-01

## 2021-12-03 RX ORDER — ATORVASTATIN CALCIUM 80 MG/1
1 TABLET, FILM COATED ORAL
Qty: 30 | Refills: 0
Start: 2021-12-03 | End: 2022-01-01

## 2021-12-03 RX ADMIN — APIXABAN 5 MILLIGRAM(S): 2.5 TABLET, FILM COATED ORAL at 05:53

## 2021-12-03 RX ADMIN — ATENOLOL 75 MILLIGRAM(S): 25 TABLET ORAL at 05:53

## 2021-12-03 RX ADMIN — SODIUM CHLORIDE 3 MILLILITER(S): 9 INJECTION INTRAMUSCULAR; INTRAVENOUS; SUBCUTANEOUS at 05:51

## 2021-12-03 RX ADMIN — MONTELUKAST 10 MILLIGRAM(S): 4 TABLET, CHEWABLE ORAL at 11:00

## 2021-12-03 RX ADMIN — Medication 81 MILLIGRAM(S): at 11:00

## 2021-12-03 RX ADMIN — Medication 0.1 MILLIGRAM(S): at 05:53

## 2021-12-03 RX ADMIN — AMLODIPINE BESYLATE 10 MILLIGRAM(S): 2.5 TABLET ORAL at 05:52

## 2021-12-03 NOTE — PROGRESS NOTE ADULT - ASSESSMENT
Large coronary aneurysm   cards eval noted   not surgical candidate as per CTS  not transplant candidate as per CHF    a/c and asa given large aneurysm   c/w eliquis   vascular cardiology consult noted  neuro/ neurosx consults noted  no sx   d/c w/ outpt f/u    HTN  stable  c/w meds    CVA  on asa, a/c  statin    spoke w/ hcp  / ms lyman today in detail and explained need for close f/u w/ neuro/ cards/ pmd/sx

## 2021-12-03 NOTE — PROGRESS NOTE ADULT - SUBJECTIVE AND OBJECTIVE BOX
DATE OF SERVICE: 12-03-21 @ 11:58    Subjective: Patient seen and examined. No new events except as noted.     SUBJECTIVE/ROS:        MEDICATIONS:  MEDICATIONS  (STANDING):  amLODIPine   Tablet 10 milliGRAM(s) Oral daily  apixaban 5 milliGRAM(s) Oral two times a day  aspirin enteric coated 81 milliGRAM(s) Oral daily  ATENolol  Tablet 75 milliGRAM(s) Oral daily  atorvastatin 20 milliGRAM(s) Oral at bedtime  cloNIDine 0.1 milliGRAM(s) Oral two times a day  influenza   Vaccine 0.5 milliLiter(s) IntraMuscular once  montelukast 10 milliGRAM(s) Oral daily  sodium chloride 0.9% lock flush 3 milliLiter(s) IV Push every 8 hours      PHYSICAL EXAM:  T(C): 36.6 (12-03-21 @ 04:56), Max: 37 (12-02-21 @ 14:57)  HR: 103 (12-03-21 @ 04:56) (77 - 103)  BP: 116/74 (12-03-21 @ 04:56) (116/74 - 124/81)  RR: 18 (12-03-21 @ 04:56) (18 - 18)  SpO2: 96% (12-03-21 @ 04:56) (96% - 98%)  Wt(kg): --  I&O's Summary    02 Dec 2021 07:01  -  03 Dec 2021 07:00  --------------------------------------------------------  IN: 360 mL / OUT: 0 mL / NET: 360 mL            JVP: Normal  Neck: supple  Lung: clear   CV: S1 S2 , Murmur:  Abd: soft  Ext: No edema  neuro: Awake / alert  Psych: flat affect  Skin: normal``    LABS/DATA:    CARDIAC MARKERS:                  proBNP:   Lipid Profile:   HgA1c:   TSH:     TELE:  EKG:

## 2021-12-03 NOTE — PROGRESS NOTE ADULT - REASON FOR ADMISSION
coronary aneurysm

## 2021-12-03 NOTE — PROGRESS NOTE ADULT - SUBJECTIVE AND OBJECTIVE BOX
DATE OF SERVICE: 12-03-21 @ 11:44  CHIEF COMPLAINT:Patient is a 41y old  Female who presents with a chief complaint of coronary aneurysm (02 Dec 2021 12:04)    	        PAST MEDICAL & SURGICAL HISTORY:  HTN (hypertension)    CVA (cerebrovascular accident)    History of tracheostomy            REVIEW OF SYSTEMS:  CONSTITUTIONAL: No fever, weight loss, or fatigue  EYES: No eye pain, visual disturbances, or discharge  NECK: No pain or stiffness  RESPIRATORY: No cough, wheezing, chills or hemoptysis; No Shortness of Breath  CARDIOVASCULAR: No chest pain, palpitations, passing out, dizziness, or leg swelling  GASTROINTESTINAL: No abdominal or epigastric pain. No nausea, vomiting, or hematemesis; No diarrhea or constipation. No melena or hematochezia.  GENITOURINARY: No dysuria, frequency, hematuria, or incontinence  NEUROLOGICAL: No headaches,   Medications:  MEDICATIONS  (STANDING):  amLODIPine   Tablet 10 milliGRAM(s) Oral daily  apixaban 5 milliGRAM(s) Oral two times a day  aspirin enteric coated 81 milliGRAM(s) Oral daily  ATENolol  Tablet 75 milliGRAM(s) Oral daily  atorvastatin 20 milliGRAM(s) Oral at bedtime  cloNIDine 0.1 milliGRAM(s) Oral two times a day  influenza   Vaccine 0.5 milliLiter(s) IntraMuscular once  montelukast 10 milliGRAM(s) Oral daily  sodium chloride 0.9% lock flush 3 milliLiter(s) IV Push every 8 hours    MEDICATIONS  (PRN):    	    PHYSICAL EXAM:  T(C): 36.6 (12-03-21 @ 04:56), Max: 37 (12-02-21 @ 14:57)  HR: 103 (12-03-21 @ 04:56) (77 - 103)  BP: 116/74 (12-03-21 @ 04:56) (116/74 - 124/81)  RR: 18 (12-03-21 @ 04:56) (18 - 18)  SpO2: 96% (12-03-21 @ 04:56) (96% - 98%)  Wt(kg): --  I&O's Summary    02 Dec 2021 07:01  -  03 Dec 2021 07:00  --------------------------------------------------------  IN: 360 mL / OUT: 0 mL / NET: 360 mL        Appearance: Normal	  HEENT:   Normal oral mucosa, PERRL, EOMI	  Lymphatic: No lymphadenopathy  Cardiovascular: Normal S1 S2, No JVD, No murmurs, No edema  Respiratory: Lungs clear to auscultation	  Psychiatry: A & O   Gastrointestinal:  Soft, Non-tender, + BS	  Skin: No rashes, No ecchymoses, No cyanosis	  Neurologic: Non-focal  Extremities: Normal range of motion, No clubbing, cyanosis or edema  Vascular: Peripheral pulses palpable 2+ bilaterally    TELEMETRY: 	    ECG:  	  RADIOLOGY:  OTHER: 	  	  LABS:	 	    CARDIAC MARKERS:                  proBNP:   Lipid Profile:   HgA1c:   TSH:

## 2021-12-03 NOTE — PROGRESS NOTE ADULT - PROVIDER SPECIALTY LIST ADULT
Cardiology
Cardiology
CT Surgery
Cardiology
Cardiology
Internal Medicine
CT Surgery
Heart Failure
CT Surgery

## 2021-12-03 NOTE — CHART NOTE - NSCHARTNOTEFT_GEN_A_CORE
Spoke with Dr Camara, asked to facilitate patient discharge home. Medication reconciliation reviewed, revised and resolved with Dr Camara who has medically cleared patient for discharge with follow up advised
Patient has radiological studies today to evaluate for blood vessel aneurysm else where in the body .   CT abdomen/pelvis : No evidence of aneurysm in the abdomen or pelvis and  CT neck: No evidence of vascular aneurysm or stenosis.  Patient also had MRI/MRA of brain with results  < from: MR Head No Cont (12.01.21 @ 09:11) >    Brain MRI: Basis pontis chronic hemorrhage versus cavernoma with pontine and cerebellar atrophy.  Chronic right basal ganglia lacunar infarct.  Brain MRA: No evidence for vascular aneurysm. Decreased flow within the left middle cerebral artery without definite focal stenosis, may be artifactual in nature. CTA would be valuable for further evaluation.    < end of copied text >    Spoke with attending Dr. Camara, recommend Neurology and Neurosurgery consult both were called and will be into evaluate patient     Report given to on coming team

## 2021-12-03 NOTE — DISCHARGE NOTE NURSING/CASE MANAGEMENT/SOCIAL WORK - PATIENT PORTAL LINK FT
You can access the FollowMyHealth Patient Portal offered by [facility] by registering at the following website: [email or web address]. By joining [employer]’s FollowMyHealth portal, you will also be able to view your health information using other applications (apps) compatible with our system.

## 2021-12-03 NOTE — DISCHARGE NOTE NURSING/CASE MANAGEMENT/SOCIAL WORK - NSDCPEFALRISK_GEN_ALL_CORE
For information on Fall & Injury Prevention, visit: [email or web address] OR  [email or web address] OR  https://www.cdc.gov/steadi/patient.html More [place]   MRN: [id number]    Department:  [facility] Emergency Department   Date of Visit:  9/8/2017           Disclosure     Insurance plans vary and the physician(s) referred by the ER may not be covered by your plan.  Please contact you If you have been prescribed any medication(s), please fill your prescription right away and begin taking the medication(s) as directed    If the emergency physician has read X-rays, these will be re-interpreted by a radiologist.  If there is a significant

## 2021-12-22 PROCEDURE — 93925 LOWER EXTREMITY STUDY: CPT

## 2021-12-22 PROCEDURE — 36415 COLL VENOUS BLD VENIPUNCTURE: CPT

## 2021-12-22 PROCEDURE — C8929: CPT

## 2021-12-22 PROCEDURE — 97530 THERAPEUTIC ACTIVITIES: CPT

## 2021-12-22 PROCEDURE — 84436 ASSAY OF TOTAL THYROXINE: CPT

## 2021-12-22 PROCEDURE — 93005 ELECTROCARDIOGRAM TRACING: CPT

## 2021-12-22 PROCEDURE — U0003: CPT

## 2021-12-22 PROCEDURE — 97166 OT EVAL MOD COMPLEX 45 MIN: CPT

## 2021-12-22 PROCEDURE — 85025 COMPLETE CBC W/AUTO DIFF WBC: CPT

## 2021-12-22 PROCEDURE — 87641 MR-STAPH DNA AMP PROBE: CPT

## 2021-12-22 PROCEDURE — 74174 CTA ABD&PLVS W/CONTRAST: CPT

## 2021-12-22 PROCEDURE — 86850 RBC ANTIBODY SCREEN: CPT

## 2021-12-22 PROCEDURE — 97163 PT EVAL HIGH COMPLEX 45 MIN: CPT

## 2021-12-22 PROCEDURE — 97535 SELF CARE MNGMENT TRAINING: CPT

## 2021-12-22 PROCEDURE — 84484 ASSAY OF TROPONIN QUANT: CPT

## 2021-12-22 PROCEDURE — 71045 X-RAY EXAM CHEST 1 VIEW: CPT

## 2021-12-22 PROCEDURE — C1887: CPT

## 2021-12-22 PROCEDURE — 83036 HEMOGLOBIN GLYCOSYLATED A1C: CPT

## 2021-12-22 PROCEDURE — 85027 COMPLETE CBC AUTOMATED: CPT

## 2021-12-22 PROCEDURE — 87640 STAPH A DNA AMP PROBE: CPT

## 2021-12-22 PROCEDURE — 70544 MR ANGIOGRAPHY HEAD W/O DYE: CPT

## 2021-12-22 PROCEDURE — 84443 ASSAY THYROID STIM HORMONE: CPT

## 2021-12-22 PROCEDURE — 86901 BLOOD TYPING SEROLOGIC RH(D): CPT

## 2021-12-22 PROCEDURE — 83880 ASSAY OF NATRIURETIC PEPTIDE: CPT

## 2021-12-22 PROCEDURE — C1769: CPT

## 2021-12-22 PROCEDURE — 81001 URINALYSIS AUTO W/SCOPE: CPT

## 2021-12-22 PROCEDURE — 70498 CT ANGIOGRAPHY NECK: CPT

## 2021-12-22 PROCEDURE — 84132 ASSAY OF SERUM POTASSIUM: CPT

## 2021-12-22 PROCEDURE — 80053 COMPREHEN METABOLIC PANEL: CPT

## 2021-12-22 PROCEDURE — 99152 MOD SED SAME PHYS/QHP 5/>YRS: CPT

## 2021-12-22 PROCEDURE — 82553 CREATINE MB FRACTION: CPT

## 2021-12-22 PROCEDURE — U0005: CPT

## 2021-12-22 PROCEDURE — 86900 BLOOD TYPING SEROLOGIC ABO: CPT

## 2021-12-22 PROCEDURE — 84702 CHORIONIC GONADOTROPIN TEST: CPT

## 2021-12-22 PROCEDURE — 85610 PROTHROMBIN TIME: CPT

## 2021-12-22 PROCEDURE — 94010 BREATHING CAPACITY TEST: CPT

## 2021-12-22 PROCEDURE — 93454 CORONARY ARTERY ANGIO S&I: CPT

## 2021-12-22 PROCEDURE — 70551 MRI BRAIN STEM W/O DYE: CPT

## 2021-12-22 PROCEDURE — 82550 ASSAY OF CK (CPK): CPT

## 2021-12-22 PROCEDURE — 80048 BASIC METABOLIC PNL TOTAL CA: CPT

## 2021-12-22 PROCEDURE — 85384 FIBRINOGEN ACTIVITY: CPT

## 2021-12-22 PROCEDURE — 84480 ASSAY TRIIODOTHYRONINE (T3): CPT

## 2021-12-22 PROCEDURE — 85730 THROMBOPLASTIN TIME PARTIAL: CPT

## 2021-12-22 PROCEDURE — C1894: CPT

## 2021-12-23 ENCOUNTER — RESULT REVIEW (OUTPATIENT)
Age: 41
End: 2021-12-23

## 2022-08-15 PROBLEM — I10 ESSENTIAL (PRIMARY) HYPERTENSION: Chronic | Status: ACTIVE | Noted: 2021-11-24

## 2022-08-15 PROBLEM — I63.9 CEREBRAL INFARCTION, UNSPECIFIED: Chronic | Status: ACTIVE | Noted: 2021-11-24

## 2022-08-19 RX ORDER — ONABOTULINUMTOXINA 100 [USP'U]/1
100 INJECTION, POWDER, LYOPHILIZED, FOR SOLUTION INTRADERMAL; INTRAMUSCULAR
Qty: 1 | Refills: 0 | Status: COMPLETED | OUTPATIENT
Start: 2018-01-22 | End: 2022-08-19

## 2022-08-19 RX ORDER — APIXABAN 2.5 MG/1
2.5 TABLET, FILM COATED ORAL
Qty: 60 | Refills: 0 | Status: ACTIVE | COMMUNITY
Start: 2022-08-19

## 2022-08-19 RX ORDER — ONABOTULINUMTOXINA 100 [USP'U]/1
100 INJECTION, POWDER, LYOPHILIZED, FOR SOLUTION INTRADERMAL; INTRAMUSCULAR
Qty: 1 | Refills: 0 | Status: COMPLETED | OUTPATIENT
Start: 2019-03-25 | End: 2022-08-19

## 2022-08-19 RX ORDER — ONABOTULINUMTOXINA 100 [USP'U]/1
100 INJECTION, POWDER, LYOPHILIZED, FOR SOLUTION INTRADERMAL; INTRAMUSCULAR
Qty: 1 | Refills: 0 | Status: COMPLETED | OUTPATIENT
Start: 2020-11-02 | End: 2022-08-19

## 2022-08-19 RX ORDER — AZELASTINE HYDROCHLORIDE 137 UG/1
0.1 SPRAY, METERED NASAL
Qty: 30 | Refills: 0 | Status: COMPLETED | COMMUNITY
Start: 2020-10-15 | End: 2022-08-19

## 2022-08-19 RX ORDER — ASPIRIN ENTERIC COATED TABLETS 81 MG 81 MG/1
81 TABLET, DELAYED RELEASE ORAL DAILY
Qty: 30 | Refills: 0 | Status: ACTIVE | COMMUNITY
Start: 2022-08-19

## 2022-08-19 RX ORDER — DICLOFENAC SODIUM 75 MG/1
75 TABLET, DELAYED RELEASE ORAL
Qty: 28 | Refills: 0 | Status: COMPLETED | COMMUNITY
Start: 2017-01-24 | End: 2022-08-19

## 2022-08-19 RX ORDER — ONABOTULINUMTOXINA 100 [USP'U]/1
100 INJECTION, POWDER, LYOPHILIZED, FOR SOLUTION INTRADERMAL; INTRAMUSCULAR
Qty: 1 | Refills: 0 | Status: COMPLETED | OUTPATIENT
Start: 2019-11-04 | End: 2022-08-19

## 2022-08-19 RX ORDER — ONABOTULINUMTOXINA 100 [USP'U]/1
100 INJECTION, POWDER, LYOPHILIZED, FOR SOLUTION INTRADERMAL; INTRAMUSCULAR
Qty: 1 | Refills: 0 | Status: COMPLETED | OUTPATIENT
Start: 2018-10-08 | End: 2022-08-19

## 2022-08-19 RX ORDER — ONABOTULINUMTOXINA 100 [USP'U]/1
100 INJECTION, POWDER, LYOPHILIZED, FOR SOLUTION INTRADERMAL; INTRAMUSCULAR
Qty: 1 | Refills: 0 | Status: COMPLETED | OUTPATIENT
Start: 2017-10-10 | End: 2022-08-19

## 2022-08-19 RX ORDER — ONABOTULINUMTOXINA 100 [USP'U]/1
100 INJECTION, POWDER, LYOPHILIZED, FOR SOLUTION INTRADERMAL; INTRAMUSCULAR
Qty: 1 | Refills: 0 | Status: COMPLETED | OUTPATIENT
Start: 2018-03-08 | End: 2022-08-19

## 2022-08-19 RX ORDER — CLONIDINE HYDROCHLORIDE 0.1 MG/1
0.1 TABLET ORAL TWICE DAILY
Refills: 0 | Status: ACTIVE | COMMUNITY
Start: 2022-08-19

## 2022-08-19 RX ORDER — ONABOTULINUMTOXINA 100 [USP'U]/1
100 INJECTION, POWDER, LYOPHILIZED, FOR SOLUTION INTRADERMAL; INTRAMUSCULAR
Qty: 1 | Refills: 0 | Status: COMPLETED | OUTPATIENT
Start: 2018-06-14 | End: 2022-08-19

## 2022-08-19 RX ORDER — ONABOTULINUMTOXINA 100 [USP'U]/1
100 INJECTION, POWDER, LYOPHILIZED, FOR SOLUTION INTRADERMAL; INTRAMUSCULAR
Qty: 1 | Refills: 0 | Status: COMPLETED | OUTPATIENT
Start: 2020-07-02 | End: 2022-08-19

## 2022-08-24 ENCOUNTER — APPOINTMENT (OUTPATIENT)
Dept: CARDIOTHORACIC SURGERY | Facility: CLINIC | Age: 42
End: 2022-08-24

## 2022-08-31 ENCOUNTER — APPOINTMENT (OUTPATIENT)
Dept: CARDIOTHORACIC SURGERY | Facility: CLINIC | Age: 42
End: 2022-08-31

## 2022-08-31 VITALS
RESPIRATION RATE: 14 BRPM | SYSTOLIC BLOOD PRESSURE: 145 MMHG | OXYGEN SATURATION: 99 % | WEIGHT: 143 LBS | TEMPERATURE: 98 F | DIASTOLIC BLOOD PRESSURE: 84 MMHG | HEIGHT: 65 IN | HEART RATE: 64 BPM | BODY MASS INDEX: 23.82 KG/M2

## 2022-08-31 DIAGNOSIS — I25.41 CORONARY ARTERY ANEURYSM: ICD-10-CM

## 2022-08-31 PROCEDURE — 99214 OFFICE O/P EST MOD 30 MIN: CPT

## 2022-08-31 NOTE — PHYSICAL EXAM
[Sclera] : the sclera and conjunctiva were normal [Neck Appearance] : the appearance of the neck was normal [Jugular Venous Distention Increased] : there was no jugular-venous distention [] : no respiratory distress [Respiration, Rhythm And Depth] : normal respiratory rhythm and effort [Exaggerated Use Of Accessory Muscles For Inspiration] : no accessory muscle use [Auscultation Breath Sounds / Voice Sounds] : lungs were clear to auscultation bilaterally [Apical Impulse] : the apical impulse was normal [Heart Rate And Rhythm] : heart rate was normal and rhythm regular [Heart Sounds] : normal S1 and S2 [Involuntary Movements] : no involuntary movements were seen [Skin Color & Pigmentation] : normal skin color and pigmentation [No Focal Deficits] : no focal deficits [Oriented To Time, Place, And Person] : oriented to person, place, and time [Impaired Insight] : insight and judgment were intact [Affect] : the affect was normal [Mood] : the mood was normal

## 2022-08-31 NOTE — END OF VISIT
[FreeTextEntry3] : Written by Sam Dunn NP, acting as a scribe for Dr. Casanova\par “The documentation recorded by the scribe accurately reflects the service I personally performed and the decisions made by me.” Signature Robby Casanova MD.

## 2022-08-31 NOTE — CONSULT LETTER
[Dear  ___] : Dear  [unfilled], [Courtesy Letter:] : I had the pleasure of seeing your patient, [unfilled], in my office today. [Please see my note below.] : Please see my note below. [Sincerely,] : Sincerely, [FreeTextEntry2] : Dr. Michaels [FreeTextEntry3] : Robby Casanova MD\par Department of Cardiovascular &Thoracic Surgery\par \par Cardiovascular &Thoracic Surgery\par [facility].\par

## 2022-08-31 NOTE — HISTORY OF PRESENT ILLNESS
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 She initially presented to to [facility] ED in November twice first for chest pain then again the same month for abnormal outpatient Cardiac CTA results showing coronary aneurysm. MI was rulled out at [facility] and patient was transferred to [facility] with Dr. Casanova CT Surgery evaluation and possible coronary aneurysm repair. During that time she underwent cardiac cath that showed proximal left main with a large saccular aneurysm. Large saccular anuerysm with LAD and Cx coming off of this confluence. LAD: showed a medium fusiform aneurysm. CX: Circumflex: Angiography showed a medium fusiform aneurysm.  RCA: Right coronary artery: Angiography shows mild atherosclerosis. Angiography showed a medium localized aneurysm.\par \par She was deemed not a surgical candidate for CT surgery and heart failure deemed her not a transplant candidate. She was to follow up with Dr. Mcneill of [facility] who initially referred her however since has followed up with Dr. Robles Castillo, cardiologist at [facility] who referred her back today. She was to follow up with Dr. Remy Villanueva  of Neurosurgery [phone] at [place]. \par \par Presents today with her cousin and health aid [facility].  She reports overall feeling well.  Has a headache today but feels better than when in the hospital last November.  Keeps busy at home with PT/OT every other day.  Able to walk with walker now as per pt and her cousin.  SHe denies any chest pain, back pain, SOB, swelling, palpitations or dizziness. She had followed up with Dr. Michaels for cardiology (family recommended) on June 23 who referred her back to us.  She had been on Eliquis 5mg BID that was being managed by her PCP Dr. Janet Ross however pt stopped taking Eliquis last week and is unsure if she wishes to continue. She denies any bleeding and reports no more chest pain while on the Eliquis. \par \par

## 2022-08-31 NOTE — DATA REVIEWED
[FreeTextEntry1] : The coronary circulation is right dominant.  LM: Proximal left main: Angiography showed a large saccular aneurysm. Cannot rule out aneurysmal thrombosis. Large saccular anuerysm with LAD and Cx coming off of this confluence. LAD: Left anterior descending artery: Angiography shows mild atherosclerosis. Angiography showed a medium fusiform aneurysm. CX: Circumflex: Angiography showed a medium fusiform aneurysm.  RCA: Right coronary artery: Angiography shows mild atherosclerosis.\par Angiography showed a medium localized aneurysm.\par \par TTE 11/24/2021: Normal mitral valve. Mild mitral regurgitation.\par 2. Aortic valve leaflet morphology not well visualized.\par Minimal aortic regurgitation.\par 3. Normal left ventricular systolic function. No segmental\par wall motion abnormalities.  Endocardial visualization\par enhanced with intravenous injection of Ultrasonic Enhancing\par Agent (Definity).\par 4. Normal right ventricular size and function.\par

## 2022-08-31 NOTE — ASSESSMENT
[FreeTextEntry1] : I reviewed the medical records and reports with patient and discussed the case with both patient and her cousin.  I discussed pt is not a surgical candidate given her comorbidities.  I discussed in detail that we recommend her to continue Eliquis given her coronary aneurysms and that her chest pain she initially presented with has resolved while on Eliquis.  I also discussed the potential risks of Eliquis especially potential bleeding. Pt and cousin both verbalized understanding of above.  Will reach out to PCP and cardiologist to update that it is our recommendation to continue Eliquis, however ultimately it is up to Eli herself if she wishes to continue. \par \par Plan:\par 1) Follow up with PCP and cardiologist\par 2) Recommend continue with Eliquis for coronary aneurysms\par 3) Follow up with neurosurgery as per DC back in December (Dr. Remy Villanueva [phone]).

## 2022-10-27 NOTE — PHYSICAL THERAPY INITIAL EVALUATION ADULT - TRANSFER SKILLS, REHAB EVAL
Navin Hoffman (: 1999) is a 26 [facility] Gregory Crawford MD needs device and assist

## 2022-11-19 NOTE — PHYSICAL EXAM
[FreeTextEntry1] : General: Well developed female in no apparent distress. Patient is awake, alert, cooperative examination.\par Extremities: No pedal edema.\par \par Motor:\par Right upper extremity/right lower extremity: Active range of motion within functional limits with 5/5 motor power throughout.\par \par Left upper extremity: Significant dysmetria noted on FNF. Patient with isolated active wrist extension, active digit flexion and extension. Patient maintains her thumb an adducted position and extended at IP joint. Patient's thumb to digit opposition is intact and able to do this with wrist in extended position. Patient able to fully extend digits with full wrist extension. Hand  4+/5 motor power.  Thumb to digit opposition intact but slow. Left FPL 4/5mp \par Left shoulder flexion to 110deg, 4/5mp\par \par Tone: \par Left wrist flexors MAS = 0.\par Left FPL MAS = 0 with a wrist flexed, MAS = 0 with wrist extended\par Left FPB MAS = 0\par Left AP MAS = 0\par Left FDS MAS=1\par \par Functional status: Patient ambulated with rolling walker with assistance with left hinged AFO with good HS and clearance of LLE. Trunk ataxia and wide based gait.  bed to chair

## 2022-11-21 ENCOUNTER — APPOINTMENT (OUTPATIENT)
Dept: PHYSICAL MEDICINE AND REHAB | Facility: CLINIC | Age: 42
End: 2022-11-21

## 2022-12-07 ENCOUNTER — APPOINTMENT (OUTPATIENT)
Dept: PHYSICAL MEDICINE AND REHAB | Facility: CLINIC | Age: 42
End: 2022-12-07

## 2022-12-07 DIAGNOSIS — R27.0 ATAXIA, UNSPECIFIED: ICD-10-CM

## 2022-12-07 DIAGNOSIS — I61.3 NONTRAUMATIC INTRACEREBRAL HEMORRHAGE IN BRAIN STEM: ICD-10-CM

## 2022-12-07 PROCEDURE — 99213 OFFICE O/P EST LOW 20 MIN: CPT

## 2022-12-07 PROCEDURE — 99072 ADDL SUPL MATRL&STAF TM PHE: CPT

## 2022-12-08 RX ORDER — APIXABAN 5 MG/1
5 TABLET, FILM COATED ORAL
Qty: 60 | Refills: 0 | Status: ACTIVE | COMMUNITY
Start: 2022-11-22

## 2022-12-08 RX ORDER — FLUTICASONE PROPIONATE 50 UG/1
50 SPRAY, METERED NASAL
Qty: 48 | Refills: 0 | Status: ACTIVE | COMMUNITY
Start: 2022-10-24

## 2022-12-08 NOTE — ASSESSMENT
[FreeTextEntry1] : Patient is a 42-year-old female history of a brainstem ICH with left spastic hemiparesis, ataxia with gait impairments noted above.  Prescription provided for outpatient physical therapy voiding a trial of gait training with hemiwalker and WBQC, see Rx.  Discussed with patient and aide that these devices should only be used with the physical therapist and patient should continue use of rolling walker at home.  Suggested adding weights to her rolling walker to better ground her during ambulation.  Prescription provided for a hemiwalker and WBQC.  We will continue to hold Botox injections.\par \par I spent a total of 20 minutes on the date of the encounter evaluating and treating the patient including a discussion of treatment options.

## 2022-12-08 NOTE — HISTORY OF PRESENT ILLNESS
[FreeTextEntry1] : Patient is a 42-year-old female history of a brainstem ICH with left spastic hemiparesis, ataxia who was last seen November 2, 2020. Patient's main issue today is desire to restart outpatient physical therapy.  Functionally patient ambulates with a rolling walker with assistance.  Patient has been receiving home physical therapy and the therapist desires a hemiwalker and WBQC for use during therapy.  No falls reported.  Patient's last Botox injection was November 12, 2020 and feels she has been doing well without the Botox injections.  No pain complaints.  No hygiene issues.  Patient discontinued AFO use approximately 2 years ago.

## 2022-12-08 NOTE — PHYSICAL EXAM
[FreeTextEntry1] : General: Well developed female in no apparent distress. Patient is awake, alert, cooperative examination.\par Extremities: (+)pes planus (B)\par \par Motor:\par Right upper extremity/right lower extremity: Active range of motion within functional limits with 5/5 motor power throughout.\par \par Left upper extremity: Significant dysmetria noted on FNF. Patient with isolated active wrist extension, active digit flexion and extension. Patient maintains her thumb an adducted position and extended at IP joint. Patient's thumb to digit opposition is intact and able to do this with wrist in extended position. Patient able to fully extend digits with full wrist extension. Hand  4+/5 motor power.  Thumb to digit opposition intact but slow. Left FPL 4/5mp \par Left shoulder flexion to 150deg, 4/5mp. \par \par Tone: \par Left wrist flexors MAS = 0.\par Left FPL MAS = 0 with a wrist flexed, MAS = 0 with wrist extended\par Left FPB MAS = 0\par Left AP MAS = 0\par Left FDS MAS=1\par \par \par Functional status: Patient ambulated with rolling walker with assistance without AFO, shallow HS. Trunk ataxia and wide based gait.

## 2022-12-19 NOTE — PRE-OP CHECKLIST - ANTIBIOTIC
Patient calling back    She said the generic would give her horrible headaches. She was on the brand name a year and a half before this and had no issues     She is asking if Paige would be willing to write an appeal letter as she is not interested in other forms of birth control       HENRY Mcadams RN  [facility]     n/a

## 2023-02-08 ENCOUNTER — APPOINTMENT (OUTPATIENT)
Dept: PHYSICAL MEDICINE AND REHAB | Facility: CLINIC | Age: 43
End: 2023-02-08

## 2023-03-01 ENCOUNTER — APPOINTMENT (OUTPATIENT)
Dept: PHYSICAL MEDICINE AND REHAB | Facility: CLINIC | Age: 43
End: 2023-03-01

## 2023-05-18 NOTE — PHYSICAL THERAPY INITIAL EVALUATION ADULT - IMPAIRMENTS FOUND, PT EVAL
Dressing: bandage
Billing Type: Third-Party Bill
Depth Of Biopsy: dermis
Anesthesia Volume In Cc: 0.5
Additional Anesthesia Volume In Cc (Will Not Render If 0): 0
Validate That Anesthesia Is Not 0: No
Hemostasis: Electrocautery
Electrodesiccation Text: The wound bed was treated with electrodesiccation after the biopsy was performed.
Was A Bandage Applied: Yes
Cryotherapy Text: The wound bed was treated with cryotherapy after the biopsy was performed.
Notification Instructions: Patient will be notified of biopsy results. However, patient instructed to call the office if not contacted within 2 weeks.
Biopsy Type: H and E
Silver Nitrate Text: The wound bed was treated with silver nitrate after the biopsy was performed.
Wound Care: Vaseline
Post-Care Instructions: I reviewed with the patient in detail post-care instructions. Patient is to keep the biopsy site dry overnight, and then apply bacitracin twice daily until healed. Patient may apply hydrogen peroxide soaks to remove any crusting.
Type Of Destruction Used: Curettage
Detail Level: Detailed
Curettage Text: The wound bed was treated with curettage after the biopsy was performed.
Anesthesia Type: 1% lidocaine with epinephrine
Consent: Written consent was obtained and risks were reviewed including but not limited to scarring, infection, bleeding, scabbing, nerve damage and allergy to anesthesia. Intent of procedure is to obtain tissue sample for histopathic examination.  A skin biopsy is considered a necessary and appropriate to clarify the diagnosis.
Size Of Lesion In Cm: 0.2
Electrodesiccation And Curettage Text: The wound bed was treated with electrodesiccation and curettage after the biopsy was performed.
Information: Selecting Yes will display possible errors in your note based on the variables you have selected. This validation is only offered as a suggestion for you. PLEASE NOTE THAT THE VALIDATION TEXT WILL BE REMOVED WHEN YOU FINALIZE YOUR NOTE. IF YOU WANT TO FAX A PRELIMINARY NOTE YOU WILL NEED TO TOGGLE THIS TO 'NO' IF YOU DO NOT WANT IT IN YOUR FAXED NOTE.
Biopsy Method: 10 blade
gait, locomotion, and balance/muscle strength/ROM

## 2023-05-22 NOTE — PHYSICAL THERAPY INITIAL EVALUATION ADULT - SITTING BALANCE: STATIC
normal balance Curvilinear Excision Additional Text (Leave Blank If You Do Not Want): The margin was drawn around the clinically apparent lesion.  A curvilinear shape was then drawn on the skin incorporating the lesion and margins.  Incisions were then made along these lines to the appropriate tissue plane and the lesion was extirpated.

## 2023-12-26 NOTE — PROGRESS NOTE ADULT - PA/NP ONLY VISIT
ACP only visit
ACP only visit
CONSTITUTIONAL: Well-appearing; well-nourished; in no apparent distress.   	HEAD: Normocephalic; atraumatic.   	EYES:  conjunctiva and sclera clear  	ENT: normal nose; no rhinorrhea; normal pharynx with no erythema or lesions. dry mucous membranes.   	NECK: Supple; non-tender;   	CARDIOVASCULAR: Normal S1, S2; no murmurs, rubs, or gallops. Regular rate and rhythm.   	RESPIRATORY: Breathing easily; breath sounds clear and equal bilaterally; no wheezes, rhonchi, or rales.  	GI: Soft; non-distended; non-tender  	EXT: MURDOCK x 4. normal gait.   	SKIN: Normal for age and race; warm; dry; good turgor; no apparent lesions or rash.   	NEURO: A & O x 3; face symmetric; grossly unremarkable.   PSYCHOLOGICAL: The patient’s mood and manner are appropriate.

## 2024-02-08 ENCOUNTER — NON-APPOINTMENT (OUTPATIENT)
Age: 44
End: 2024-02-08

## 2024-02-08 RX ORDER — DIVALPROEX SODIUM 250 MG/1
250 TABLET, DELAYED RELEASE ORAL DAILY
Refills: 0 | Status: ACTIVE | COMMUNITY

## 2024-02-08 RX ORDER — MECLIZINE HYDROCHLORIDE 25 MG/1
25 TABLET ORAL DAILY
Refills: 0 | Status: ACTIVE | COMMUNITY

## 2024-02-08 RX ORDER — HYDRALAZINE HYDROCHLORIDE 25 MG/1
25 TABLET ORAL TWICE DAILY
Refills: 0 | Status: ACTIVE | COMMUNITY

## 2024-02-27 ENCOUNTER — NON-APPOINTMENT (OUTPATIENT)
Age: 44
End: 2024-02-27

## 2024-02-27 RX ORDER — ATENOLOL 100 MG/1
100 TABLET ORAL DAILY
Refills: 0 | Status: ACTIVE | COMMUNITY

## 2024-03-20 ENCOUNTER — APPOINTMENT (OUTPATIENT)
Dept: ORTHOPEDIC SURGERY | Facility: CLINIC | Age: 44
End: 2024-03-20

## 2024-03-25 ENCOUNTER — APPOINTMENT (OUTPATIENT)
Dept: ORTHOPEDIC SURGERY | Facility: CLINIC | Age: 44
End: 2024-03-25
Payer: MEDICARE

## 2024-03-25 DIAGNOSIS — M22.2X2 PATELLOFEMORAL DISORDERS, LEFT KNEE: ICD-10-CM

## 2024-03-25 DIAGNOSIS — M70.61 TROCHANTERIC BURSITIS, RIGHT HIP: ICD-10-CM

## 2024-03-25 DIAGNOSIS — M25.562 PAIN IN LEFT KNEE: ICD-10-CM

## 2024-03-25 PROCEDURE — 99203 OFFICE O/P NEW LOW 30 MIN: CPT

## 2024-03-25 PROCEDURE — 73562 X-RAY EXAM OF KNEE 3: CPT | Mod: LT

## 2024-03-25 NOTE — PHYSICAL EXAM
[de-identified] : Constitutional: Well-nourished, well-developed, No acute distress Respiratory:  Good respiratory effort, no SOB Psychiatric: Pleasant and normal affect, alert and oriented x3  Left knee: APPEARANCE: 1+ swelling, no marked deformities, no malalignment POSITIVE TENDERNESS:  + crepitus of the anterior knee, and tenderness of patellar retinaculum, mild along medial joint line NONTENDER: Lateral joint line, patellar & quadriceps tendons, MCL/LCL, ITB at the lateral femoral condyle & Gerdy's tubercle, pes bursa.  ROM: full extension, flexion limited to 100 degrees due to stiffness and spasticity RESISTIVE TESTING: + discomfort with knee ext from deep knee flexion (stretched position), painless knee flexion.  SPECIAL TESTS: stable v/v stress. painless grind. neg Lachman's. neg ant/post drawer. neg Rafaela's.   Right hip: APPEARANCE: no marked deformities, no swelling or malalignment POSITIVE TENDERNESS: Greater trochanter, and mild distally along ITB NONTENDER: TFL, gluteal region, ischium/proximal hamstring region, hip flexor region, sartorius and pubic symphysis.  ROM: Moderately limited in all directions due to stiffness and spasticity RESISTIVE TESTING: Mild pain in lateral hip with resisted abduction, painless resisted ER/IR/SLR/adduction.  SPECIAL TESTS: neg SOPHIE/FADIR, painless loaded flexion & scouring  [de-identified] :  The following radiographs were ordered and read by me during this patient's visit. I reviewed each radiograph in detail with the patient and discussed the findings as highlighted below.   3 views of the left knee were obtained today that show no fracture, or dislocation. There are no degenerative changes seen. There is no malalignment. No obvious osseous abnormality. Otherwise unremarkable.

## 2024-03-25 NOTE — HISTORY OF PRESENT ILLNESS
[de-identified] : 43 y/o F with PMHx of hemorrhagic stroke that presents today with two complaints. The first is chronic hip pain of the right side. She has residual left sided weakness and spasticity from her stroke and compensates on the right side. The pain is experienced when she is sitting on her wheelchair or when trying to stand from a seated position. She has tried acupuncture which has mildly helped along with some supplements and natural anti-inflammatory.  Her knee pain occured after a fall she sustained 3 weeks ago. She is unsure of how she landed but might have landed on her knee. She had significant swelling and bruising thereeafter which has since gone down. She has taken NSAID's which have helped with the pain.  Jia Pozo(Attending)

## 2024-03-25 NOTE — DISCUSSION/SUMMARY
[de-identified] : Discussed findings of today's exam and possible causes of patient's pain.  Educated patient on their most probable diagnosis of left knee pain due to patellofemoral pain syndrome, and right hip pain due to trochanteric bursitis.  Reviewed possible courses of treatment, and we collaboratively decided best course of treatment at this time will include conservative management.  Patient is primarily wheelchair-bound and has known spasticity and left-sided weakness status post hemorrhagic stroke.  Patient has no evidence of internal derangement of her left knee, no evidence of any high-grade injury to her right hip.  Recommended trial of conservative management.  Patient may continue take oral NSAIDs as needed for pain relief.  Recommend topical heat in the morning or before activity, ice in the evening or after activity.  Patient is already undergoing a course of physical therapy, she may address these 2 issues with PT at this time.  Patient was accompanied to the office by her home health aide.  Follow up as needed.  Patient appreciates and agrees with current plan.  This note was generated using dragon medical dictation software.  A reasonable effort has been made for proofreading its contents, but typos may still remain.  If there are any questions or points of clarification needed please notify my office.

## 2024-04-22 ENCOUNTER — APPOINTMENT (OUTPATIENT)
Dept: INTERNAL MEDICINE | Facility: CLINIC | Age: 44
End: 2024-04-22

## 2024-05-07 NOTE — PATIENT PROFILE ADULT - PATIENT REPRESENTATIVE: ( YOU CAN CHOOSE ANY PERSON THAT CAN ASSIST YOU WITH YOUR HEALTH CARE PREFERENCES, DOES NOT HAVE TO BE A SPOUSE, IMMEDIATE FAMILY OR SIGNIFICANT OTHER/PARTNER)
Pre-Operative Diagnosis: CHRONIC ADHESIVE OTITS MEDIA, BILATERAL, ADENOID HYPERTROPHY     Post-Operative Diagnosis: CHRONIC ADHESIVE OTITS MEDIA, BILATERAL, ADENOID HYPERTROPHY      Procedure Performed:   BILATERAL REMOVAL OF MYRINGOTOMY TUBE AND BILATERAL TUBE INSERSTION, BILATERAL ADENOIDECTOMY    Surgeons and Role:     * Jose Cruz Brennan MD - Primary    Assistant(s):        Surgical Findings: adenoid hypertrophy, bilateral mucoid effusion     Specimen: none     Estimated Blood Loss: Blood Output: 2 mL (5/7/2024  7:50 AM)      Dictation Number:  n/a    Jose Cruz Brennan MD  5/7/2024  11:53 AM          
same name as above

## 2024-05-15 ENCOUNTER — APPOINTMENT (OUTPATIENT)
Dept: ORTHOPEDIC SURGERY | Facility: CLINIC | Age: 44
End: 2024-05-15

## 2024-05-22 ENCOUNTER — APPOINTMENT (OUTPATIENT)
Dept: ORTHOPEDIC SURGERY | Facility: CLINIC | Age: 44
End: 2024-05-22

## 2024-10-07 NOTE — REASON FOR VISIT
Detail Level: Generalized Detail Level: Zone [Follow-Up] : a follow-up visit [Friend] : friend Detail Level: Simple Sunscreen Recommendations: DENYS sunscreen

## 2025-01-01 ENCOUNTER — INPATIENT (INPATIENT)
Facility: HOSPITAL | Age: 45
LOS: 11 days | End: 2025-04-08
Attending: STUDENT IN AN ORGANIZED HEALTH CARE EDUCATION/TRAINING PROGRAM | Admitting: STUDENT IN AN ORGANIZED HEALTH CARE EDUCATION/TRAINING PROGRAM
Payer: MEDICARE

## 2025-01-01 VITALS — HEART RATE: 96 BPM | RESPIRATION RATE: 12 BRPM | OXYGEN SATURATION: 98 %

## 2025-01-01 VITALS
OXYGEN SATURATION: 98 % | RESPIRATION RATE: 12 BRPM | HEART RATE: 92 BPM | DIASTOLIC BLOOD PRESSURE: 76 MMHG | SYSTOLIC BLOOD PRESSURE: 89 MMHG

## 2025-01-01 DIAGNOSIS — Z98.890 OTHER SPECIFIED POSTPROCEDURAL STATES: Chronic | ICD-10-CM

## 2025-01-01 DIAGNOSIS — I21.3 ST ELEVATION (STEMI) MYOCARDIAL INFARCTION OF UNSPECIFIED SITE: ICD-10-CM

## 2025-01-01 DIAGNOSIS — Z93.1 GASTROSTOMY STATUS: Chronic | ICD-10-CM

## 2025-01-01 LAB
A1C WITH ESTIMATED AVERAGE GLUCOSE RESULT: 5.3 % — SIGNIFICANT CHANGE UP (ref 4–5.6)
ADD ON TEST-SPECIMEN IN LAB: SIGNIFICANT CHANGE UP
ALBUMIN SERPL ELPH-MCNC: 3 G/DL — LOW (ref 3.3–5)
ALBUMIN SERPL ELPH-MCNC: 3.1 G/DL — LOW (ref 3.3–5)
ALBUMIN SERPL ELPH-MCNC: 3.2 G/DL — LOW (ref 3.3–5)
ALBUMIN SERPL ELPH-MCNC: 3.3 G/DL — SIGNIFICANT CHANGE UP (ref 3.3–5)
ALBUMIN SERPL ELPH-MCNC: 3.3 G/DL — SIGNIFICANT CHANGE UP (ref 3.3–5)
ALBUMIN SERPL ELPH-MCNC: 3.4 G/DL — SIGNIFICANT CHANGE UP (ref 3.3–5)
ALBUMIN SERPL ELPH-MCNC: 3.5 G/DL — SIGNIFICANT CHANGE UP (ref 3.3–5)
ALBUMIN SERPL ELPH-MCNC: 3.8 G/DL — SIGNIFICANT CHANGE UP (ref 3.3–5)
ALP SERPL-CCNC: 51 U/L — SIGNIFICANT CHANGE UP (ref 40–120)
ALP SERPL-CCNC: 53 U/L — SIGNIFICANT CHANGE UP (ref 40–120)
ALP SERPL-CCNC: 54 U/L — SIGNIFICANT CHANGE UP (ref 40–120)
ALP SERPL-CCNC: 55 U/L — SIGNIFICANT CHANGE UP (ref 40–120)
ALP SERPL-CCNC: 56 U/L — SIGNIFICANT CHANGE UP (ref 40–120)
ALP SERPL-CCNC: 57 U/L — SIGNIFICANT CHANGE UP (ref 40–120)
ALP SERPL-CCNC: 57 U/L — SIGNIFICANT CHANGE UP (ref 40–120)
ALP SERPL-CCNC: 58 U/L — SIGNIFICANT CHANGE UP (ref 40–120)
ALP SERPL-CCNC: 58 U/L — SIGNIFICANT CHANGE UP (ref 40–120)
ALP SERPL-CCNC: 59 U/L — SIGNIFICANT CHANGE UP (ref 40–120)
ALP SERPL-CCNC: 60 U/L — SIGNIFICANT CHANGE UP (ref 40–120)
ALP SERPL-CCNC: 61 U/L — SIGNIFICANT CHANGE UP (ref 40–120)
ALP SERPL-CCNC: 64 U/L — SIGNIFICANT CHANGE UP (ref 40–120)
ALP SERPL-CCNC: 66 U/L — SIGNIFICANT CHANGE UP (ref 40–120)
ALP SERPL-CCNC: 67 U/L — SIGNIFICANT CHANGE UP (ref 40–120)
ALP SERPL-CCNC: 76 U/L — SIGNIFICANT CHANGE UP (ref 40–120)
ALT FLD-CCNC: 100 U/L — HIGH (ref 4–33)
ALT FLD-CCNC: 103 U/L — HIGH (ref 4–33)
ALT FLD-CCNC: 103 U/L — HIGH (ref 4–33)
ALT FLD-CCNC: 105 U/L — HIGH (ref 4–33)
ALT FLD-CCNC: 112 U/L — HIGH (ref 4–33)
ALT FLD-CCNC: 137 U/L — HIGH (ref 4–33)
ALT FLD-CCNC: 160 U/L — HIGH (ref 4–33)
ALT FLD-CCNC: 219 U/L — HIGH (ref 4–33)
ALT FLD-CCNC: 322 U/L — HIGH (ref 4–33)
ALT FLD-CCNC: 497 U/L — HIGH (ref 4–33)
ALT FLD-CCNC: 55 U/L — HIGH (ref 4–33)
ALT FLD-CCNC: 60 U/L — HIGH (ref 4–33)
ALT FLD-CCNC: 64 U/L — HIGH (ref 4–33)
ALT FLD-CCNC: 65 U/L — HIGH (ref 4–33)
ALT FLD-CCNC: 653 U/L — HIGH (ref 4–33)
ALT FLD-CCNC: 66 U/L — HIGH (ref 4–33)
ALT FLD-CCNC: 67 U/L — HIGH (ref 4–33)
ALT FLD-CCNC: 71 U/L — HIGH (ref 4–33)
ALT FLD-CCNC: 78 U/L — HIGH (ref 4–33)
ALT FLD-CCNC: 784 U/L — HIGH (ref 4–33)
ALT FLD-CCNC: 82 U/L — HIGH (ref 4–33)
ALT FLD-CCNC: 82 U/L — HIGH (ref 4–33)
ALT FLD-CCNC: 83 U/L — HIGH (ref 4–33)
ALT FLD-CCNC: 86 U/L — HIGH (ref 4–33)
ALT FLD-CCNC: 867 U/L — HIGH (ref 4–33)
ALT FLD-CCNC: 90 U/L — HIGH (ref 4–33)
ALT FLD-CCNC: 93 U/L — HIGH (ref 4–33)
AMYLASE P1 CFR SERPL: 25 U/L — SIGNIFICANT CHANGE UP (ref 25–125)
AMYLASE P1 CFR SERPL: 28 U/L — SIGNIFICANT CHANGE UP (ref 25–125)
AMYLASE P1 CFR SERPL: 28 U/L — SIGNIFICANT CHANGE UP (ref 25–125)
AMYLASE P1 CFR SERPL: 29 U/L — SIGNIFICANT CHANGE UP (ref 25–125)
AMYLASE P1 CFR SERPL: 30 U/L — SIGNIFICANT CHANGE UP (ref 25–125)
AMYLASE P1 CFR SERPL: 31 U/L — SIGNIFICANT CHANGE UP (ref 25–125)
AMYLASE P1 CFR SERPL: 32 U/L — SIGNIFICANT CHANGE UP (ref 25–125)
AMYLASE P1 CFR SERPL: 34 U/L — SIGNIFICANT CHANGE UP (ref 25–125)
ANION GAP SERPL CALC-SCNC: 10 MMOL/L — SIGNIFICANT CHANGE UP (ref 7–14)
ANION GAP SERPL CALC-SCNC: 11 MMOL/L — SIGNIFICANT CHANGE UP (ref 7–14)
ANION GAP SERPL CALC-SCNC: 12 MMOL/L — SIGNIFICANT CHANGE UP (ref 7–14)
ANION GAP SERPL CALC-SCNC: 13 MMOL/L — SIGNIFICANT CHANGE UP (ref 7–14)
ANION GAP SERPL CALC-SCNC: 14 MMOL/L — SIGNIFICANT CHANGE UP (ref 7–14)
ANION GAP SERPL CALC-SCNC: 16 MMOL/L — HIGH (ref 7–14)
ANION GAP SERPL CALC-SCNC: 18 MMOL/L — HIGH (ref 7–14)
ANION GAP SERPL CALC-SCNC: 19 MMOL/L — HIGH (ref 7–14)
ANION GAP SERPL CALC-SCNC: 19 MMOL/L — HIGH (ref 7–14)
ANION GAP SERPL CALC-SCNC: 20 MMOL/L — HIGH (ref 7–14)
ANION GAP SERPL CALC-SCNC: 21 MMOL/L — HIGH (ref 7–14)
ANISOCYTOSIS BLD QL: SLIGHT — SIGNIFICANT CHANGE UP
APPEARANCE UR: ABNORMAL
APPEARANCE UR: ABNORMAL
APPEARANCE UR: CLEAR — SIGNIFICANT CHANGE UP
APTT BLD: 199.1 SEC — CRITICAL HIGH (ref 24.5–35.6)
APTT BLD: 27.9 SEC — SIGNIFICANT CHANGE UP (ref 24.5–35.6)
APTT BLD: 28 SEC — SIGNIFICANT CHANGE UP (ref 24.5–35.6)
APTT BLD: 28 SEC — SIGNIFICANT CHANGE UP (ref 24.5–35.6)
APTT BLD: 28.2 SEC — SIGNIFICANT CHANGE UP (ref 24.5–35.6)
APTT BLD: 28.5 SEC — SIGNIFICANT CHANGE UP (ref 24.5–35.6)
APTT BLD: 28.5 SEC — SIGNIFICANT CHANGE UP (ref 24.5–35.6)
APTT BLD: 28.6 SEC — SIGNIFICANT CHANGE UP (ref 24.5–35.6)
APTT BLD: 28.7 SEC — SIGNIFICANT CHANGE UP (ref 24.5–35.6)
APTT BLD: 28.8 SEC — SIGNIFICANT CHANGE UP (ref 24.5–35.6)
APTT BLD: 29 SEC — SIGNIFICANT CHANGE UP (ref 24.5–35.6)
APTT BLD: 29 SEC — SIGNIFICANT CHANGE UP (ref 24.5–35.6)
APTT BLD: 29.1 SEC — SIGNIFICANT CHANGE UP (ref 24.5–35.6)
APTT BLD: 29.2 SEC — SIGNIFICANT CHANGE UP (ref 24.5–35.6)
APTT BLD: 29.2 SEC — SIGNIFICANT CHANGE UP (ref 24.5–35.6)
APTT BLD: 29.7 SEC — SIGNIFICANT CHANGE UP (ref 24.5–35.6)
APTT BLD: 29.7 SEC — SIGNIFICANT CHANGE UP (ref 24.5–35.6)
APTT BLD: 29.8 SEC — SIGNIFICANT CHANGE UP (ref 24.5–35.6)
APTT BLD: 29.9 SEC — SIGNIFICANT CHANGE UP (ref 24.5–35.6)
APTT BLD: 48.8 SEC — HIGH (ref 24.5–35.6)
APTT BLD: 49.9 SEC — HIGH (ref 24.5–35.6)
APTT BLD: 50.1 SEC — HIGH (ref 24.5–35.6)
APTT BLD: 57.5 SEC — HIGH (ref 24.5–35.6)
APTT BLD: 58.6 SEC — HIGH (ref 24.5–35.6)
APTT BLD: 64.7 SEC — HIGH (ref 24.5–35.6)
APTT BLD: 68 SEC — HIGH (ref 24.5–35.6)
AST SERPL-CCNC: 122 U/L — HIGH (ref 4–32)
AST SERPL-CCNC: 140 U/L — HIGH (ref 4–32)
AST SERPL-CCNC: 157 U/L — HIGH (ref 4–32)
AST SERPL-CCNC: 222 U/L — HIGH (ref 4–32)
AST SERPL-CCNC: 397 U/L — HIGH (ref 4–32)
AST SERPL-CCNC: 56 U/L — HIGH (ref 4–32)
AST SERPL-CCNC: 60 U/L — HIGH (ref 4–32)
AST SERPL-CCNC: 61 U/L — HIGH (ref 4–32)
AST SERPL-CCNC: 63 U/L — HIGH (ref 4–32)
AST SERPL-CCNC: 63 U/L — HIGH (ref 4–32)
AST SERPL-CCNC: 64 U/L — HIGH (ref 4–32)
AST SERPL-CCNC: 65 U/L — HIGH (ref 4–32)
AST SERPL-CCNC: 66 U/L — HIGH (ref 4–32)
AST SERPL-CCNC: 67 U/L — HIGH (ref 4–32)
AST SERPL-CCNC: 68 U/L — HIGH (ref 4–32)
AST SERPL-CCNC: 690 U/L — HIGH (ref 4–32)
AST SERPL-CCNC: 74 U/L — HIGH (ref 4–32)
AST SERPL-CCNC: 74 U/L — HIGH (ref 4–32)
AST SERPL-CCNC: 75 U/L — HIGH (ref 4–32)
AST SERPL-CCNC: 78 U/L — HIGH (ref 4–32)
AST SERPL-CCNC: 80 U/L — HIGH (ref 4–32)
AST SERPL-CCNC: 80 U/L — HIGH (ref 4–32)
AST SERPL-CCNC: 85 U/L — HIGH (ref 4–32)
AST SERPL-CCNC: 87 U/L — HIGH (ref 4–32)
AST SERPL-CCNC: 89 U/L — HIGH (ref 4–32)
AST SERPL-CCNC: 983 U/L — HIGH (ref 4–32)
AST SERPL-CCNC: 984 U/L — HIGH (ref 4–32)
BACTERIA # UR AUTO: ABNORMAL /HPF
BACTERIA # UR AUTO: NEGATIVE /HPF — SIGNIFICANT CHANGE UP
BASE EXCESS BLDA CALC-SCNC: -5.7 MMOL/L — LOW (ref -2–3)
BASE EXCESS BLDV CALC-SCNC: -3.6 MMOL/L — LOW (ref -2–3)
BASOPHILS # BLD AUTO: 0.03 K/UL — SIGNIFICANT CHANGE UP (ref 0–0.2)
BASOPHILS # BLD AUTO: 0.03 K/UL — SIGNIFICANT CHANGE UP (ref 0–0.2)
BASOPHILS # BLD AUTO: 0.04 K/UL — SIGNIFICANT CHANGE UP (ref 0–0.2)
BASOPHILS # BLD AUTO: 0.05 K/UL — SIGNIFICANT CHANGE UP (ref 0–0.2)
BASOPHILS # BLD AUTO: 0.06 K/UL — SIGNIFICANT CHANGE UP (ref 0–0.2)
BASOPHILS # BLD AUTO: 0.07 K/UL — SIGNIFICANT CHANGE UP (ref 0–0.2)
BASOPHILS # BLD AUTO: 0.16 K/UL — SIGNIFICANT CHANGE UP (ref 0–0.2)
BASOPHILS # BLD AUTO: 0.4 K/UL — HIGH (ref 0–0.2)
BASOPHILS NFR BLD AUTO: 0.1 % — SIGNIFICANT CHANGE UP (ref 0–2)
BASOPHILS NFR BLD AUTO: 0.1 % — SIGNIFICANT CHANGE UP (ref 0–2)
BASOPHILS NFR BLD AUTO: 0.2 % — SIGNIFICANT CHANGE UP (ref 0–2)
BASOPHILS NFR BLD AUTO: 0.3 % — SIGNIFICANT CHANGE UP (ref 0–2)
BASOPHILS NFR BLD AUTO: 0.4 % — SIGNIFICANT CHANGE UP (ref 0–2)
BASOPHILS NFR BLD AUTO: 0.5 % — SIGNIFICANT CHANGE UP (ref 0–2)
BASOPHILS NFR BLD AUTO: 0.9 % — SIGNIFICANT CHANGE UP (ref 0–2)
BASOPHILS NFR BLD AUTO: 2.6 % — HIGH (ref 0–2)
BILIRUB DIRECT SERPL-MCNC: <0.2 MG/DL — SIGNIFICANT CHANGE UP (ref 0–0.3)
BILIRUB INDIRECT FLD-MCNC: >0 MG/DL — SIGNIFICANT CHANGE UP (ref 0–1)
BILIRUB INDIRECT FLD-MCNC: >0.1 MG/DL — SIGNIFICANT CHANGE UP (ref 0–1)
BILIRUB INDIRECT FLD-MCNC: >0.2 MG/DL — SIGNIFICANT CHANGE UP (ref 0–1)
BILIRUB INDIRECT FLD-MCNC: >0.3 MG/DL — SIGNIFICANT CHANGE UP (ref 0–1)
BILIRUB INDIRECT FLD-MCNC: >0.3 MG/DL — SIGNIFICANT CHANGE UP (ref 0–1)
BILIRUB INDIRECT FLD-MCNC: >0.4 MG/DL — SIGNIFICANT CHANGE UP (ref 0–1)
BILIRUB INDIRECT FLD-MCNC: SIGNIFICANT CHANGE UP MG/DL (ref 0–1)
BILIRUB SERPL-MCNC: 0.2 MG/DL — SIGNIFICANT CHANGE UP (ref 0.2–1.2)
BILIRUB SERPL-MCNC: 0.3 MG/DL — SIGNIFICANT CHANGE UP (ref 0.2–1.2)
BILIRUB SERPL-MCNC: 0.4 MG/DL — SIGNIFICANT CHANGE UP (ref 0.2–1.2)
BILIRUB SERPL-MCNC: 0.5 MG/DL — SIGNIFICANT CHANGE UP (ref 0.2–1.2)
BILIRUB SERPL-MCNC: 0.6 MG/DL — SIGNIFICANT CHANGE UP (ref 0.2–1.2)
BILIRUB SERPL-MCNC: <0.2 MG/DL — SIGNIFICANT CHANGE UP (ref 0.2–1.2)
BILIRUB SERPL-MCNC: <0.2 MG/DL — SIGNIFICANT CHANGE UP (ref 0.2–1.2)
BILIRUB UR-MCNC: NEGATIVE — SIGNIFICANT CHANGE UP
BLD GP AB SCN SERPL QL: NEGATIVE — SIGNIFICANT CHANGE UP
BLOOD GAS ARTERIAL - LYTES,HGB,ICA,LACT RESULT: SIGNIFICANT CHANGE UP
BLOOD GAS ARTERIAL COMPREHENSIVE RESULT: SIGNIFICANT CHANGE UP
BUN SERPL-MCNC: 23 MG/DL — SIGNIFICANT CHANGE UP (ref 7–23)
BUN SERPL-MCNC: 24 MG/DL — HIGH (ref 7–23)
BUN SERPL-MCNC: 25 MG/DL — HIGH (ref 7–23)
BUN SERPL-MCNC: 26 MG/DL — HIGH (ref 7–23)
BUN SERPL-MCNC: 26 MG/DL — HIGH (ref 7–23)
BUN SERPL-MCNC: 27 MG/DL — HIGH (ref 7–23)
BUN SERPL-MCNC: 28 MG/DL — HIGH (ref 7–23)
BUN SERPL-MCNC: 29 MG/DL — HIGH (ref 7–23)
BUN SERPL-MCNC: 30 MG/DL — HIGH (ref 7–23)
BUN SERPL-MCNC: 32 MG/DL — HIGH (ref 7–23)
BUN SERPL-MCNC: 32 MG/DL — HIGH (ref 7–23)
BUN SERPL-MCNC: 34 MG/DL — HIGH (ref 7–23)
CA-I BLDA-SCNC: 1.08 MMOL/L — LOW (ref 1.15–1.33)
CALCIUM SERPL-MCNC: 7.8 MG/DL — LOW (ref 8.4–10.5)
CALCIUM SERPL-MCNC: 7.9 MG/DL — LOW (ref 8.4–10.5)
CALCIUM SERPL-MCNC: 8 MG/DL — LOW (ref 8.4–10.5)
CALCIUM SERPL-MCNC: 8.2 MG/DL — LOW (ref 8.4–10.5)
CALCIUM SERPL-MCNC: 8.3 MG/DL — LOW (ref 8.4–10.5)
CALCIUM SERPL-MCNC: 8.5 MG/DL — SIGNIFICANT CHANGE UP (ref 8.4–10.5)
CALCIUM SERPL-MCNC: 8.5 MG/DL — SIGNIFICANT CHANGE UP (ref 8.4–10.5)
CALCIUM SERPL-MCNC: 8.6 MG/DL — SIGNIFICANT CHANGE UP (ref 8.4–10.5)
CALCIUM SERPL-MCNC: 8.6 MG/DL — SIGNIFICANT CHANGE UP (ref 8.4–10.5)
CALCIUM SERPL-MCNC: 8.9 MG/DL — SIGNIFICANT CHANGE UP (ref 8.4–10.5)
CALCIUM SERPL-MCNC: 9 MG/DL — SIGNIFICANT CHANGE UP (ref 8.4–10.5)
CALCIUM SERPL-MCNC: 9 MG/DL — SIGNIFICANT CHANGE UP (ref 8.4–10.5)
CALCIUM SERPL-MCNC: 9.1 MG/DL — SIGNIFICANT CHANGE UP (ref 8.4–10.5)
CALCIUM SERPL-MCNC: 9.1 MG/DL — SIGNIFICANT CHANGE UP (ref 8.4–10.5)
CALCIUM SERPL-MCNC: 9.2 MG/DL — SIGNIFICANT CHANGE UP (ref 8.4–10.5)
CALCIUM SERPL-MCNC: 9.3 MG/DL — SIGNIFICANT CHANGE UP (ref 8.4–10.5)
CALCIUM SERPL-MCNC: 9.3 MG/DL — SIGNIFICANT CHANGE UP (ref 8.4–10.5)
CALCIUM SERPL-MCNC: 9.4 MG/DL — SIGNIFICANT CHANGE UP (ref 8.4–10.5)
CAST: 1 /LPF — SIGNIFICANT CHANGE UP (ref 0–4)
CAST: 1 /LPF — SIGNIFICANT CHANGE UP (ref 0–4)
CAST: 11 /LPF — HIGH (ref 0–4)
CAST: 12 /LPF — HIGH (ref 0–4)
CAST: 2 /LPF — SIGNIFICANT CHANGE UP (ref 0–4)
CHLORIDE SERPL-SCNC: 100 MMOL/L — SIGNIFICANT CHANGE UP (ref 98–107)
CHLORIDE SERPL-SCNC: 100 MMOL/L — SIGNIFICANT CHANGE UP (ref 98–107)
CHLORIDE SERPL-SCNC: 101 MMOL/L — SIGNIFICANT CHANGE UP (ref 98–107)
CHLORIDE SERPL-SCNC: 102 MMOL/L — SIGNIFICANT CHANGE UP (ref 98–107)
CHLORIDE SERPL-SCNC: 103 MMOL/L — SIGNIFICANT CHANGE UP (ref 98–107)
CHLORIDE SERPL-SCNC: 104 MMOL/L — SIGNIFICANT CHANGE UP (ref 98–107)
CHLORIDE SERPL-SCNC: 106 MMOL/L — SIGNIFICANT CHANGE UP (ref 98–107)
CHLORIDE SERPL-SCNC: 107 MMOL/L — SIGNIFICANT CHANGE UP (ref 98–107)
CHLORIDE SERPL-SCNC: 96 MMOL/L — LOW (ref 98–107)
CHLORIDE SERPL-SCNC: 96 MMOL/L — LOW (ref 98–107)
CHLORIDE SERPL-SCNC: 97 MMOL/L — LOW (ref 98–107)
CHLORIDE SERPL-SCNC: 98 MMOL/L — SIGNIFICANT CHANGE UP (ref 98–107)
CHOLEST SERPL-MCNC: 80 MG/DL — SIGNIFICANT CHANGE UP
CK MB BLD-MCNC: 0.5 % — SIGNIFICANT CHANGE UP (ref 0–2.5)
CK MB BLD-MCNC: 1 % — SIGNIFICANT CHANGE UP (ref 0–2.5)
CK MB BLD-MCNC: 1 % — SIGNIFICANT CHANGE UP (ref 0–2.5)
CK MB BLD-MCNC: 1.1 % — SIGNIFICANT CHANGE UP (ref 0–2.5)
CK MB BLD-MCNC: 1.2 % — SIGNIFICANT CHANGE UP (ref 0–2.5)
CK MB BLD-MCNC: 4 % — HIGH (ref 0–2.5)
CK MB CFR SERPL CALC: 1.4 NG/ML — SIGNIFICANT CHANGE UP
CK MB CFR SERPL CALC: 1.5 NG/ML — SIGNIFICANT CHANGE UP
CK MB CFR SERPL CALC: 162.5 NG/ML — HIGH
CK MB CFR SERPL CALC: 2 NG/ML — SIGNIFICANT CHANGE UP
CK MB CFR SERPL CALC: 21.2 NG/ML — HIGH
CK MB CFR SERPL CALC: 78.6 NG/ML — HIGH
CK SERPL-CCNC: 142 U/L — SIGNIFICANT CHANGE UP (ref 25–170)
CK SERPL-CCNC: 148 U/L — SIGNIFICANT CHANGE UP (ref 25–170)
CK SERPL-CCNC: 187 U/L — HIGH (ref 25–170)
CK SERPL-CCNC: 4029 U/L — HIGH (ref 25–170)
CK SERPL-CCNC: 4553 U/L — HIGH (ref 25–170)
CK SERPL-CCNC: 6355 U/L — HIGH (ref 25–170)
CO2 BLDA-SCNC: 19 MMOL/L — SIGNIFICANT CHANGE UP (ref 19–24)
CO2 BLDV-SCNC: 21 MMOL/L — LOW (ref 22–26)
CO2 SERPL-SCNC: 14 MMOL/L — LOW (ref 22–31)
CO2 SERPL-SCNC: 15 MMOL/L — LOW (ref 22–31)
CO2 SERPL-SCNC: 18 MMOL/L — LOW (ref 22–31)
CO2 SERPL-SCNC: 19 MMOL/L — LOW (ref 22–31)
CO2 SERPL-SCNC: 21 MMOL/L — LOW (ref 22–31)
CO2 SERPL-SCNC: 21 MMOL/L — LOW (ref 22–31)
CO2 SERPL-SCNC: 22 MMOL/L — SIGNIFICANT CHANGE UP (ref 22–31)
CO2 SERPL-SCNC: 23 MMOL/L — SIGNIFICANT CHANGE UP (ref 22–31)
CO2 SERPL-SCNC: 24 MMOL/L — SIGNIFICANT CHANGE UP (ref 22–31)
CO2 SERPL-SCNC: 25 MMOL/L — SIGNIFICANT CHANGE UP (ref 22–31)
CO2 SERPL-SCNC: 26 MMOL/L — SIGNIFICANT CHANGE UP (ref 22–31)
CO2 SERPL-SCNC: 27 MMOL/L — SIGNIFICANT CHANGE UP (ref 22–31)
COARSE GRAN CASTS #/AREA URNS AUTO: PRESENT
COLOR SPEC: YELLOW — SIGNIFICANT CHANGE UP
CREAT ?TM UR-MCNC: 86 MG/DL — SIGNIFICANT CHANGE UP
CREAT SERPL-MCNC: 1.18 MG/DL — SIGNIFICANT CHANGE UP (ref 0.5–1.3)
CREAT SERPL-MCNC: 1.2 MG/DL — SIGNIFICANT CHANGE UP (ref 0.5–1.3)
CREAT SERPL-MCNC: 1.2 MG/DL — SIGNIFICANT CHANGE UP (ref 0.5–1.3)
CREAT SERPL-MCNC: 1.21 MG/DL — SIGNIFICANT CHANGE UP (ref 0.5–1.3)
CREAT SERPL-MCNC: 1.25 MG/DL — SIGNIFICANT CHANGE UP (ref 0.5–1.3)
CREAT SERPL-MCNC: 1.25 MG/DL — SIGNIFICANT CHANGE UP (ref 0.5–1.3)
CREAT SERPL-MCNC: 1.3 MG/DL — SIGNIFICANT CHANGE UP (ref 0.5–1.3)
CREAT SERPL-MCNC: 1.32 MG/DL — HIGH (ref 0.5–1.3)
CREAT SERPL-MCNC: 1.33 MG/DL — HIGH (ref 0.5–1.3)
CREAT SERPL-MCNC: 1.33 MG/DL — HIGH (ref 0.5–1.3)
CREAT SERPL-MCNC: 1.38 MG/DL — HIGH (ref 0.5–1.3)
CREAT SERPL-MCNC: 1.39 MG/DL — HIGH (ref 0.5–1.3)
CREAT SERPL-MCNC: 1.4 MG/DL — HIGH (ref 0.5–1.3)
CREAT SERPL-MCNC: 1.4 MG/DL — HIGH (ref 0.5–1.3)
CREAT SERPL-MCNC: 1.43 MG/DL — HIGH (ref 0.5–1.3)
CREAT SERPL-MCNC: 1.44 MG/DL — HIGH (ref 0.5–1.3)
CREAT SERPL-MCNC: 1.47 MG/DL — HIGH (ref 0.5–1.3)
CREAT SERPL-MCNC: 1.51 MG/DL — HIGH (ref 0.5–1.3)
CREAT SERPL-MCNC: 1.51 MG/DL — HIGH (ref 0.5–1.3)
CREAT SERPL-MCNC: 1.52 MG/DL — HIGH (ref 0.5–1.3)
CREAT SERPL-MCNC: 1.56 MG/DL — HIGH (ref 0.5–1.3)
CREAT SERPL-MCNC: 1.57 MG/DL — HIGH (ref 0.5–1.3)
CREAT SERPL-MCNC: 1.61 MG/DL — HIGH (ref 0.5–1.3)
CREAT SERPL-MCNC: 1.85 MG/DL — HIGH (ref 0.5–1.3)
CREAT SERPL-MCNC: 1.89 MG/DL — HIGH (ref 0.5–1.3)
CULTURE RESULTS: NO GROWTH — SIGNIFICANT CHANGE UP
CULTURE RESULTS: NO GROWTH — SIGNIFICANT CHANGE UP
CULTURE RESULTS: SIGNIFICANT CHANGE UP
DIFF PNL FLD: ABNORMAL
DIFF PNL FLD: NEGATIVE — SIGNIFICANT CHANGE UP
EGFR: 33 ML/MIN/1.73M2 — LOW
EGFR: 33 ML/MIN/1.73M2 — LOW
EGFR: 34 ML/MIN/1.73M2 — LOW
EGFR: 34 ML/MIN/1.73M2 — LOW
EGFR: 40 ML/MIN/1.73M2 — LOW
EGFR: 40 ML/MIN/1.73M2 — LOW
EGFR: 42 ML/MIN/1.73M2 — LOW
EGFR: 43 ML/MIN/1.73M2 — LOW
EGFR: 43 ML/MIN/1.73M2 — LOW
EGFR: 44 ML/MIN/1.73M2 — LOW
EGFR: 45 ML/MIN/1.73M2 — LOW
EGFR: 45 ML/MIN/1.73M2 — LOW
EGFR: 46 ML/MIN/1.73M2 — LOW
EGFR: 46 ML/MIN/1.73M2 — LOW
EGFR: 47 ML/MIN/1.73M2 — LOW
EGFR: 47 ML/MIN/1.73M2 — LOW
EGFR: 48 ML/MIN/1.73M2 — LOW
EGFR: 49 ML/MIN/1.73M2 — LOW
EGFR: 49 ML/MIN/1.73M2 — LOW
EGFR: 51 ML/MIN/1.73M2 — LOW
EGFR: 52 ML/MIN/1.73M2 — LOW
EGFR: 52 ML/MIN/1.73M2 — LOW
EGFR: 55 ML/MIN/1.73M2 — LOW
EGFR: 57 ML/MIN/1.73M2 — LOW
EGFR: 57 ML/MIN/1.73M2 — LOW
EGFR: 58 ML/MIN/1.73M2 — LOW
EGFR: 59 ML/MIN/1.73M2 — LOW
EGFR: 59 ML/MIN/1.73M2 — LOW
EOSINOPHIL # BLD AUTO: 0.01 K/UL — SIGNIFICANT CHANGE UP (ref 0–0.5)
EOSINOPHIL # BLD AUTO: 0.01 K/UL — SIGNIFICANT CHANGE UP (ref 0–0.5)
EOSINOPHIL # BLD AUTO: 0.02 K/UL — SIGNIFICANT CHANGE UP (ref 0–0.5)
EOSINOPHIL # BLD AUTO: 0.16 K/UL — SIGNIFICANT CHANGE UP (ref 0–0.5)
EOSINOPHIL # BLD AUTO: 0.22 K/UL — SIGNIFICANT CHANGE UP (ref 0–0.5)
EOSINOPHIL # BLD AUTO: 0.23 K/UL — SIGNIFICANT CHANGE UP (ref 0–0.5)
EOSINOPHIL # BLD AUTO: 0.24 K/UL — SIGNIFICANT CHANGE UP (ref 0–0.5)
EOSINOPHIL # BLD AUTO: 0.25 K/UL — SIGNIFICANT CHANGE UP (ref 0–0.5)
EOSINOPHIL # BLD AUTO: 0.25 K/UL — SIGNIFICANT CHANGE UP (ref 0–0.5)
EOSINOPHIL # BLD AUTO: 0.26 K/UL — SIGNIFICANT CHANGE UP (ref 0–0.5)
EOSINOPHIL # BLD AUTO: 0.27 K/UL — SIGNIFICANT CHANGE UP (ref 0–0.5)
EOSINOPHIL # BLD AUTO: 0.28 K/UL — SIGNIFICANT CHANGE UP (ref 0–0.5)
EOSINOPHIL # BLD AUTO: 0.29 K/UL — SIGNIFICANT CHANGE UP (ref 0–0.5)
EOSINOPHIL # BLD AUTO: 0.33 K/UL — SIGNIFICANT CHANGE UP (ref 0–0.5)
EOSINOPHIL # BLD AUTO: 0.4 K/UL — SIGNIFICANT CHANGE UP (ref 0–0.5)
EOSINOPHIL NFR BLD AUTO: 0 % — SIGNIFICANT CHANGE UP (ref 0–6)
EOSINOPHIL NFR BLD AUTO: 0 % — SIGNIFICANT CHANGE UP (ref 0–6)
EOSINOPHIL NFR BLD AUTO: 0.1 % — SIGNIFICANT CHANGE UP (ref 0–6)
EOSINOPHIL NFR BLD AUTO: 0.9 % — SIGNIFICANT CHANGE UP (ref 0–6)
EOSINOPHIL NFR BLD AUTO: 1.5 % — SIGNIFICANT CHANGE UP (ref 0–6)
EOSINOPHIL NFR BLD AUTO: 1.7 % — SIGNIFICANT CHANGE UP (ref 0–6)
EOSINOPHIL NFR BLD AUTO: 1.8 % — SIGNIFICANT CHANGE UP (ref 0–6)
EOSINOPHIL NFR BLD AUTO: 2 % — SIGNIFICANT CHANGE UP (ref 0–6)
EOSINOPHIL NFR BLD AUTO: 2 % — SIGNIFICANT CHANGE UP (ref 0–6)
EOSINOPHIL NFR BLD AUTO: 2.1 % — SIGNIFICANT CHANGE UP (ref 0–6)
EOSINOPHIL NFR BLD AUTO: 2.2 % — SIGNIFICANT CHANGE UP (ref 0–6)
EOSINOPHIL NFR BLD AUTO: 2.2 % — SIGNIFICANT CHANGE UP (ref 0–6)
EOSINOPHIL NFR BLD AUTO: 2.3 % — SIGNIFICANT CHANGE UP (ref 0–6)
EOSINOPHIL NFR BLD AUTO: 2.3 % — SIGNIFICANT CHANGE UP (ref 0–6)
EOSINOPHIL NFR BLD AUTO: 2.6 % — SIGNIFICANT CHANGE UP (ref 0–6)
ESTIMATED AVERAGE GLUCOSE: 105 — SIGNIFICANT CHANGE UP
FIBRINOGEN AG PPP IA-MCNC: 782 MG/DL — HIGH (ref 206–478)
FINE GRAN CASTS #/AREA URNS AUTO: PRESENT
FOLATE SERPL-MCNC: 15.7 NG/ML — SIGNIFICANT CHANGE UP (ref 3.1–17.5)
GAS PNL BLDV: SIGNIFICANT CHANGE UP
GIANT PLATELETS BLD QL SMEAR: PRESENT — SIGNIFICANT CHANGE UP
GIANT PLATELETS BLD QL SMEAR: PRESENT — SIGNIFICANT CHANGE UP
GLUCOSE BLDA-MCNC: 219 MG/DL — HIGH (ref 70–99)
GLUCOSE SERPL-MCNC: 106 MG/DL — HIGH (ref 70–99)
GLUCOSE SERPL-MCNC: 106 MG/DL — HIGH (ref 70–99)
GLUCOSE SERPL-MCNC: 112 MG/DL — HIGH (ref 70–99)
GLUCOSE SERPL-MCNC: 116 MG/DL — HIGH (ref 70–99)
GLUCOSE SERPL-MCNC: 117 MG/DL — HIGH (ref 70–99)
GLUCOSE SERPL-MCNC: 120 MG/DL — HIGH (ref 70–99)
GLUCOSE SERPL-MCNC: 124 MG/DL — HIGH (ref 70–99)
GLUCOSE SERPL-MCNC: 125 MG/DL — HIGH (ref 70–99)
GLUCOSE SERPL-MCNC: 127 MG/DL — HIGH (ref 70–99)
GLUCOSE SERPL-MCNC: 129 MG/DL — HIGH (ref 70–99)
GLUCOSE SERPL-MCNC: 129 MG/DL — HIGH (ref 70–99)
GLUCOSE SERPL-MCNC: 131 MG/DL — HIGH (ref 70–99)
GLUCOSE SERPL-MCNC: 132 MG/DL — HIGH (ref 70–99)
GLUCOSE SERPL-MCNC: 134 MG/DL — HIGH (ref 70–99)
GLUCOSE SERPL-MCNC: 137 MG/DL — HIGH (ref 70–99)
GLUCOSE SERPL-MCNC: 138 MG/DL — HIGH (ref 70–99)
GLUCOSE SERPL-MCNC: 138 MG/DL — HIGH (ref 70–99)
GLUCOSE SERPL-MCNC: 139 MG/DL — HIGH (ref 70–99)
GLUCOSE SERPL-MCNC: 148 MG/DL — HIGH (ref 70–99)
GLUCOSE SERPL-MCNC: 168 MG/DL — HIGH (ref 70–99)
GLUCOSE SERPL-MCNC: 173 MG/DL — HIGH (ref 70–99)
GLUCOSE SERPL-MCNC: 198 MG/DL — HIGH (ref 70–99)
GLUCOSE SERPL-MCNC: 207 MG/DL — HIGH (ref 70–99)
GLUCOSE SERPL-MCNC: 89 MG/DL — SIGNIFICANT CHANGE UP (ref 70–99)
GLUCOSE SERPL-MCNC: 91 MG/DL — SIGNIFICANT CHANGE UP (ref 70–99)
GLUCOSE UR QL: 250 MG/DL
GLUCOSE UR QL: NEGATIVE MG/DL — SIGNIFICANT CHANGE UP
GRAM STN FLD: SIGNIFICANT CHANGE UP
HCG SERPL-ACNC: <1 MIU/ML — SIGNIFICANT CHANGE UP
HCO3 BLDA-SCNC: 18 MMOL/L — LOW (ref 21–28)
HCO3 BLDV-SCNC: 20 MMOL/L — LOW (ref 22–29)
HCT VFR BLD CALC: 20.4 % — CRITICAL LOW (ref 34.5–45)
HCT VFR BLD CALC: 21.8 % — LOW (ref 34.5–45)
HCT VFR BLD CALC: 21.9 % — LOW (ref 34.5–45)
HCT VFR BLD CALC: 22 % — LOW (ref 34.5–45)
HCT VFR BLD CALC: 22.4 % — LOW (ref 34.5–45)
HCT VFR BLD CALC: 22.5 % — LOW (ref 34.5–45)
HCT VFR BLD CALC: 22.5 % — LOW (ref 34.5–45)
HCT VFR BLD CALC: 22.8 % — LOW (ref 34.5–45)
HCT VFR BLD CALC: 22.8 % — LOW (ref 34.5–45)
HCT VFR BLD CALC: 22.9 % — LOW (ref 34.5–45)
HCT VFR BLD CALC: 23 % — LOW (ref 34.5–45)
HCT VFR BLD CALC: 23.5 % — LOW (ref 34.5–45)
HCT VFR BLD CALC: 23.6 % — LOW (ref 34.5–45)
HCT VFR BLD CALC: 24.7 % — LOW (ref 34.5–45)
HCT VFR BLD CALC: 24.8 % — LOW (ref 34.5–45)
HCT VFR BLD CALC: 25.6 % — LOW (ref 34.5–45)
HCT VFR BLD CALC: 25.6 % — LOW (ref 34.5–45)
HCT VFR BLD CALC: 25.8 % — LOW (ref 34.5–45)
HCT VFR BLD CALC: 26.5 % — LOW (ref 34.5–45)
HCT VFR BLD CALC: 26.6 % — LOW (ref 34.5–45)
HCT VFR BLD CALC: 27.4 % — LOW (ref 34.5–45)
HCT VFR BLD CALC: 27.9 % — LOW (ref 34.5–45)
HCT VFR BLD CALC: 29.2 % — LOW (ref 34.5–45)
HCT VFR BLD CALC: 31.4 % — LOW (ref 34.5–45)
HCT VFR BLD CALC: 35.1 % — SIGNIFICANT CHANGE UP (ref 34.5–45)
HCT VFR BLDA CALC: 31 % — SIGNIFICANT CHANGE UP
HCT VFR BLDA CALC: 35 % — SIGNIFICANT CHANGE UP
HDLC SERPL-MCNC: 46 MG/DL — LOW
HGB BLD CALC-MCNC: 10.2 G/DL — LOW (ref 11.7–16.1)
HGB BLD-MCNC: 10.3 G/DL — LOW (ref 11.5–15.5)
HGB BLD-MCNC: 10.8 G/DL — LOW (ref 11.5–15.5)
HGB BLD-MCNC: 6.7 G/DL — CRITICAL LOW (ref 11.5–15.5)
HGB BLD-MCNC: 7 G/DL — CRITICAL LOW (ref 11.5–15.5)
HGB BLD-MCNC: 7 G/DL — CRITICAL LOW (ref 11.5–15.5)
HGB BLD-MCNC: 7.1 G/DL — LOW (ref 11.5–15.5)
HGB BLD-MCNC: 7.2 G/DL — LOW (ref 11.5–15.5)
HGB BLD-MCNC: 7.3 G/DL — LOW (ref 11.5–15.5)
HGB BLD-MCNC: 7.4 G/DL — LOW (ref 11.5–15.5)
HGB BLD-MCNC: 7.5 G/DL — LOW (ref 11.5–15.5)
HGB BLD-MCNC: 8 G/DL — LOW (ref 11.5–15.5)
HGB BLD-MCNC: 8.1 G/DL — LOW (ref 11.5–15.5)
HGB BLD-MCNC: 8.2 G/DL — LOW (ref 11.5–15.5)
HGB BLD-MCNC: 8.4 G/DL — LOW (ref 11.5–15.5)
HGB BLD-MCNC: 8.5 G/DL — LOW (ref 11.5–15.5)
HGB BLD-MCNC: 8.7 G/DL — LOW (ref 11.5–15.5)
HGB BLD-MCNC: 8.8 G/DL — LOW (ref 11.5–15.5)
HGB BLD-MCNC: 9 G/DL — LOW (ref 11.5–15.5)
HGB BLD-MCNC: 9 G/DL — LOW (ref 11.5–15.5)
HGB BLD-MCNC: 9.8 G/DL — LOW (ref 11.5–15.5)
HGB BLDA-MCNC: 11.7 G/DL — SIGNIFICANT CHANGE UP (ref 11.7–16.1)
HYPOCHROMIA BLD QL: SLIGHT — SIGNIFICANT CHANGE UP
HYPOCHROMIA BLD QL: SLIGHT — SIGNIFICANT CHANGE UP
IANC: 10.01 K/UL — HIGH (ref 1.8–7.4)
IANC: 10.34 K/UL — HIGH (ref 1.8–7.4)
IANC: 10.38 K/UL — HIGH (ref 1.8–7.4)
IANC: 10.43 K/UL — HIGH (ref 1.8–7.4)
IANC: 10.48 K/UL — HIGH (ref 1.8–7.4)
IANC: 11.1 K/UL — HIGH (ref 1.8–7.4)
IANC: 11.66 K/UL — HIGH (ref 1.8–7.4)
IANC: 13.33 K/UL — HIGH (ref 1.8–7.4)
IANC: 15.34 K/UL — HIGH (ref 1.8–7.4)
IANC: 17.96 K/UL — HIGH (ref 1.8–7.4)
IANC: 18.04 K/UL — HIGH (ref 1.8–7.4)
IANC: 8.64 K/UL — HIGH (ref 1.8–7.4)
IANC: 8.99 K/UL — HIGH (ref 1.8–7.4)
IANC: 9.09 K/UL — HIGH (ref 1.8–7.4)
IANC: 9.38 K/UL — HIGH (ref 1.8–7.4)
IANC: 9.81 K/UL — HIGH (ref 1.8–7.4)
IANC: 9.82 K/UL — HIGH (ref 1.8–7.4)
IMM GRANULOCYTES NFR BLD AUTO: 0.7 % — SIGNIFICANT CHANGE UP (ref 0–0.9)
IMM GRANULOCYTES NFR BLD AUTO: 0.9 % — SIGNIFICANT CHANGE UP (ref 0–0.9)
IMM GRANULOCYTES NFR BLD AUTO: 1 % — HIGH (ref 0–0.9)
IMM GRANULOCYTES NFR BLD AUTO: 1.5 % — HIGH (ref 0–0.9)
IMM GRANULOCYTES NFR BLD AUTO: 1.9 % — HIGH (ref 0–0.9)
IMM GRANULOCYTES NFR BLD AUTO: 2 % — HIGH (ref 0–0.9)
IMM GRANULOCYTES NFR BLD AUTO: 2.2 % — HIGH (ref 0–0.9)
IMM GRANULOCYTES NFR BLD AUTO: 2.2 % — HIGH (ref 0–0.9)
IMM GRANULOCYTES NFR BLD AUTO: 2.3 % — HIGH (ref 0–0.9)
IMM GRANULOCYTES NFR BLD AUTO: 2.4 % — HIGH (ref 0–0.9)
IMM GRANULOCYTES NFR BLD AUTO: 2.4 % — HIGH (ref 0–0.9)
IMM GRANULOCYTES NFR BLD AUTO: 2.8 % — HIGH (ref 0–0.9)
IMM GRANULOCYTES NFR BLD AUTO: 2.8 % — HIGH (ref 0–0.9)
INR BLD: 0.99 RATIO — SIGNIFICANT CHANGE UP (ref 0.85–1.16)
INR BLD: 1.02 RATIO — SIGNIFICANT CHANGE UP (ref 0.85–1.16)
INR BLD: 1.03 RATIO — SIGNIFICANT CHANGE UP (ref 0.85–1.16)
INR BLD: 1.05 RATIO — SIGNIFICANT CHANGE UP (ref 0.85–1.16)
INR BLD: 1.05 RATIO — SIGNIFICANT CHANGE UP (ref 0.85–1.16)
INR BLD: 1.06 RATIO — SIGNIFICANT CHANGE UP (ref 0.85–1.16)
INR BLD: 1.07 RATIO — SIGNIFICANT CHANGE UP (ref 0.85–1.16)
INR BLD: 1.08 RATIO — SIGNIFICANT CHANGE UP (ref 0.85–1.16)
INR BLD: 1.08 RATIO — SIGNIFICANT CHANGE UP (ref 0.85–1.16)
INR BLD: 1.09 RATIO — SIGNIFICANT CHANGE UP (ref 0.85–1.16)
INR BLD: 1.1 RATIO — SIGNIFICANT CHANGE UP (ref 0.85–1.16)
INR BLD: 1.14 RATIO — SIGNIFICANT CHANGE UP (ref 0.85–1.16)
INR BLD: 1.14 RATIO — SIGNIFICANT CHANGE UP (ref 0.85–1.16)
INR BLD: 1.15 RATIO — SIGNIFICANT CHANGE UP (ref 0.85–1.16)
INR BLD: 1.19 RATIO — HIGH (ref 0.85–1.16)
KETONES UR-MCNC: NEGATIVE MG/DL — SIGNIFICANT CHANGE UP
LACTATE BLDA-MCNC: 3.3 MMOL/L — HIGH (ref 0.5–2)
LACTATE SERPL-SCNC: 3.2 MMOL/L — HIGH (ref 0.5–2)
LDLC SERPL-MCNC: 20 MG/DL — SIGNIFICANT CHANGE UP
LEUKOCYTE ESTERASE UR-ACNC: ABNORMAL
LEUKOCYTE ESTERASE UR-ACNC: NEGATIVE — SIGNIFICANT CHANGE UP
LIDOCAIN IGE QN: 13 U/L — SIGNIFICANT CHANGE UP (ref 7–60)
LIDOCAIN IGE QN: 13 U/L — SIGNIFICANT CHANGE UP (ref 7–60)
LIDOCAIN IGE QN: 14 U/L — SIGNIFICANT CHANGE UP (ref 7–60)
LIDOCAIN IGE QN: 15 U/L — SIGNIFICANT CHANGE UP (ref 7–60)
LIDOCAIN IGE QN: 16 U/L — SIGNIFICANT CHANGE UP (ref 7–60)
LIDOCAIN IGE QN: 17 U/L — SIGNIFICANT CHANGE UP (ref 7–60)
LIDOCAIN IGE QN: 17 U/L — SIGNIFICANT CHANGE UP (ref 7–60)
LIDOCAIN IGE QN: 19 U/L — SIGNIFICANT CHANGE UP (ref 7–60)
LIDOCAIN IGE QN: 19 U/L — SIGNIFICANT CHANGE UP (ref 7–60)
LIDOCAIN IGE QN: 20 U/L — SIGNIFICANT CHANGE UP (ref 7–60)
LIDOCAIN IGE QN: 21 U/L — SIGNIFICANT CHANGE UP (ref 7–60)
LIDOCAIN IGE QN: 21 U/L — SIGNIFICANT CHANGE UP (ref 7–60)
LIPID PNL WITH DIRECT LDL SERPL: 20 MG/DL — SIGNIFICANT CHANGE UP
LYMPHOCYTES # BLD AUTO: 0.84 K/UL — LOW (ref 1–3.3)
LYMPHOCYTES # BLD AUTO: 1.06 K/UL — SIGNIFICANT CHANGE UP (ref 1–3.3)
LYMPHOCYTES # BLD AUTO: 1.26 K/UL — SIGNIFICANT CHANGE UP (ref 1–3.3)
LYMPHOCYTES # BLD AUTO: 1.27 K/UL — SIGNIFICANT CHANGE UP (ref 1–3.3)
LYMPHOCYTES # BLD AUTO: 1.29 K/UL — SIGNIFICANT CHANGE UP (ref 1–3.3)
LYMPHOCYTES # BLD AUTO: 1.32 K/UL — SIGNIFICANT CHANGE UP (ref 1–3.3)
LYMPHOCYTES # BLD AUTO: 1.35 K/UL — SIGNIFICANT CHANGE UP (ref 1–3.3)
LYMPHOCYTES # BLD AUTO: 1.41 K/UL — SIGNIFICANT CHANGE UP (ref 1–3.3)
LYMPHOCYTES # BLD AUTO: 1.42 K/UL — SIGNIFICANT CHANGE UP (ref 1–3.3)
LYMPHOCYTES # BLD AUTO: 1.47 K/UL — SIGNIFICANT CHANGE UP (ref 1–3.3)
LYMPHOCYTES # BLD AUTO: 1.48 K/UL — SIGNIFICANT CHANGE UP (ref 1–3.3)
LYMPHOCYTES # BLD AUTO: 1.5 K/UL — SIGNIFICANT CHANGE UP (ref 1–3.3)
LYMPHOCYTES # BLD AUTO: 1.56 K/UL — SIGNIFICANT CHANGE UP (ref 1–3.3)
LYMPHOCYTES # BLD AUTO: 1.56 K/UL — SIGNIFICANT CHANGE UP (ref 1–3.3)
LYMPHOCYTES # BLD AUTO: 1.89 K/UL — SIGNIFICANT CHANGE UP (ref 1–3.3)
LYMPHOCYTES # BLD AUTO: 10 % — LOW (ref 13–44)
LYMPHOCYTES # BLD AUTO: 10.4 % — LOW (ref 13–44)
LYMPHOCYTES # BLD AUTO: 10.7 % — LOW (ref 13–44)
LYMPHOCYTES # BLD AUTO: 10.8 % — LOW (ref 13–44)
LYMPHOCYTES # BLD AUTO: 10.8 % — LOW (ref 13–44)
LYMPHOCYTES # BLD AUTO: 11.1 % — LOW (ref 13–44)
LYMPHOCYTES # BLD AUTO: 11.2 % — LOW (ref 13–44)
LYMPHOCYTES # BLD AUTO: 11.5 % — LOW (ref 13–44)
LYMPHOCYTES # BLD AUTO: 12.9 % — LOW (ref 13–44)
LYMPHOCYTES # BLD AUTO: 15.1 % — SIGNIFICANT CHANGE UP (ref 13–44)
LYMPHOCYTES # BLD AUTO: 18.3 % — SIGNIFICANT CHANGE UP (ref 13–44)
LYMPHOCYTES # BLD AUTO: 2.19 K/UL — SIGNIFICANT CHANGE UP (ref 1–3.3)
LYMPHOCYTES # BLD AUTO: 2.84 K/UL — SIGNIFICANT CHANGE UP (ref 1–3.3)
LYMPHOCYTES # BLD AUTO: 4.2 % — LOW (ref 13–44)
LYMPHOCYTES # BLD AUTO: 7 % — LOW (ref 13–44)
LYMPHOCYTES # BLD AUTO: 8.3 % — LOW (ref 13–44)
LYMPHOCYTES # BLD AUTO: 8.9 % — LOW (ref 13–44)
LYMPHOCYTES # BLD AUTO: 9.2 % — LOW (ref 13–44)
LYMPHOCYTES # BLD AUTO: 9.3 % — LOW (ref 13–44)
MAGNESIUM SERPL-MCNC: 1.9 MG/DL — SIGNIFICANT CHANGE UP (ref 1.6–2.6)
MAGNESIUM SERPL-MCNC: 2 MG/DL — SIGNIFICANT CHANGE UP (ref 1.6–2.6)
MAGNESIUM SERPL-MCNC: 2.1 MG/DL — SIGNIFICANT CHANGE UP (ref 1.6–2.6)
MAGNESIUM SERPL-MCNC: 2.2 MG/DL — SIGNIFICANT CHANGE UP (ref 1.6–2.6)
MAGNESIUM SERPL-MCNC: 2.3 MG/DL — SIGNIFICANT CHANGE UP (ref 1.6–2.6)
MANUAL SMEAR VERIFICATION: SIGNIFICANT CHANGE UP
MCHC RBC-ENTMCNC: 25.5 PG — LOW (ref 27–34)
MCHC RBC-ENTMCNC: 25.6 PG — LOW (ref 27–34)
MCHC RBC-ENTMCNC: 25.7 PG — LOW (ref 27–34)
MCHC RBC-ENTMCNC: 25.7 PG — LOW (ref 27–34)
MCHC RBC-ENTMCNC: 25.8 PG — LOW (ref 27–34)
MCHC RBC-ENTMCNC: 25.9 PG — LOW (ref 27–34)
MCHC RBC-ENTMCNC: 25.9 PG — LOW (ref 27–34)
MCHC RBC-ENTMCNC: 26.1 PG — LOW (ref 27–34)
MCHC RBC-ENTMCNC: 26.1 PG — LOW (ref 27–34)
MCHC RBC-ENTMCNC: 26.2 PG — LOW (ref 27–34)
MCHC RBC-ENTMCNC: 26.2 PG — LOW (ref 27–34)
MCHC RBC-ENTMCNC: 26.3 PG — LOW (ref 27–34)
MCHC RBC-ENTMCNC: 26.4 PG — LOW (ref 27–34)
MCHC RBC-ENTMCNC: 26.5 PG — LOW (ref 27–34)
MCHC RBC-ENTMCNC: 26.5 PG — LOW (ref 27–34)
MCHC RBC-ENTMCNC: 26.6 PG — LOW (ref 27–34)
MCHC RBC-ENTMCNC: 26.7 PG — LOW (ref 27–34)
MCHC RBC-ENTMCNC: 26.9 PG — LOW (ref 27–34)
MCHC RBC-ENTMCNC: 27.1 PG — SIGNIFICANT CHANGE UP (ref 27–34)
MCHC RBC-ENTMCNC: 30.8 G/DL — LOW (ref 32–36)
MCHC RBC-ENTMCNC: 31.1 G/DL — LOW (ref 32–36)
MCHC RBC-ENTMCNC: 31.1 G/DL — LOW (ref 32–36)
MCHC RBC-ENTMCNC: 31.3 G/DL — LOW (ref 32–36)
MCHC RBC-ENTMCNC: 31.3 G/DL — LOW (ref 32–36)
MCHC RBC-ENTMCNC: 31.4 G/DL — LOW (ref 32–36)
MCHC RBC-ENTMCNC: 31.6 G/DL — LOW (ref 32–36)
MCHC RBC-ENTMCNC: 31.7 G/DL — LOW (ref 32–36)
MCHC RBC-ENTMCNC: 31.7 G/DL — LOW (ref 32–36)
MCHC RBC-ENTMCNC: 31.8 G/DL — LOW (ref 32–36)
MCHC RBC-ENTMCNC: 32 G/DL — SIGNIFICANT CHANGE UP (ref 32–36)
MCHC RBC-ENTMCNC: 32 G/DL — SIGNIFICANT CHANGE UP (ref 32–36)
MCHC RBC-ENTMCNC: 32.1 G/DL — SIGNIFICANT CHANGE UP (ref 32–36)
MCHC RBC-ENTMCNC: 32.3 G/DL — SIGNIFICANT CHANGE UP (ref 32–36)
MCHC RBC-ENTMCNC: 32.5 G/DL — SIGNIFICANT CHANGE UP (ref 32–36)
MCHC RBC-ENTMCNC: 32.6 G/DL — SIGNIFICANT CHANGE UP (ref 32–36)
MCHC RBC-ENTMCNC: 32.7 G/DL — SIGNIFICANT CHANGE UP (ref 32–36)
MCHC RBC-ENTMCNC: 32.8 G/DL — SIGNIFICANT CHANGE UP (ref 32–36)
MCHC RBC-ENTMCNC: 32.8 G/DL — SIGNIFICANT CHANGE UP (ref 32–36)
MCHC RBC-ENTMCNC: 33.6 G/DL — SIGNIFICANT CHANGE UP (ref 32–36)
MCHC RBC-ENTMCNC: 33.8 G/DL — SIGNIFICANT CHANGE UP (ref 32–36)
MCHC RBC-ENTMCNC: 34 G/DL — SIGNIFICANT CHANGE UP (ref 32–36)
MCHC RBC-ENTMCNC: 34.4 G/DL — SIGNIFICANT CHANGE UP (ref 32–36)
MCV RBC AUTO: 78.5 FL — LOW (ref 80–100)
MCV RBC AUTO: 78.7 FL — LOW (ref 80–100)
MCV RBC AUTO: 78.9 FL — LOW (ref 80–100)
MCV RBC AUTO: 79.3 FL — LOW (ref 80–100)
MCV RBC AUTO: 79.6 FL — LOW (ref 80–100)
MCV RBC AUTO: 79.9 FL — LOW (ref 80–100)
MCV RBC AUTO: 80.6 FL — SIGNIFICANT CHANGE UP (ref 80–100)
MCV RBC AUTO: 80.7 FL — SIGNIFICANT CHANGE UP (ref 80–100)
MCV RBC AUTO: 81 FL — SIGNIFICANT CHANGE UP (ref 80–100)
MCV RBC AUTO: 81.2 FL — SIGNIFICANT CHANGE UP (ref 80–100)
MCV RBC AUTO: 81.2 FL — SIGNIFICANT CHANGE UP (ref 80–100)
MCV RBC AUTO: 81.3 FL — SIGNIFICANT CHANGE UP (ref 80–100)
MCV RBC AUTO: 81.4 FL — SIGNIFICANT CHANGE UP (ref 80–100)
MCV RBC AUTO: 81.5 FL — SIGNIFICANT CHANGE UP (ref 80–100)
MCV RBC AUTO: 81.8 FL — SIGNIFICANT CHANGE UP (ref 80–100)
MCV RBC AUTO: 81.8 FL — SIGNIFICANT CHANGE UP (ref 80–100)
MCV RBC AUTO: 82 FL — SIGNIFICANT CHANGE UP (ref 80–100)
MCV RBC AUTO: 82.1 FL — SIGNIFICANT CHANGE UP (ref 80–100)
MCV RBC AUTO: 82.6 FL — SIGNIFICANT CHANGE UP (ref 80–100)
MCV RBC AUTO: 83 FL — SIGNIFICANT CHANGE UP (ref 80–100)
MCV RBC AUTO: 83.2 FL — SIGNIFICANT CHANGE UP (ref 80–100)
MCV RBC AUTO: 83.8 FL — SIGNIFICANT CHANGE UP (ref 80–100)
MCV RBC AUTO: 84 FL — SIGNIFICANT CHANGE UP (ref 80–100)
MCV RBC AUTO: 84.8 FL — SIGNIFICANT CHANGE UP (ref 80–100)
MCV RBC AUTO: 84.8 FL — SIGNIFICANT CHANGE UP (ref 80–100)
METAMYELOCYTES # FLD: 0.9 % — SIGNIFICANT CHANGE UP (ref 0–1)
METAMYELOCYTES # FLD: 1.8 % — HIGH (ref 0–1)
METAMYELOCYTES NFR BLD: 0.9 % — SIGNIFICANT CHANGE UP (ref 0–1)
METAMYELOCYTES NFR BLD: 1.8 % — HIGH (ref 0–1)
MONOCYTES # BLD AUTO: 0.9 K/UL — SIGNIFICANT CHANGE UP (ref 0–0.9)
MONOCYTES # BLD AUTO: 0.95 K/UL — HIGH (ref 0–0.9)
MONOCYTES # BLD AUTO: 0.97 K/UL — HIGH (ref 0–0.9)
MONOCYTES # BLD AUTO: 0.98 K/UL — HIGH (ref 0–0.9)
MONOCYTES # BLD AUTO: 0.99 K/UL — HIGH (ref 0–0.9)
MONOCYTES # BLD AUTO: 1.04 K/UL — HIGH (ref 0–0.9)
MONOCYTES # BLD AUTO: 1.06 K/UL — HIGH (ref 0–0.9)
MONOCYTES # BLD AUTO: 1.15 K/UL — HIGH (ref 0–0.9)
MONOCYTES # BLD AUTO: 1.16 K/UL — HIGH (ref 0–0.9)
MONOCYTES # BLD AUTO: 1.21 K/UL — HIGH (ref 0–0.9)
MONOCYTES # BLD AUTO: 1.21 K/UL — HIGH (ref 0–0.9)
MONOCYTES # BLD AUTO: 1.33 K/UL — HIGH (ref 0–0.9)
MONOCYTES # BLD AUTO: 1.39 K/UL — HIGH (ref 0–0.9)
MONOCYTES # BLD AUTO: 1.44 K/UL — HIGH (ref 0–0.9)
MONOCYTES # BLD AUTO: 1.52 K/UL — HIGH (ref 0–0.9)
MONOCYTES # BLD AUTO: 1.69 K/UL — HIGH (ref 0–0.9)
MONOCYTES # BLD AUTO: 1.73 K/UL — HIGH (ref 0–0.9)
MONOCYTES NFR BLD AUTO: 10.1 % — SIGNIFICANT CHANGE UP (ref 2–14)
MONOCYTES NFR BLD AUTO: 5.3 % — SIGNIFICANT CHANGE UP (ref 2–14)
MONOCYTES NFR BLD AUTO: 7.2 % — SIGNIFICANT CHANGE UP (ref 2–14)
MONOCYTES NFR BLD AUTO: 7.2 % — SIGNIFICANT CHANGE UP (ref 2–14)
MONOCYTES NFR BLD AUTO: 7.5 % — SIGNIFICANT CHANGE UP (ref 2–14)
MONOCYTES NFR BLD AUTO: 7.6 % — SIGNIFICANT CHANGE UP (ref 2–14)
MONOCYTES NFR BLD AUTO: 7.8 % — SIGNIFICANT CHANGE UP (ref 2–14)
MONOCYTES NFR BLD AUTO: 8.2 % — SIGNIFICANT CHANGE UP (ref 2–14)
MONOCYTES NFR BLD AUTO: 8.3 % — SIGNIFICANT CHANGE UP (ref 2–14)
MONOCYTES NFR BLD AUTO: 8.5 % — SIGNIFICANT CHANGE UP (ref 2–14)
MONOCYTES NFR BLD AUTO: 8.7 % — SIGNIFICANT CHANGE UP (ref 2–14)
MONOCYTES NFR BLD AUTO: 8.8 % — SIGNIFICANT CHANGE UP (ref 2–14)
MONOCYTES NFR BLD AUTO: 8.8 % — SIGNIFICANT CHANGE UP (ref 2–14)
MONOCYTES NFR BLD AUTO: 9 % — SIGNIFICANT CHANGE UP (ref 2–14)
MONOCYTES NFR BLD AUTO: 9.7 % — SIGNIFICANT CHANGE UP (ref 2–14)
MONOCYTES NFR BLD AUTO: 9.8 % — SIGNIFICANT CHANGE UP (ref 2–14)
MONOCYTES NFR BLD AUTO: 9.9 % — SIGNIFICANT CHANGE UP (ref 2–14)
MRSA PCR RESULT.: SIGNIFICANT CHANGE UP
MYELOCYTES NFR BLD: 1.8 % — HIGH (ref 0–0)
NEUTROPHILS # BLD AUTO: 10.01 K/UL — HIGH (ref 1.8–7.4)
NEUTROPHILS # BLD AUTO: 10.34 K/UL — HIGH (ref 1.8–7.4)
NEUTROPHILS # BLD AUTO: 10.38 K/UL — HIGH (ref 1.8–7.4)
NEUTROPHILS # BLD AUTO: 10.43 K/UL — HIGH (ref 1.8–7.4)
NEUTROPHILS # BLD AUTO: 10.48 K/UL — HIGH (ref 1.8–7.4)
NEUTROPHILS # BLD AUTO: 10.51 K/UL — HIGH (ref 1.8–7.4)
NEUTROPHILS # BLD AUTO: 11.1 K/UL — HIGH (ref 1.8–7.4)
NEUTROPHILS # BLD AUTO: 13.37 K/UL — HIGH (ref 1.8–7.4)
NEUTROPHILS # BLD AUTO: 15.34 K/UL — HIGH (ref 1.8–7.4)
NEUTROPHILS # BLD AUTO: 17.96 K/UL — HIGH (ref 1.8–7.4)
NEUTROPHILS # BLD AUTO: 18.04 K/UL — HIGH (ref 1.8–7.4)
NEUTROPHILS # BLD AUTO: 8.64 K/UL — HIGH (ref 1.8–7.4)
NEUTROPHILS # BLD AUTO: 8.99 K/UL — HIGH (ref 1.8–7.4)
NEUTROPHILS # BLD AUTO: 9.09 K/UL — HIGH (ref 1.8–7.4)
NEUTROPHILS # BLD AUTO: 9.38 K/UL — HIGH (ref 1.8–7.4)
NEUTROPHILS # BLD AUTO: 9.81 K/UL — HIGH (ref 1.8–7.4)
NEUTROPHILS # BLD AUTO: 9.82 K/UL — HIGH (ref 1.8–7.4)
NEUTROPHILS NFR BLD AUTO: 66.9 % — SIGNIFICANT CHANGE UP (ref 43–77)
NEUTROPHILS NFR BLD AUTO: 71 % — SIGNIFICANT CHANGE UP (ref 43–77)
NEUTROPHILS NFR BLD AUTO: 74.1 % — SIGNIFICANT CHANGE UP (ref 43–77)
NEUTROPHILS NFR BLD AUTO: 75 % — SIGNIFICANT CHANGE UP (ref 43–77)
NEUTROPHILS NFR BLD AUTO: 75.3 % — SIGNIFICANT CHANGE UP (ref 43–77)
NEUTROPHILS NFR BLD AUTO: 75.6 % — SIGNIFICANT CHANGE UP (ref 43–77)
NEUTROPHILS NFR BLD AUTO: 75.9 % — SIGNIFICANT CHANGE UP (ref 43–77)
NEUTROPHILS NFR BLD AUTO: 76.1 % — SIGNIFICANT CHANGE UP (ref 43–77)
NEUTROPHILS NFR BLD AUTO: 76.2 % — SIGNIFICANT CHANGE UP (ref 43–77)
NEUTROPHILS NFR BLD AUTO: 76.4 % — SIGNIFICANT CHANGE UP (ref 43–77)
NEUTROPHILS NFR BLD AUTO: 76.6 % — SIGNIFICANT CHANGE UP (ref 43–77)
NEUTROPHILS NFR BLD AUTO: 76.8 % — SIGNIFICANT CHANGE UP (ref 43–77)
NEUTROPHILS NFR BLD AUTO: 77.8 % — HIGH (ref 43–77)
NEUTROPHILS NFR BLD AUTO: 79 % — HIGH (ref 43–77)
NEUTROPHILS NFR BLD AUTO: 81.5 % — HIGH (ref 43–77)
NEUTROPHILS NFR BLD AUTO: 85 % — HIGH (ref 43–77)
NEUTROPHILS NFR BLD AUTO: 89.4 % — HIGH (ref 43–77)
NEUTS BAND # BLD: 0.9 % — SIGNIFICANT CHANGE UP (ref 0–6)
NEUTS BAND # BLD: 0.9 % — SIGNIFICANT CHANGE UP (ref 0–6)
NEUTS BAND NFR BLD: 0.9 % — SIGNIFICANT CHANGE UP (ref 0–6)
NEUTS BAND NFR BLD: 0.9 % — SIGNIFICANT CHANGE UP (ref 0–6)
NITRITE UR-MCNC: NEGATIVE — SIGNIFICANT CHANGE UP
NONHDLC SERPL-MCNC: 34 MG/DL — SIGNIFICANT CHANGE UP
NRBC # BLD AUTO: 0 K/UL — SIGNIFICANT CHANGE UP (ref 0–0)
NRBC # BLD AUTO: 0.02 K/UL — HIGH (ref 0–0)
NRBC # BLD AUTO: 0.03 K/UL — HIGH (ref 0–0)
NRBC # BLD AUTO: 0.04 K/UL — HIGH (ref 0–0)
NRBC # BLD AUTO: 0.04 K/UL — HIGH (ref 0–0)
NRBC # BLD AUTO: 0.05 K/UL — HIGH (ref 0–0)
NRBC # BLD AUTO: 0.06 K/UL — HIGH (ref 0–0)
NRBC # BLD: 2 /100 WBCS — HIGH (ref 0–0)
NRBC # FLD: 0 K/UL — SIGNIFICANT CHANGE UP (ref 0–0)
NRBC # FLD: 0.02 K/UL — HIGH (ref 0–0)
NRBC # FLD: 0.03 K/UL — HIGH (ref 0–0)
NRBC # FLD: 0.04 K/UL — HIGH (ref 0–0)
NRBC # FLD: 0.04 K/UL — HIGH (ref 0–0)
NRBC # FLD: 0.05 K/UL — HIGH (ref 0–0)
NRBC # FLD: 0.06 K/UL — HIGH (ref 0–0)
NRBC BLD AUTO-RTO: 0 /100 WBCS — SIGNIFICANT CHANGE UP (ref 0–0)
NRBC BLD-RTO: 2 /100 WBCS — HIGH (ref 0–0)
NSE FLD-MCNC: 160.1 NG/ML — HIGH (ref 0–17.6)
NSE FLD-MCNC: 402.3 NG/ML — HIGH (ref 0–17.6)
NSE FLD-MCNC: 62.8 NG/ML — HIGH (ref 0–17.6)
NT-PROBNP SERPL-SCNC: 1268 PG/ML — HIGH
OSMOLALITY UR: 294 MOSM/KG — SIGNIFICANT CHANGE UP (ref 50–1200)
OSMOLALITY UR: 408 MOSM/KG — SIGNIFICANT CHANGE UP (ref 50–1200)
OSMOLALITY UR: 539 MOSM/KG — SIGNIFICANT CHANGE UP (ref 50–1200)
OVALOCYTES BLD QL SMEAR: SLIGHT — SIGNIFICANT CHANGE UP
OVALOCYTES BLD QL SMEAR: SLIGHT — SIGNIFICANT CHANGE UP
PCO2 BLDA: 29 MMHG — LOW (ref 32–45)
PCO2 BLDV: 33 MMHG — LOW (ref 39–52)
PH BLDA: 7.4 — SIGNIFICANT CHANGE UP (ref 7.35–7.45)
PH BLDV: 7.4 — SIGNIFICANT CHANGE UP (ref 7.32–7.43)
PH UR: 6 — SIGNIFICANT CHANGE UP (ref 5–8)
PH UR: 6 — SIGNIFICANT CHANGE UP (ref 5–8)
PH UR: 7 — SIGNIFICANT CHANGE UP (ref 5–8)
PH UR: 7.5 — SIGNIFICANT CHANGE UP (ref 5–8)
PH UR: 7.5 — SIGNIFICANT CHANGE UP (ref 5–8)
PH UR: 8 — SIGNIFICANT CHANGE UP (ref 5–8)
PHOSPHATE SERPL-MCNC: 1.9 MG/DL — LOW (ref 2.5–4.5)
PHOSPHATE SERPL-MCNC: 1.9 MG/DL — LOW (ref 2.5–4.5)
PHOSPHATE SERPL-MCNC: 2.4 MG/DL — LOW (ref 2.5–4.5)
PHOSPHATE SERPL-MCNC: 2.5 MG/DL — SIGNIFICANT CHANGE UP (ref 2.5–4.5)
PHOSPHATE SERPL-MCNC: 2.6 MG/DL — SIGNIFICANT CHANGE UP (ref 2.5–4.5)
PHOSPHATE SERPL-MCNC: 2.7 MG/DL — SIGNIFICANT CHANGE UP (ref 2.5–4.5)
PHOSPHATE SERPL-MCNC: 2.7 MG/DL — SIGNIFICANT CHANGE UP (ref 2.5–4.5)
PHOSPHATE SERPL-MCNC: 2.8 MG/DL — SIGNIFICANT CHANGE UP (ref 2.5–4.5)
PHOSPHATE SERPL-MCNC: 2.9 MG/DL — SIGNIFICANT CHANGE UP (ref 2.5–4.5)
PHOSPHATE SERPL-MCNC: 2.9 MG/DL — SIGNIFICANT CHANGE UP (ref 2.5–4.5)
PHOSPHATE SERPL-MCNC: 3.2 MG/DL — SIGNIFICANT CHANGE UP (ref 2.5–4.5)
PHOSPHATE SERPL-MCNC: 3.8 MG/DL — SIGNIFICANT CHANGE UP (ref 2.5–4.5)
PHOSPHATE SERPL-MCNC: 4.2 MG/DL — SIGNIFICANT CHANGE UP (ref 2.5–4.5)
PHOSPHATE SERPL-MCNC: 4.2 MG/DL — SIGNIFICANT CHANGE UP (ref 2.5–4.5)
PHOSPHATE SERPL-MCNC: 4.3 MG/DL — SIGNIFICANT CHANGE UP (ref 2.5–4.5)
PHOSPHATE SERPL-MCNC: 4.9 MG/DL — HIGH (ref 2.5–4.5)
PHOSPHATE SERPL-MCNC: 5.1 MG/DL — HIGH (ref 2.5–4.5)
PHOSPHATE SERPL-MCNC: 5.1 MG/DL — HIGH (ref 2.5–4.5)
PHOSPHATE SERPL-MCNC: 5.2 MG/DL — HIGH (ref 2.5–4.5)
PHOSPHATE SERPL-MCNC: 5.3 MG/DL — HIGH (ref 2.5–4.5)
PHOSPHATE SERPL-MCNC: 5.5 MG/DL — HIGH (ref 2.5–4.5)
PHOSPHATE SERPL-MCNC: 5.5 MG/DL — HIGH (ref 2.5–4.5)
PHOSPHATE SERPL-MCNC: 5.6 MG/DL — HIGH (ref 2.5–4.5)
PHOSPHATE SERPL-MCNC: 6.1 MG/DL — HIGH (ref 2.5–4.5)
PLAT MORPH BLD: NORMAL — SIGNIFICANT CHANGE UP
PLATELET # BLD AUTO: 133 K/UL — LOW (ref 150–400)
PLATELET # BLD AUTO: 144 K/UL — LOW (ref 150–400)
PLATELET # BLD AUTO: 150 K/UL — SIGNIFICANT CHANGE UP (ref 150–400)
PLATELET # BLD AUTO: 155 K/UL — SIGNIFICANT CHANGE UP (ref 150–400)
PLATELET # BLD AUTO: 174 K/UL — SIGNIFICANT CHANGE UP (ref 150–400)
PLATELET # BLD AUTO: 179 K/UL — SIGNIFICANT CHANGE UP (ref 150–400)
PLATELET # BLD AUTO: 198 K/UL — SIGNIFICANT CHANGE UP (ref 150–400)
PLATELET # BLD AUTO: 206 K/UL — SIGNIFICANT CHANGE UP (ref 150–400)
PLATELET # BLD AUTO: 232 K/UL — SIGNIFICANT CHANGE UP (ref 150–400)
PLATELET # BLD AUTO: 292 K/UL — SIGNIFICANT CHANGE UP (ref 150–400)
PLATELET # BLD AUTO: 294 K/UL — SIGNIFICANT CHANGE UP (ref 150–400)
PLATELET # BLD AUTO: 314 K/UL — SIGNIFICANT CHANGE UP (ref 150–400)
PLATELET # BLD AUTO: 326 K/UL — SIGNIFICANT CHANGE UP (ref 150–400)
PLATELET # BLD AUTO: 326 K/UL — SIGNIFICANT CHANGE UP (ref 150–400)
PLATELET # BLD AUTO: 329 K/UL — SIGNIFICANT CHANGE UP (ref 150–400)
PLATELET # BLD AUTO: 336 K/UL — SIGNIFICANT CHANGE UP (ref 150–400)
PLATELET # BLD AUTO: 340 K/UL — SIGNIFICANT CHANGE UP (ref 150–400)
PLATELET # BLD AUTO: 353 K/UL — SIGNIFICANT CHANGE UP (ref 150–400)
PLATELET # BLD AUTO: 356 K/UL — SIGNIFICANT CHANGE UP (ref 150–400)
PLATELET # BLD AUTO: 368 K/UL — SIGNIFICANT CHANGE UP (ref 150–400)
PLATELET # BLD AUTO: 369 K/UL — SIGNIFICANT CHANGE UP (ref 150–400)
PLATELET # BLD AUTO: 370 K/UL — SIGNIFICANT CHANGE UP (ref 150–400)
PLATELET # BLD AUTO: 371 K/UL — SIGNIFICANT CHANGE UP (ref 150–400)
PLATELET # BLD AUTO: 372 K/UL — SIGNIFICANT CHANGE UP (ref 150–400)
PLATELET # BLD AUTO: 380 K/UL — SIGNIFICANT CHANGE UP (ref 150–400)
PLATELET COUNT - ESTIMATE: NORMAL — SIGNIFICANT CHANGE UP
PO2 BLDA: 183 MMHG — HIGH (ref 83–108)
PO2 BLDV: 51 MMHG — HIGH (ref 25–45)
POIKILOCYTOSIS BLD QL AUTO: SLIGHT — SIGNIFICANT CHANGE UP
POIKILOCYTOSIS BLD QL AUTO: SLIGHT — SIGNIFICANT CHANGE UP
POLYCHROMASIA BLD QL SMEAR: SLIGHT — SIGNIFICANT CHANGE UP
POLYCHROMASIA BLD QL SMEAR: SLIGHT — SIGNIFICANT CHANGE UP
POTASSIUM BLDA-SCNC: 5.1 MMOL/L — SIGNIFICANT CHANGE UP (ref 3.5–5.1)
POTASSIUM SERPL-MCNC: 3.2 MMOL/L — LOW (ref 3.5–5.3)
POTASSIUM SERPL-MCNC: 3.3 MMOL/L — LOW (ref 3.5–5.3)
POTASSIUM SERPL-MCNC: 3.5 MMOL/L — SIGNIFICANT CHANGE UP (ref 3.5–5.3)
POTASSIUM SERPL-MCNC: 3.6 MMOL/L — SIGNIFICANT CHANGE UP (ref 3.5–5.3)
POTASSIUM SERPL-MCNC: 3.6 MMOL/L — SIGNIFICANT CHANGE UP (ref 3.5–5.3)
POTASSIUM SERPL-MCNC: 3.7 MMOL/L — SIGNIFICANT CHANGE UP (ref 3.5–5.3)
POTASSIUM SERPL-MCNC: 3.8 MMOL/L — SIGNIFICANT CHANGE UP (ref 3.5–5.3)
POTASSIUM SERPL-MCNC: 3.8 MMOL/L — SIGNIFICANT CHANGE UP (ref 3.5–5.3)
POTASSIUM SERPL-MCNC: 3.9 MMOL/L — SIGNIFICANT CHANGE UP (ref 3.5–5.3)
POTASSIUM SERPL-MCNC: 4 MMOL/L — SIGNIFICANT CHANGE UP (ref 3.5–5.3)
POTASSIUM SERPL-MCNC: 4.3 MMOL/L — SIGNIFICANT CHANGE UP (ref 3.5–5.3)
POTASSIUM SERPL-MCNC: 4.4 MMOL/L — SIGNIFICANT CHANGE UP (ref 3.5–5.3)
POTASSIUM SERPL-MCNC: 4.6 MMOL/L — SIGNIFICANT CHANGE UP (ref 3.5–5.3)
POTASSIUM SERPL-SCNC: 3.2 MMOL/L — LOW (ref 3.5–5.3)
POTASSIUM SERPL-SCNC: 3.3 MMOL/L — LOW (ref 3.5–5.3)
POTASSIUM SERPL-SCNC: 3.5 MMOL/L — SIGNIFICANT CHANGE UP (ref 3.5–5.3)
POTASSIUM SERPL-SCNC: 3.6 MMOL/L — SIGNIFICANT CHANGE UP (ref 3.5–5.3)
POTASSIUM SERPL-SCNC: 3.6 MMOL/L — SIGNIFICANT CHANGE UP (ref 3.5–5.3)
POTASSIUM SERPL-SCNC: 3.7 MMOL/L — SIGNIFICANT CHANGE UP (ref 3.5–5.3)
POTASSIUM SERPL-SCNC: 3.8 MMOL/L — SIGNIFICANT CHANGE UP (ref 3.5–5.3)
POTASSIUM SERPL-SCNC: 3.8 MMOL/L — SIGNIFICANT CHANGE UP (ref 3.5–5.3)
POTASSIUM SERPL-SCNC: 3.9 MMOL/L — SIGNIFICANT CHANGE UP (ref 3.5–5.3)
POTASSIUM SERPL-SCNC: 4 MMOL/L — SIGNIFICANT CHANGE UP (ref 3.5–5.3)
POTASSIUM SERPL-SCNC: 4.3 MMOL/L — SIGNIFICANT CHANGE UP (ref 3.5–5.3)
POTASSIUM SERPL-SCNC: 4.4 MMOL/L — SIGNIFICANT CHANGE UP (ref 3.5–5.3)
POTASSIUM SERPL-SCNC: 4.6 MMOL/L — SIGNIFICANT CHANGE UP (ref 3.5–5.3)
PROCALCITONIN SERPL-MCNC: 1.62 NG/ML — HIGH (ref 0.02–0.1)
PROCALCITONIN SERPL-MCNC: 1.8 NG/ML — HIGH (ref 0.02–0.1)
PROT ?TM UR-MCNC: 467 MG/DL — SIGNIFICANT CHANGE UP
PROT SERPL-MCNC: 6.1 G/DL — SIGNIFICANT CHANGE UP (ref 6–8.3)
PROT SERPL-MCNC: 6.2 G/DL — SIGNIFICANT CHANGE UP (ref 6–8.3)
PROT SERPL-MCNC: 6.3 G/DL — SIGNIFICANT CHANGE UP (ref 6–8.3)
PROT SERPL-MCNC: 6.4 G/DL — SIGNIFICANT CHANGE UP (ref 6–8.3)
PROT SERPL-MCNC: 6.5 G/DL — SIGNIFICANT CHANGE UP (ref 6–8.3)
PROT SERPL-MCNC: 6.6 G/DL — SIGNIFICANT CHANGE UP (ref 6–8.3)
PROT SERPL-MCNC: 6.7 G/DL — SIGNIFICANT CHANGE UP (ref 6–8.3)
PROT SERPL-MCNC: 6.7 G/DL — SIGNIFICANT CHANGE UP (ref 6–8.3)
PROT SERPL-MCNC: 6.8 G/DL — SIGNIFICANT CHANGE UP (ref 6–8.3)
PROT SERPL-MCNC: 6.8 G/DL — SIGNIFICANT CHANGE UP (ref 6–8.3)
PROT SERPL-MCNC: 7 G/DL — SIGNIFICANT CHANGE UP (ref 6–8.3)
PROT UR-MCNC: 30 MG/DL
PROT UR-MCNC: 300 MG/DL
PROT UR-MCNC: NEGATIVE MG/DL — SIGNIFICANT CHANGE UP
PROTHROM AB SERPL-ACNC: 11.5 SEC — SIGNIFICANT CHANGE UP (ref 9.9–13.4)
PROTHROM AB SERPL-ACNC: 11.8 SEC — SIGNIFICANT CHANGE UP (ref 9.9–13.4)
PROTHROM AB SERPL-ACNC: 11.9 SEC — SIGNIFICANT CHANGE UP (ref 9.9–13.4)
PROTHROM AB SERPL-ACNC: 12.2 SEC — SIGNIFICANT CHANGE UP (ref 9.9–13.4)
PROTHROM AB SERPL-ACNC: 12.3 SEC — SIGNIFICANT CHANGE UP (ref 9.9–13.4)
PROTHROM AB SERPL-ACNC: 12.3 SEC — SIGNIFICANT CHANGE UP (ref 9.9–13.4)
PROTHROM AB SERPL-ACNC: 12.5 SEC — SIGNIFICANT CHANGE UP (ref 9.9–13.4)
PROTHROM AB SERPL-ACNC: 12.6 SEC — SIGNIFICANT CHANGE UP (ref 9.9–13.4)
PROTHROM AB SERPL-ACNC: 12.6 SEC — SIGNIFICANT CHANGE UP (ref 9.9–13.4)
PROTHROM AB SERPL-ACNC: 12.7 SEC — SIGNIFICANT CHANGE UP (ref 9.9–13.4)
PROTHROM AB SERPL-ACNC: 12.8 SEC — SIGNIFICANT CHANGE UP (ref 9.9–13.4)
PROTHROM AB SERPL-ACNC: 12.9 SEC — SIGNIFICANT CHANGE UP (ref 9.9–13.4)
PROTHROM AB SERPL-ACNC: 12.9 SEC — SIGNIFICANT CHANGE UP (ref 9.9–13.4)
PROTHROM AB SERPL-ACNC: 13.2 SEC — SIGNIFICANT CHANGE UP (ref 9.9–13.4)
PROTHROM AB SERPL-ACNC: 13.3 SEC — SIGNIFICANT CHANGE UP (ref 9.9–13.4)
PROTHROM AB SERPL-ACNC: 13.6 SEC — HIGH (ref 9.9–13.4)
PROTHROM AB SERPL-ACNC: 14.1 SEC — HIGH (ref 9.9–13.4)
PYRIDOXAL PHOS SERPL-MCNC: 18.9 UG/L — SIGNIFICANT CHANGE UP (ref 3.4–65.2)
RBC # BLD: 2.52 M/UL — LOW (ref 3.8–5.2)
RBC # BLD: 2.67 M/UL — LOW (ref 3.8–5.2)
RBC # BLD: 2.7 M/UL — LOW (ref 3.8–5.2)
RBC # BLD: 2.73 M/UL — LOW (ref 3.8–5.2)
RBC # BLD: 2.74 M/UL — LOW (ref 3.8–5.2)
RBC # BLD: 2.75 M/UL — LOW (ref 3.8–5.2)
RBC # BLD: 2.76 M/UL — LOW (ref 3.8–5.2)
RBC # BLD: 2.76 M/UL — LOW (ref 3.8–5.2)
RBC # BLD: 2.77 M/UL — LOW (ref 3.8–5.2)
RBC # BLD: 2.8 M/UL — LOW (ref 3.8–5.2)
RBC # BLD: 2.8 M/UL — LOW (ref 3.8–5.2)
RBC # BLD: 2.81 M/UL — LOW (ref 3.8–5.2)
RBC # BLD: 2.82 M/UL — LOW (ref 3.8–5.2)
RBC # BLD: 3.02 M/UL — LOW (ref 3.8–5.2)
RBC # BLD: 3.03 M/UL — LOW (ref 3.8–5.2)
RBC # BLD: 3.11 M/UL — LOW (ref 3.8–5.2)
RBC # BLD: 3.14 M/UL — LOW (ref 3.8–5.2)
RBC # BLD: 3.21 M/UL — LOW (ref 3.8–5.2)
RBC # BLD: 3.23 M/UL — LOW (ref 3.8–5.2)
RBC # BLD: 3.37 M/UL — LOW (ref 3.8–5.2)
RBC # BLD: 3.39 M/UL — LOW (ref 3.8–5.2)
RBC # BLD: 3.49 M/UL — LOW (ref 3.8–5.2)
RBC # BLD: 3.67 M/UL — LOW (ref 3.8–5.2)
RBC # BLD: 3.98 M/UL — SIGNIFICANT CHANGE UP (ref 3.8–5.2)
RBC # BLD: 4.19 M/UL — SIGNIFICANT CHANGE UP (ref 3.8–5.2)
RBC # FLD: 13.5 % — SIGNIFICANT CHANGE UP (ref 10.3–14.5)
RBC # FLD: 13.8 % — SIGNIFICANT CHANGE UP (ref 10.3–14.5)
RBC # FLD: 13.8 % — SIGNIFICANT CHANGE UP (ref 10.3–14.5)
RBC # FLD: 14 % — SIGNIFICANT CHANGE UP (ref 10.3–14.5)
RBC # FLD: 14 % — SIGNIFICANT CHANGE UP (ref 10.3–14.5)
RBC # FLD: 14.1 % — SIGNIFICANT CHANGE UP (ref 10.3–14.5)
RBC # FLD: 14.2 % — SIGNIFICANT CHANGE UP (ref 10.3–14.5)
RBC # FLD: 14.3 % — SIGNIFICANT CHANGE UP (ref 10.3–14.5)
RBC # FLD: 14.3 % — SIGNIFICANT CHANGE UP (ref 10.3–14.5)
RBC # FLD: 14.4 % — SIGNIFICANT CHANGE UP (ref 10.3–14.5)
RBC # FLD: 14.5 % — SIGNIFICANT CHANGE UP (ref 10.3–14.5)
RBC # FLD: 14.6 % — HIGH (ref 10.3–14.5)
RBC # FLD: 14.6 % — HIGH (ref 10.3–14.5)
RBC BLD AUTO: ABNORMAL
RBC BLD AUTO: ABNORMAL
RBC BLD AUTO: NORMAL — SIGNIFICANT CHANGE UP
RBC CASTS # UR COMP ASSIST: 10 /HPF — HIGH (ref 0–4)
RBC CASTS # UR COMP ASSIST: 2 /HPF — SIGNIFICANT CHANGE UP (ref 0–4)
RBC CASTS # UR COMP ASSIST: 4 /HPF — SIGNIFICANT CHANGE UP (ref 0–4)
RBC CASTS # UR COMP ASSIST: 5 /HPF — HIGH (ref 0–4)
RBC CASTS # UR COMP ASSIST: 9 /HPF — HIGH (ref 0–4)
REVIEW: SIGNIFICANT CHANGE UP
REVIEW: SIGNIFICANT CHANGE UP
RH IG SCN BLD-IMP: POSITIVE — SIGNIFICANT CHANGE UP
S AUREUS DNA NOSE QL NAA+PROBE: SIGNIFICANT CHANGE UP
SAO2 % BLDA: 99.3 % — HIGH (ref 94–98)
SAO2 % BLDV: 64 % — LOW (ref 67–88)
SAO2 % BLDV: 80.1 % — SIGNIFICANT CHANGE UP (ref 67–88)
SMUDGE CELLS # BLD: PRESENT — SIGNIFICANT CHANGE UP
SMUDGE CELLS # BLD: PRESENT — SIGNIFICANT CHANGE UP
SODIUM BLDA-SCNC: 132 MMOL/L — LOW (ref 136–145)
SODIUM SERPL-SCNC: 133 MMOL/L — LOW (ref 135–145)
SODIUM SERPL-SCNC: 134 MMOL/L — LOW (ref 135–145)
SODIUM SERPL-SCNC: 134 MMOL/L — LOW (ref 135–145)
SODIUM SERPL-SCNC: 136 MMOL/L — SIGNIFICANT CHANGE UP (ref 135–145)
SODIUM SERPL-SCNC: 137 MMOL/L — SIGNIFICANT CHANGE UP (ref 135–145)
SODIUM SERPL-SCNC: 138 MMOL/L — SIGNIFICANT CHANGE UP (ref 135–145)
SODIUM SERPL-SCNC: 139 MMOL/L — SIGNIFICANT CHANGE UP (ref 135–145)
SODIUM SERPL-SCNC: 140 MMOL/L — SIGNIFICANT CHANGE UP (ref 135–145)
SODIUM SERPL-SCNC: 141 MMOL/L — SIGNIFICANT CHANGE UP (ref 135–145)
SODIUM UR-SCNC: 32 MMOL/L — SIGNIFICANT CHANGE UP
SP GR SPEC: 1.01 — SIGNIFICANT CHANGE UP (ref 1–1.03)
SP GR SPEC: 1.02 — SIGNIFICANT CHANGE UP (ref 1–1.03)
SP GR SPEC: 1.03 — HIGH (ref 1–1.03)
SP GR UR STRIP: 1.02 — SIGNIFICANT CHANGE UP (ref 1.01–1.02)
SPECIMEN SOURCE: SIGNIFICANT CHANGE UP
SQUAMOUS # UR AUTO: 0 /HPF — SIGNIFICANT CHANGE UP (ref 0–5)
SQUAMOUS # UR AUTO: 1 /HPF — SIGNIFICANT CHANGE UP (ref 0–5)
SQUAMOUS # UR AUTO: 1 /HPF — SIGNIFICANT CHANGE UP (ref 0–5)
SQUAMOUS # UR AUTO: 4 /HPF — SIGNIFICANT CHANGE UP (ref 0–5)
SQUAMOUS # UR AUTO: 5 /HPF — SIGNIFICANT CHANGE UP (ref 0–5)
TRIGL SERPL-MCNC: 59 MG/DL — SIGNIFICANT CHANGE UP
TROPONIN T, HIGH SENSITIVITY RESULT: 2574 NG/L — CRITICAL HIGH
TROPONIN T, HIGH SENSITIVITY RESULT: 2580 NG/L — CRITICAL HIGH
TROPONIN T, HIGH SENSITIVITY RESULT: 2616 NG/L — CRITICAL HIGH
TROPONIN T, HIGH SENSITIVITY RESULT: 2734 NG/L — CRITICAL HIGH
TROPONIN T, HIGH SENSITIVITY RESULT: 405 NG/L — CRITICAL HIGH
TROPONIN T, HIGH SENSITIVITY RESULT: 410 NG/L — CRITICAL HIGH
TROPONIN T, HIGH SENSITIVITY RESULT: 584 NG/L — CRITICAL HIGH
TSH SERPL-MCNC: 0.73 UIU/ML — SIGNIFICANT CHANGE UP (ref 0.27–4.2)
UROBILINOGEN FLD QL: 0.2 MG/DL — SIGNIFICANT CHANGE UP (ref 0.2–1)
UROBILINOGEN FLD QL: 1 MG/DL — SIGNIFICANT CHANGE UP (ref 0.2–1)
UUN UR-MCNC: 238 MG/DL — SIGNIFICANT CHANGE UP
VARIANT LYMPHS # BLD: 1.8 % — SIGNIFICANT CHANGE UP (ref 0–6)
VARIANT LYMPHS NFR BLD MANUAL: 1.8 % — SIGNIFICANT CHANGE UP (ref 0–6)
VIT B1 SERPL-MCNC: 153.2 NMOL/L — SIGNIFICANT CHANGE UP (ref 66.5–200)
VIT B12 SERPL-MCNC: 1067 PG/ML — HIGH (ref 200–900)
WBC # BLD: 11.63 K/UL — HIGH (ref 3.8–10.5)
WBC # BLD: 11.66 K/UL — HIGH (ref 3.8–10.5)
WBC # BLD: 11.88 K/UL — HIGH (ref 3.8–10.5)
WBC # BLD: 11.9 K/UL — HIGH (ref 3.8–10.5)
WBC # BLD: 11.94 K/UL — HIGH (ref 3.8–10.5)
WBC # BLD: 12.54 K/UL — HIGH (ref 3.8–10.5)
WBC # BLD: 12.64 K/UL — HIGH (ref 3.8–10.5)
WBC # BLD: 12.79 K/UL — HIGH (ref 3.8–10.5)
WBC # BLD: 13.07 K/UL — HIGH (ref 3.8–10.5)
WBC # BLD: 13.24 K/UL — HIGH (ref 3.8–10.5)
WBC # BLD: 13.53 K/UL — HIGH (ref 3.8–10.5)
WBC # BLD: 13.69 K/UL — HIGH (ref 3.8–10.5)
WBC # BLD: 13.76 K/UL — HIGH (ref 3.8–10.5)
WBC # BLD: 13.82 K/UL — HIGH (ref 3.8–10.5)
WBC # BLD: 14.01 K/UL — HIGH (ref 3.8–10.5)
WBC # BLD: 14.51 K/UL — HIGH (ref 3.8–10.5)
WBC # BLD: 14.54 K/UL — HIGH (ref 3.8–10.5)
WBC # BLD: 15.5 K/UL — HIGH (ref 3.8–10.5)
WBC # BLD: 16.9 K/UL — HIGH (ref 3.8–10.5)
WBC # BLD: 17.62 K/UL — HIGH (ref 3.8–10.5)
WBC # BLD: 18.82 K/UL — HIGH (ref 3.8–10.5)
WBC # BLD: 19.6 K/UL — HIGH (ref 3.8–10.5)
WBC # BLD: 20.18 K/UL — HIGH (ref 3.8–10.5)
WBC # BLD: 21.13 K/UL — HIGH (ref 3.8–10.5)
WBC # BLD: 23.03 K/UL — HIGH (ref 3.8–10.5)
WBC # FLD AUTO: 11.63 K/UL — HIGH (ref 3.8–10.5)
WBC # FLD AUTO: 11.66 K/UL — HIGH (ref 3.8–10.5)
WBC # FLD AUTO: 11.88 K/UL — HIGH (ref 3.8–10.5)
WBC # FLD AUTO: 11.9 K/UL — HIGH (ref 3.8–10.5)
WBC # FLD AUTO: 11.94 K/UL — HIGH (ref 3.8–10.5)
WBC # FLD AUTO: 12.54 K/UL — HIGH (ref 3.8–10.5)
WBC # FLD AUTO: 12.64 K/UL — HIGH (ref 3.8–10.5)
WBC # FLD AUTO: 12.79 K/UL — HIGH (ref 3.8–10.5)
WBC # FLD AUTO: 13.07 K/UL — HIGH (ref 3.8–10.5)
WBC # FLD AUTO: 13.24 K/UL — HIGH (ref 3.8–10.5)
WBC # FLD AUTO: 13.53 K/UL — HIGH (ref 3.8–10.5)
WBC # FLD AUTO: 13.69 K/UL — HIGH (ref 3.8–10.5)
WBC # FLD AUTO: 13.76 K/UL — HIGH (ref 3.8–10.5)
WBC # FLD AUTO: 13.82 K/UL — HIGH (ref 3.8–10.5)
WBC # FLD AUTO: 14.01 K/UL — HIGH (ref 3.8–10.5)
WBC # FLD AUTO: 14.51 K/UL — HIGH (ref 3.8–10.5)
WBC # FLD AUTO: 14.54 K/UL — HIGH (ref 3.8–10.5)
WBC # FLD AUTO: 15.5 K/UL — HIGH (ref 3.8–10.5)
WBC # FLD AUTO: 16.9 K/UL — HIGH (ref 3.8–10.5)
WBC # FLD AUTO: 17.62 K/UL — HIGH (ref 3.8–10.5)
WBC # FLD AUTO: 18.82 K/UL — HIGH (ref 3.8–10.5)
WBC # FLD AUTO: 19.6 K/UL — HIGH (ref 3.8–10.5)
WBC # FLD AUTO: 20.18 K/UL — HIGH (ref 3.8–10.5)
WBC # FLD AUTO: 21.13 K/UL — HIGH (ref 3.8–10.5)
WBC # FLD AUTO: 23.03 K/UL — HIGH (ref 3.8–10.5)
WBC UR QL: 0 /HPF — SIGNIFICANT CHANGE UP (ref 0–5)
WBC UR QL: 1 /HPF — SIGNIFICANT CHANGE UP (ref 0–5)
WBC UR QL: 1 /HPF — SIGNIFICANT CHANGE UP (ref 0–5)
WBC UR QL: 2 /HPF — SIGNIFICANT CHANGE UP (ref 0–5)
WBC UR QL: 42 /HPF — HIGH (ref 0–5)

## 2025-01-01 PROCEDURE — 99223 1ST HOSP IP/OBS HIGH 75: CPT

## 2025-01-01 PROCEDURE — 71045 X-RAY EXAM CHEST 1 VIEW: CPT | Mod: 26

## 2025-01-01 PROCEDURE — 93460 R&L HRT ART/VENTRICLE ANGIO: CPT | Mod: 26

## 2025-01-01 PROCEDURE — 71045 X-RAY EXAM CHEST 1 VIEW: CPT | Mod: 26,77

## 2025-01-01 PROCEDURE — 76376 3D RENDER W/INTRP POSTPROCES: CPT | Mod: 26

## 2025-01-01 PROCEDURE — 99233 SBSQ HOSP IP/OBS HIGH 50: CPT

## 2025-01-01 PROCEDURE — 70450 CT HEAD/BRAIN W/O DYE: CPT | Mod: 26

## 2025-01-01 PROCEDURE — 99291 CRITICAL CARE FIRST HOUR: CPT

## 2025-01-01 PROCEDURE — 93306 TTE W/DOPPLER COMPLETE: CPT | Mod: 26

## 2025-01-01 PROCEDURE — 71250 CT THORAX DX C-: CPT | Mod: 26

## 2025-01-01 PROCEDURE — 95718 EEG PHYS/QHP 2-12 HR W/VEEG: CPT

## 2025-01-01 PROCEDURE — 99223 1ST HOSP IP/OBS HIGH 75: CPT | Mod: 25

## 2025-01-01 PROCEDURE — 99232 SBSQ HOSP IP/OBS MODERATE 35: CPT

## 2025-01-01 PROCEDURE — 31624 DX BRONCHOSCOPE/LAVAGE: CPT | Mod: GC

## 2025-01-01 PROCEDURE — 95720 EEG PHY/QHP EA INCR W/VEEG: CPT

## 2025-01-01 PROCEDURE — 99291 CRITICAL CARE FIRST HOUR: CPT | Mod: GC

## 2025-01-01 PROCEDURE — 86079 PHYS BLOOD BANK SERV AUTHRJ: CPT

## 2025-01-01 PROCEDURE — 93356 MYOCRD STRAIN IMG SPCKL TRCK: CPT

## 2025-01-01 PROCEDURE — 74176 CT ABD & PELVIS W/O CONTRAST: CPT | Mod: 26

## 2025-01-01 DEVICE — STAPLER COVIDIEN TRI-STAPLE CURVED 45MM TAN RELOAD: Type: IMPLANTABLE DEVICE | Status: FUNCTIONAL

## 2025-01-01 DEVICE — BONE WAX 2.5GM: Type: IMPLANTABLE DEVICE | Status: FUNCTIONAL

## 2025-01-01 DEVICE — CATH FOGARTY 3FR X 80CM: Type: IMPLANTABLE DEVICE | Status: FUNCTIONAL

## 2025-01-01 RX ORDER — MIDAZOLAM IN 0.9 % SOD.CHLORID 1 MG/ML
4 PLASTIC BAG, INJECTION (ML) INTRAVENOUS ONCE
Refills: 0 | Status: DISCONTINUED | OUTPATIENT
Start: 2025-01-01 | End: 2025-01-01

## 2025-01-01 RX ORDER — AMIODARONE HYDROCHLORIDE 50 MG/ML
1 INJECTION, SOLUTION INTRAVENOUS
Qty: 450 | Refills: 0 | Status: DISCONTINUED | OUTPATIENT
Start: 2025-01-01 | End: 2025-01-01

## 2025-01-01 RX ORDER — LACOSAMIDE 150 MG/1
300 TABLET, FILM COATED ORAL ONCE
Refills: 0 | Status: DISCONTINUED | OUTPATIENT
Start: 2025-01-01 | End: 2025-01-01

## 2025-01-01 RX ORDER — INSULIN LISPRO 100 U/ML
INJECTION, SOLUTION INTRAVENOUS; SUBCUTANEOUS AT BEDTIME
Refills: 0 | Status: DISCONTINUED | OUTPATIENT
Start: 2025-01-01 | End: 2025-01-01

## 2025-01-01 RX ORDER — FENTANYL CITRATE-0.9 % NACL/PF 100MCG/2ML
0.5 SYRINGE (ML) INTRAVENOUS
Qty: 2500 | Refills: 0 | Status: DISCONTINUED | OUTPATIENT
Start: 2025-01-01 | End: 2025-01-01

## 2025-01-01 RX ORDER — FENTANYL CITRATE-0.9 % NACL/PF 100MCG/2ML
0.5 SYRINGE (ML) INTRAVENOUS
Qty: 5000 | Refills: 0 | Status: DISCONTINUED | OUTPATIENT
Start: 2025-01-01 | End: 2025-01-01

## 2025-01-01 RX ORDER — MIDAZOLAM IN 0.9 % SOD.CHLORID 1 MG/ML
2 PLASTIC BAG, INJECTION (ML) INTRAVENOUS ONCE
Refills: 0 | Status: DISCONTINUED | OUTPATIENT
Start: 2025-01-01 | End: 2025-01-01

## 2025-01-01 RX ORDER — FLUCONAZOLE 150 MG
400 TABLET ORAL DAILY
Refills: 0 | Status: DISCONTINUED | OUTPATIENT
Start: 2025-01-01 | End: 2025-01-01

## 2025-01-01 RX ORDER — ACETAMINOPHEN 500 MG/5ML
650 LIQUID (ML) ORAL ONCE
Refills: 0 | Status: COMPLETED | OUTPATIENT
Start: 2025-01-01 | End: 2025-01-01

## 2025-01-01 RX ORDER — ACETAMINOPHEN 500 MG/5ML
1000 LIQUID (ML) ORAL ONCE
Refills: 0 | Status: COMPLETED | OUTPATIENT
Start: 2025-01-01 | End: 2025-01-01

## 2025-01-01 RX ORDER — ALBUMIN (HUMAN) 12.5 G/50ML
50 INJECTION, SOLUTION INTRAVENOUS ONCE
Refills: 0 | Status: COMPLETED | OUTPATIENT
Start: 2025-01-01 | End: 2025-01-01

## 2025-01-01 RX ORDER — LORAZEPAM 4 MG/ML
2 VIAL (ML) INJECTION ONCE
Refills: 0 | Status: DISCONTINUED | OUTPATIENT
Start: 2025-01-01 | End: 2025-01-01

## 2025-01-01 RX ORDER — METOPROLOL SUCCINATE 50 MG/1
25 TABLET, EXTENDED RELEASE ORAL
Refills: 0 | Status: DISCONTINUED | OUTPATIENT
Start: 2025-01-01 | End: 2025-01-01

## 2025-01-01 RX ORDER — LACOSAMIDE 150 MG/1
200 TABLET, FILM COATED ORAL ONCE
Refills: 0 | Status: DISCONTINUED | OUTPATIENT
Start: 2025-01-01 | End: 2025-01-01

## 2025-01-01 RX ORDER — DEXTROSE 50 % IN WATER 50 %
15 SYRINGE (ML) INTRAVENOUS ONCE
Refills: 0 | Status: DISCONTINUED | OUTPATIENT
Start: 2025-01-01 | End: 2025-01-01

## 2025-01-01 RX ORDER — PROPOFOL 10 MG/ML
10 INJECTION, EMULSION INTRAVENOUS
Qty: 1000 | Refills: 0 | Status: DISCONTINUED | OUTPATIENT
Start: 2025-01-01 | End: 2025-01-01

## 2025-01-01 RX ORDER — GLYCOPYRROLATE 0.2 MG/ML
0.2 INJECTION INTRAMUSCULAR; INTRAVENOUS EVERY 6 HOURS
Refills: 0 | Status: DISCONTINUED | OUTPATIENT
Start: 2025-01-01 | End: 2025-01-01

## 2025-01-01 RX ORDER — SENNA 187 MG
2 TABLET ORAL AT BEDTIME
Refills: 0 | Status: DISCONTINUED | OUTPATIENT
Start: 2025-01-01 | End: 2025-01-01

## 2025-01-01 RX ORDER — LOSARTAN POTASSIUM 100 MG/1
1 TABLET, FILM COATED ORAL
Refills: 0 | DISCHARGE

## 2025-01-01 RX ORDER — VANCOMYCIN HCL IN 5 % DEXTROSE 1.5G/250ML
1000 PLASTIC BAG, INJECTION (ML) INTRAVENOUS ONCE
Refills: 0 | Status: COMPLETED | OUTPATIENT
Start: 2025-01-01 | End: 2025-01-01

## 2025-01-01 RX ORDER — APIXABAN 2.5 MG/1
1 TABLET, FILM COATED ORAL
Refills: 0 | DISCHARGE

## 2025-01-01 RX ORDER — PIPERACILLIN-TAZO-DEXTROSE,ISO 3.375G/5
3.38 IV SOLUTION, PIGGYBACK PREMIX FROZEN(ML) INTRAVENOUS ONCE
Refills: 0 | Status: COMPLETED | OUTPATIENT
Start: 2025-01-01 | End: 2025-01-01

## 2025-01-01 RX ORDER — FENTANYL CITRATE-0.9 % NACL/PF 100MCG/2ML
100 SYRINGE (ML) INTRAVENOUS ONCE
Refills: 0 | Status: DISCONTINUED | OUTPATIENT
Start: 2025-01-01 | End: 2025-01-01

## 2025-01-01 RX ORDER — PIPERACILLIN-TAZO-DEXTROSE,ISO 3.375G/5
3.38 IV SOLUTION, PIGGYBACK PREMIX FROZEN(ML) INTRAVENOUS EVERY 8 HOURS
Refills: 0 | Status: DISCONTINUED | OUTPATIENT
Start: 2025-01-01 | End: 2025-01-01

## 2025-01-01 RX ORDER — GLYCOPYRROLATE 0.2 MG/ML
0.2 INJECTION INTRAMUSCULAR; INTRAVENOUS ONCE
Refills: 0 | Status: COMPLETED | OUTPATIENT
Start: 2025-01-01 | End: 2025-01-01

## 2025-01-01 RX ORDER — GLUCAGON 3 MG/1
1 POWDER NASAL ONCE
Refills: 0 | Status: DISCONTINUED | OUTPATIENT
Start: 2025-01-01 | End: 2025-01-01

## 2025-01-01 RX ORDER — NOREPINEPHRINE BITARTRATE 8 MG
0.05 SOLUTION INTRAVENOUS
Qty: 16 | Refills: 0 | Status: DISCONTINUED | OUTPATIENT
Start: 2025-01-01 | End: 2025-01-01

## 2025-01-01 RX ORDER — LORAZEPAM 4 MG/ML
4 VIAL (ML) INJECTION ONCE
Refills: 0 | Status: DISCONTINUED | OUTPATIENT
Start: 2025-01-01 | End: 2025-01-01

## 2025-01-01 RX ORDER — FUROSEMIDE 10 MG/ML
80 INJECTION INTRAMUSCULAR; INTRAVENOUS ONCE
Refills: 0 | Status: COMPLETED | OUTPATIENT
Start: 2025-01-01 | End: 2025-01-01

## 2025-01-01 RX ORDER — ISOSORBDIE DINITRATE 30 MG/1
10 TABLET ORAL THREE TIMES A DAY
Refills: 0 | Status: DISCONTINUED | OUTPATIENT
Start: 2025-01-01 | End: 2025-01-01

## 2025-01-01 RX ORDER — HEPARIN SODIUM 1000 [USP'U]/ML
2500 INJECTION INTRAVENOUS; SUBCUTANEOUS EVERY 6 HOURS
Refills: 0 | Status: DISCONTINUED | OUTPATIENT
Start: 2025-01-01 | End: 2025-01-01

## 2025-01-01 RX ORDER — ALBUMIN (HUMAN) 12.5 G/50ML
100 INJECTION, SOLUTION INTRAVENOUS ONCE
Refills: 0 | Status: COMPLETED | OUTPATIENT
Start: 2025-01-01 | End: 2025-01-01

## 2025-01-01 RX ORDER — DEXTROSE 50 % IN WATER 50 %
12.5 SYRINGE (ML) INTRAVENOUS ONCE
Refills: 0 | Status: DISCONTINUED | OUTPATIENT
Start: 2025-01-01 | End: 2025-01-01

## 2025-01-01 RX ORDER — HEPARIN SODIUM 1000 [USP'U]/ML
INJECTION INTRAVENOUS; SUBCUTANEOUS
Qty: 25000 | Refills: 0 | Status: DISCONTINUED | OUTPATIENT
Start: 2025-01-01 | End: 2025-01-01

## 2025-01-01 RX ORDER — IPRATROPIUM BROMIDE AND ALBUTEROL SULFATE .5; 2.5 MG/3ML; MG/3ML
3 SOLUTION RESPIRATORY (INHALATION) EVERY 6 HOURS
Refills: 0 | Status: DISCONTINUED | OUTPATIENT
Start: 2025-01-01 | End: 2025-01-01

## 2025-01-01 RX ORDER — MIDAZOLAM IN 0.9 % SOD.CHLORID 1 MG/ML
0.02 PLASTIC BAG, INJECTION (ML) INTRAVENOUS
Qty: 100 | Refills: 0 | Status: DISCONTINUED | OUTPATIENT
Start: 2025-01-01 | End: 2025-01-01

## 2025-01-01 RX ORDER — PSYLLIUM SEED (WITH DEXTROSE)
1 POWDER (GRAM) ORAL THREE TIMES A DAY
Refills: 0 | Status: DISCONTINUED | OUTPATIENT
Start: 2025-01-01 | End: 2025-01-01

## 2025-01-01 RX ORDER — INSULIN LISPRO 100 U/ML
INJECTION, SOLUTION INTRAVENOUS; SUBCUTANEOUS
Refills: 0 | Status: DISCONTINUED | OUTPATIENT
Start: 2025-01-01 | End: 2025-01-01

## 2025-01-01 RX ORDER — LACOSAMIDE 150 MG/1
50 TABLET, FILM COATED ORAL ONCE
Refills: 0 | Status: DISCONTINUED | OUTPATIENT
Start: 2025-01-01 | End: 2025-01-01

## 2025-01-01 RX ORDER — LACOSAMIDE 150 MG/1
300 TABLET, FILM COATED ORAL
Refills: 0 | Status: DISCONTINUED | OUTPATIENT
Start: 2025-01-01 | End: 2025-01-01

## 2025-01-01 RX ORDER — POTASSIUM PHOSPHATE, MONOBASIC POTASSIUM PHOSPHATE, DIBASIC INJECTION, 236; 224 MG/ML; MG/ML
15 SOLUTION, CONCENTRATE INTRAVENOUS ONCE
Refills: 0 | Status: COMPLETED | OUTPATIENT
Start: 2025-01-01 | End: 2025-01-01

## 2025-01-01 RX ORDER — MAGNESIUM SULFATE 500 MG/ML
1 SYRINGE (ML) INJECTION ONCE
Refills: 0 | Status: COMPLETED | OUTPATIENT
Start: 2025-01-01 | End: 2025-01-01

## 2025-01-01 RX ORDER — HEPARIN SODIUM 1000 [USP'U]/ML
800 INJECTION INTRAVENOUS; SUBCUTANEOUS
Qty: 25000 | Refills: 0 | Status: DISCONTINUED | OUTPATIENT
Start: 2025-01-01 | End: 2025-01-01

## 2025-01-01 RX ORDER — HEPARIN SODIUM 1000 [USP'U]/ML
30000 INJECTION INTRAVENOUS; SUBCUTANEOUS ONCE
Refills: 0 | Status: COMPLETED | OUTPATIENT
Start: 2025-01-01 | End: 2025-01-01

## 2025-01-01 RX ORDER — ACETAMINOPHEN 500 MG/5ML
650 LIQUID (ML) ORAL EVERY 6 HOURS
Refills: 0 | Status: DISCONTINUED | OUTPATIENT
Start: 2025-01-01 | End: 2025-01-01

## 2025-01-01 RX ORDER — POLYETHYLENE GLYCOL 3350 17 G/17G
17 POWDER, FOR SOLUTION ORAL EVERY 24 HOURS
Refills: 0 | Status: DISCONTINUED | OUTPATIENT
Start: 2025-01-01 | End: 2025-01-01

## 2025-01-01 RX ORDER — AMIODARONE HYDROCHLORIDE 50 MG/ML
0.5 INJECTION, SOLUTION INTRAVENOUS
Qty: 450 | Refills: 0 | Status: DISCONTINUED | OUTPATIENT
Start: 2025-01-01 | End: 2025-01-01

## 2025-01-01 RX ORDER — VANCOMYCIN HCL IN 5 % DEXTROSE 1.5G/250ML
1 PLASTIC BAG, INJECTION (ML) INTRAVENOUS ONCE
Refills: 0 | Status: DISCONTINUED | OUTPATIENT
Start: 2025-01-01 | End: 2025-01-01

## 2025-01-01 RX ORDER — SCOPOLAMINE 1 MG/3D
1 PATCH, EXTENDED RELEASE TRANSDERMAL
Refills: 0 | Status: DISCONTINUED | OUTPATIENT
Start: 2025-01-01 | End: 2025-01-01

## 2025-01-01 RX ORDER — FUROSEMIDE 10 MG/ML
40 INJECTION INTRAMUSCULAR; INTRAVENOUS ONCE
Refills: 0 | Status: COMPLETED | OUTPATIENT
Start: 2025-01-01 | End: 2025-01-01

## 2025-01-01 RX ORDER — ACETAZOLAMIDE 250 MG/1
500 TABLET ORAL ONCE
Refills: 0 | Status: COMPLETED | OUTPATIENT
Start: 2025-01-01 | End: 2025-01-01

## 2025-01-01 RX ORDER — SODIUM CHLORIDE 9 G/1000ML
1000 INJECTION, SOLUTION INTRAVENOUS
Refills: 0 | Status: DISCONTINUED | OUTPATIENT
Start: 2025-01-01 | End: 2025-01-01

## 2025-01-01 RX ORDER — LISINOPRIL 30 MG/1
1 TABLET ORAL
Refills: 0 | DISCHARGE

## 2025-01-01 RX ORDER — PIPERACILLIN-TAZO-DEXTROSE,ISO 3.375G/5
3.38 IV SOLUTION, PIGGYBACK PREMIX FROZEN(ML) INTRAVENOUS ONCE
Refills: 0 | Status: DISCONTINUED | OUTPATIENT
Start: 2025-01-01 | End: 2025-01-01

## 2025-01-01 RX ORDER — HEPARIN SODIUM 1000 [USP'U]/ML
5000 INJECTION INTRAVENOUS; SUBCUTANEOUS ONCE
Refills: 0 | Status: COMPLETED | OUTPATIENT
Start: 2025-01-01 | End: 2025-01-01

## 2025-01-01 RX ORDER — DEXTROSE 50 % IN WATER 50 %
25 SYRINGE (ML) INTRAVENOUS ONCE
Refills: 0 | Status: DISCONTINUED | OUTPATIENT
Start: 2025-01-01 | End: 2025-01-01

## 2025-01-01 RX ORDER — PROPOFOL 10 MG/ML
20 INJECTION, EMULSION INTRAVENOUS
Qty: 1000 | Refills: 0 | Status: DISCONTINUED | OUTPATIENT
Start: 2025-01-01 | End: 2025-01-01

## 2025-01-01 RX ORDER — PIPERACILLIN-TAZO-DEXTROSE,ISO 3.375G/5
3.38 IV SOLUTION, PIGGYBACK PREMIX FROZEN(ML) INTRAVENOUS EVERY 12 HOURS
Refills: 0 | Status: COMPLETED | OUTPATIENT
Start: 2025-01-01 | End: 2025-01-01

## 2025-01-01 RX ORDER — HEPARIN SODIUM 1000 [USP'U]/ML
5000 INJECTION INTRAVENOUS; SUBCUTANEOUS EVERY 6 HOURS
Refills: 0 | Status: DISCONTINUED | OUTPATIENT
Start: 2025-01-01 | End: 2025-01-01

## 2025-01-01 RX ORDER — HYPROMELLOSE 0.4 %
1 DROPS OPHTHALMIC (EYE)
Refills: 0 | Status: DISCONTINUED | OUTPATIENT
Start: 2025-01-01 | End: 2025-01-01

## 2025-01-01 RX ORDER — FUROSEMIDE 10 MG/ML
20 INJECTION INTRAMUSCULAR; INTRAVENOUS ONCE
Refills: 0 | Status: COMPLETED | OUTPATIENT
Start: 2025-01-01 | End: 2025-01-01

## 2025-01-01 RX ORDER — APIXABAN 2.5 MG/1
5 TABLET, FILM COATED ORAL
Refills: 0 | Status: DISCONTINUED | OUTPATIENT
Start: 2025-01-01 | End: 2025-01-01

## 2025-01-01 RX ADMIN — Medication 25 MILLIGRAM(S): at 13:30

## 2025-01-01 RX ADMIN — Medication 1 DROP(S): at 15:57

## 2025-01-01 RX ADMIN — Medication 15 MILLILITER(S): at 05:24

## 2025-01-01 RX ADMIN — Medication 2 MILLIGRAM(S): at 19:29

## 2025-01-01 RX ADMIN — LACOSAMIDE 300 MILLIGRAM(S): 150 TABLET, FILM COATED ORAL at 17:44

## 2025-01-01 RX ADMIN — Medication 25 GRAM(S): at 03:18

## 2025-01-01 RX ADMIN — Medication 15 MILLILITER(S): at 17:31

## 2025-01-01 RX ADMIN — Medication 20 MILLIEQUIVALENT(S): at 06:07

## 2025-01-01 RX ADMIN — Medication 1 DROP(S): at 04:41

## 2025-01-01 RX ADMIN — Medication 1 APPLICATION(S): at 05:20

## 2025-01-01 RX ADMIN — Medication 2 TABLET(S): at 23:08

## 2025-01-01 RX ADMIN — POLYETHYLENE GLYCOL 3350 17 GRAM(S): 17 POWDER, FOR SOLUTION ORAL at 21:24

## 2025-01-01 RX ADMIN — ISOSORBDIE DINITRATE 10 MILLIGRAM(S): 30 TABLET ORAL at 17:49

## 2025-01-01 RX ADMIN — Medication 25 MILLIGRAM(S): at 21:25

## 2025-01-01 RX ADMIN — Medication 25 GRAM(S): at 19:55

## 2025-01-01 RX ADMIN — Medication 2 MILLIGRAM(S): at 19:43

## 2025-01-01 RX ADMIN — Medication 1 DROP(S): at 23:35

## 2025-01-01 RX ADMIN — ISOSORBDIE DINITRATE 10 MILLIGRAM(S): 30 TABLET ORAL at 17:08

## 2025-01-01 RX ADMIN — Medication 25 MILLIGRAM(S): at 15:33

## 2025-01-01 RX ADMIN — ISOSORBDIE DINITRATE 10 MILLIGRAM(S): 30 TABLET ORAL at 05:24

## 2025-01-01 RX ADMIN — SCOPOLAMINE 1 PATCH: 1 PATCH, EXTENDED RELEASE TRANSDERMAL at 19:12

## 2025-01-01 RX ADMIN — Medication 4 MILLIGRAM(S): at 20:18

## 2025-01-01 RX ADMIN — Medication 1 PACKET(S): at 21:23

## 2025-01-01 RX ADMIN — FUROSEMIDE 20 MILLIGRAM(S): 10 INJECTION INTRAMUSCULAR; INTRAVENOUS at 15:44

## 2025-01-01 RX ADMIN — Medication 25 GRAM(S): at 11:20

## 2025-01-01 RX ADMIN — IPRATROPIUM BROMIDE AND ALBUTEROL SULFATE 3 MILLILITER(S): .5; 2.5 SOLUTION RESPIRATORY (INHALATION) at 22:56

## 2025-01-01 RX ADMIN — IPRATROPIUM BROMIDE AND ALBUTEROL SULFATE 3 MILLILITER(S): .5; 2.5 SOLUTION RESPIRATORY (INHALATION) at 14:32

## 2025-01-01 RX ADMIN — Medication 4 MILLIGRAM(S): at 19:24

## 2025-01-01 RX ADMIN — GLYCOPYRROLATE 0.2 MILLIGRAM(S): 0.2 INJECTION INTRAMUSCULAR; INTRAVENOUS at 05:42

## 2025-01-01 RX ADMIN — Medication 1 DROP(S): at 13:30

## 2025-01-01 RX ADMIN — HEPARIN SODIUM 8 UNIT(S)/HR: 1000 INJECTION INTRAVENOUS; SUBCUTANEOUS at 02:00

## 2025-01-01 RX ADMIN — HEPARIN SODIUM 30000 UNIT(S): 1000 INJECTION INTRAVENOUS; SUBCUTANEOUS at 19:09

## 2025-01-01 RX ADMIN — ISOSORBDIE DINITRATE 10 MILLIGRAM(S): 30 TABLET ORAL at 17:20

## 2025-01-01 RX ADMIN — PROPOFOL 3.9 MICROGRAM(S)/KG/MIN: 10 INJECTION, EMULSION INTRAVENOUS at 00:38

## 2025-01-01 RX ADMIN — Medication 400 MILLIGRAM(S): at 11:08

## 2025-01-01 RX ADMIN — SCOPOLAMINE 1 PATCH: 1 PATCH, EXTENDED RELEASE TRANSDERMAL at 07:16

## 2025-01-01 RX ADMIN — Medication 1 DROP(S): at 21:25

## 2025-01-01 RX ADMIN — Medication 15 MILLILITER(S): at 05:19

## 2025-01-01 RX ADMIN — Medication 1 DROP(S): at 14:18

## 2025-01-01 RX ADMIN — Medication 1 DROP(S): at 22:55

## 2025-01-01 RX ADMIN — Medication 15 MILLILITER(S): at 05:42

## 2025-01-01 RX ADMIN — Medication 1 APPLICATION(S): at 06:14

## 2025-01-01 RX ADMIN — SCOPOLAMINE 1 PATCH: 1 PATCH, EXTENDED RELEASE TRANSDERMAL at 19:38

## 2025-01-01 RX ADMIN — METOPROLOL SUCCINATE 25 MILLIGRAM(S): 50 TABLET, EXTENDED RELEASE ORAL at 05:46

## 2025-01-01 RX ADMIN — LACOSAMIDE 300 MILLIGRAM(S): 150 TABLET, FILM COATED ORAL at 17:08

## 2025-01-01 RX ADMIN — ISOSORBDIE DINITRATE 10 MILLIGRAM(S): 30 TABLET ORAL at 11:04

## 2025-01-01 RX ADMIN — Medication 40 MILLIGRAM(S): at 11:03

## 2025-01-01 RX ADMIN — Medication 1 DROP(S): at 22:12

## 2025-01-01 RX ADMIN — Medication 1 APPLICATION(S): at 01:30

## 2025-01-01 RX ADMIN — Medication 25 MILLIGRAM(S): at 05:10

## 2025-01-01 RX ADMIN — LACOSAMIDE 300 MILLIGRAM(S): 150 TABLET, FILM COATED ORAL at 17:50

## 2025-01-01 RX ADMIN — HEPARIN SODIUM 1100 UNIT(S)/HR: 1000 INJECTION INTRAVENOUS; SUBCUTANEOUS at 07:34

## 2025-01-01 RX ADMIN — Medication 650 MILLIGRAM(S): at 00:21

## 2025-01-01 RX ADMIN — Medication 1 DROP(S): at 06:15

## 2025-01-01 RX ADMIN — POLYETHYLENE GLYCOL 3350 17 GRAM(S): 17 POWDER, FOR SOLUTION ORAL at 17:31

## 2025-01-01 RX ADMIN — GLYCOPYRROLATE 0.2 MILLIGRAM(S): 0.2 INJECTION INTRAMUSCULAR; INTRAVENOUS at 11:49

## 2025-01-01 RX ADMIN — LACOSAMIDE 300 MILLIGRAM(S): 150 TABLET, FILM COATED ORAL at 17:20

## 2025-01-01 RX ADMIN — Medication 1 DROP(S): at 11:49

## 2025-01-01 RX ADMIN — Medication 25 GRAM(S): at 23:10

## 2025-01-01 RX ADMIN — Medication 40 MILLIGRAM(S): at 11:24

## 2025-01-01 RX ADMIN — Medication 1 DROP(S): at 10:05

## 2025-01-01 RX ADMIN — Medication 2 TABLET(S): at 21:42

## 2025-01-01 RX ADMIN — Medication 1 DROP(S): at 13:25

## 2025-01-01 RX ADMIN — Medication 1 DROP(S): at 00:19

## 2025-01-01 RX ADMIN — Medication 100 MICROGRAM(S): at 19:42

## 2025-01-01 RX ADMIN — Medication 25 GRAM(S): at 00:49

## 2025-01-01 RX ADMIN — Medication 25 GRAM(S): at 23:07

## 2025-01-01 RX ADMIN — SCOPOLAMINE 1 PATCH: 1 PATCH, EXTENDED RELEASE TRANSDERMAL at 07:26

## 2025-01-01 RX ADMIN — Medication 400 MILLIGRAM(S): at 18:31

## 2025-01-01 RX ADMIN — FUROSEMIDE 80 MILLIGRAM(S): 10 INJECTION INTRAMUSCULAR; INTRAVENOUS at 21:38

## 2025-01-01 RX ADMIN — Medication 650 MILLIGRAM(S): at 01:56

## 2025-01-01 RX ADMIN — Medication 250 MILLIGRAM(S): at 00:19

## 2025-01-01 RX ADMIN — Medication 1 APPLICATION(S): at 06:05

## 2025-01-01 RX ADMIN — Medication 15 MILLILITER(S): at 06:04

## 2025-01-01 RX ADMIN — PROPOFOL 3.9 MICROGRAM(S)/KG/MIN: 10 INJECTION, EMULSION INTRAVENOUS at 07:34

## 2025-01-01 RX ADMIN — Medication 100 MICROGRAM(S): at 19:58

## 2025-01-01 RX ADMIN — Medication 25 MILLIGRAM(S): at 14:51

## 2025-01-01 RX ADMIN — Medication 100 MICROGRAM(S): at 19:53

## 2025-01-01 RX ADMIN — Medication 10 MILLIGRAM(S): at 22:44

## 2025-01-01 RX ADMIN — Medication 15 MILLILITER(S): at 18:15

## 2025-01-01 RX ADMIN — Medication 25 GRAM(S): at 11:26

## 2025-01-01 RX ADMIN — Medication 2 TABLET(S): at 21:20

## 2025-01-01 RX ADMIN — NOREPINEPHRINE BITARTRATE 3.05 MICROGRAM(S)/KG/MIN: 8 SOLUTION at 22:49

## 2025-01-01 RX ADMIN — IPRATROPIUM BROMIDE AND ALBUTEROL SULFATE 3 MILLILITER(S): .5; 2.5 SOLUTION RESPIRATORY (INHALATION) at 16:18

## 2025-01-01 RX ADMIN — LACOSAMIDE 300 MILLIGRAM(S): 150 TABLET, FILM COATED ORAL at 18:09

## 2025-01-01 RX ADMIN — Medication 2 MILLIGRAM(S): at 05:42

## 2025-01-01 RX ADMIN — PROPOFOL 3.9 MICROGRAM(S)/KG/MIN: 10 INJECTION, EMULSION INTRAVENOUS at 23:09

## 2025-01-01 RX ADMIN — Medication 25 MILLIGRAM(S): at 21:20

## 2025-01-01 RX ADMIN — Medication 25 MILLIGRAM(S): at 14:02

## 2025-01-01 RX ADMIN — GLYCOPYRROLATE 0.2 MILLIGRAM(S): 0.2 INJECTION INTRAMUSCULAR; INTRAVENOUS at 09:38

## 2025-01-01 RX ADMIN — PROPOFOL 3.9 MICROGRAM(S)/KG/MIN: 10 INJECTION, EMULSION INTRAVENOUS at 19:40

## 2025-01-01 RX ADMIN — Medication 40 MILLIGRAM(S): at 11:20

## 2025-01-01 RX ADMIN — Medication 1 DROP(S): at 09:38

## 2025-01-01 RX ADMIN — Medication 25 GRAM(S): at 04:00

## 2025-01-01 RX ADMIN — Medication 400 MILLIGRAM(S): at 05:10

## 2025-01-01 RX ADMIN — METOPROLOL SUCCINATE 25 MILLIGRAM(S): 50 TABLET, EXTENDED RELEASE ORAL at 05:10

## 2025-01-01 RX ADMIN — Medication 25 MILLIGRAM(S): at 13:31

## 2025-01-01 RX ADMIN — Medication 4 MILLIGRAM(S): at 20:15

## 2025-01-01 RX ADMIN — SCOPOLAMINE 1 PATCH: 1 PATCH, EXTENDED RELEASE TRANSDERMAL at 19:18

## 2025-01-01 RX ADMIN — Medication 10 MILLIGRAM(S): at 05:10

## 2025-01-01 RX ADMIN — Medication 4 MILLIGRAM(S): at 19:31

## 2025-01-01 RX ADMIN — Medication 25 MILLIGRAM(S): at 14:57

## 2025-01-01 RX ADMIN — Medication 25 MILLIGRAM(S): at 23:09

## 2025-01-01 RX ADMIN — PROPOFOL 3.9 MICROGRAM(S)/KG/MIN: 10 INJECTION, EMULSION INTRAVENOUS at 16:54

## 2025-01-01 RX ADMIN — ISOSORBDIE DINITRATE 10 MILLIGRAM(S): 30 TABLET ORAL at 11:08

## 2025-01-01 RX ADMIN — HEPARIN SODIUM 0 UNIT(S)/HR: 1000 INJECTION INTRAVENOUS; SUBCUTANEOUS at 12:25

## 2025-01-01 RX ADMIN — PROPOFOL 3.9 MICROGRAM(S)/KG/MIN: 10 INJECTION, EMULSION INTRAVENOUS at 22:49

## 2025-01-01 RX ADMIN — Medication 1 DROP(S): at 08:05

## 2025-01-01 RX ADMIN — POLYETHYLENE GLYCOL 3350 17 GRAM(S): 17 POWDER, FOR SOLUTION ORAL at 14:27

## 2025-01-01 RX ADMIN — APIXABAN 5 MILLIGRAM(S): 2.5 TABLET, FILM COATED ORAL at 18:14

## 2025-01-01 RX ADMIN — Medication 100 MICROGRAM(S): at 19:52

## 2025-01-01 RX ADMIN — LACOSAMIDE 300 MILLIGRAM(S): 150 TABLET, FILM COATED ORAL at 05:12

## 2025-01-01 RX ADMIN — SCOPOLAMINE 1 PATCH: 1 PATCH, EXTENDED RELEASE TRANSDERMAL at 19:45

## 2025-01-01 RX ADMIN — APIXABAN 5 MILLIGRAM(S): 2.5 TABLET, FILM COATED ORAL at 05:13

## 2025-01-01 RX ADMIN — AMIODARONE HYDROCHLORIDE 33.3 MG/MIN: 50 INJECTION, SOLUTION INTRAVENOUS at 22:49

## 2025-01-01 RX ADMIN — Medication 650 MILLIGRAM(S): at 00:00

## 2025-01-01 RX ADMIN — Medication 1000 MILLIGRAM(S): at 18:50

## 2025-01-01 RX ADMIN — Medication 15 MILLILITER(S): at 05:38

## 2025-01-01 RX ADMIN — Medication 1 DROP(S): at 20:01

## 2025-01-01 RX ADMIN — LACOSAMIDE 200 MILLIGRAM(S): 150 TABLET, FILM COATED ORAL at 18:40

## 2025-01-01 RX ADMIN — LACOSAMIDE 300 MILLIGRAM(S): 150 TABLET, FILM COATED ORAL at 05:41

## 2025-01-01 RX ADMIN — Medication 40 MILLIEQUIVALENT(S): at 08:33

## 2025-01-01 RX ADMIN — Medication 2 TABLET(S): at 22:05

## 2025-01-01 RX ADMIN — Medication 100 MICROGRAM(S): at 20:01

## 2025-01-01 RX ADMIN — IPRATROPIUM BROMIDE AND ALBUTEROL SULFATE 3 MILLILITER(S): .5; 2.5 SOLUTION RESPIRATORY (INHALATION) at 03:11

## 2025-01-01 RX ADMIN — METOPROLOL SUCCINATE 25 MILLIGRAM(S): 50 TABLET, EXTENDED RELEASE ORAL at 11:19

## 2025-01-01 RX ADMIN — Medication 100 MICROGRAM(S): at 20:08

## 2025-01-01 RX ADMIN — Medication 200 GRAM(S): at 22:49

## 2025-01-01 RX ADMIN — IPRATROPIUM BROMIDE AND ALBUTEROL SULFATE 3 MILLILITER(S): .5; 2.5 SOLUTION RESPIRATORY (INHALATION) at 07:01

## 2025-01-01 RX ADMIN — Medication 25 GRAM(S): at 12:42

## 2025-01-01 RX ADMIN — Medication 25 MILLIGRAM(S): at 14:19

## 2025-01-01 RX ADMIN — Medication 1 APPLICATION(S): at 05:24

## 2025-01-01 RX ADMIN — Medication 1 DROP(S): at 23:14

## 2025-01-01 RX ADMIN — APIXABAN 5 MILLIGRAM(S): 2.5 TABLET, FILM COATED ORAL at 21:18

## 2025-01-01 RX ADMIN — Medication 25 MILLIGRAM(S): at 21:18

## 2025-01-01 RX ADMIN — SCOPOLAMINE 1 PATCH: 1 PATCH, EXTENDED RELEASE TRANSDERMAL at 21:18

## 2025-01-01 RX ADMIN — ISOSORBDIE DINITRATE 10 MILLIGRAM(S): 30 TABLET ORAL at 17:07

## 2025-01-01 RX ADMIN — Medication 1 DROP(S): at 01:36

## 2025-01-01 RX ADMIN — PROPOFOL 3.9 MICROGRAM(S)/KG/MIN: 10 INJECTION, EMULSION INTRAVENOUS at 19:32

## 2025-01-01 RX ADMIN — LACOSAMIDE 300 MILLIGRAM(S): 150 TABLET, FILM COATED ORAL at 18:15

## 2025-01-01 RX ADMIN — HEPARIN SODIUM 10 UNIT(S)/HR: 1000 INJECTION INTRAVENOUS; SUBCUTANEOUS at 11:04

## 2025-01-01 RX ADMIN — Medication 1000 MILLIGRAM(S): at 11:17

## 2025-01-01 RX ADMIN — Medication 2 MILLIGRAM(S): at 19:38

## 2025-01-01 RX ADMIN — GLYCOPYRROLATE 0.2 MILLIGRAM(S): 0.2 INJECTION INTRAMUSCULAR; INTRAVENOUS at 19:40

## 2025-01-01 RX ADMIN — Medication 1 DROP(S): at 04:01

## 2025-01-01 RX ADMIN — Medication 15 MILLILITER(S): at 05:59

## 2025-01-01 RX ADMIN — GLYCOPYRROLATE 0.2 MILLIGRAM(S): 0.2 INJECTION INTRAMUSCULAR; INTRAVENOUS at 21:18

## 2025-01-01 RX ADMIN — Medication 4 MILLIGRAM(S): at 19:17

## 2025-01-01 RX ADMIN — Medication 1 PACKET(S): at 21:20

## 2025-01-01 RX ADMIN — Medication 25 GRAM(S): at 11:11

## 2025-01-01 RX ADMIN — Medication 15 MILLILITER(S): at 05:11

## 2025-01-01 RX ADMIN — IPRATROPIUM BROMIDE AND ALBUTEROL SULFATE 3 MILLILITER(S): .5; 2.5 SOLUTION RESPIRATORY (INHALATION) at 03:47

## 2025-01-01 RX ADMIN — Medication 15 MILLILITER(S): at 17:20

## 2025-01-01 RX ADMIN — IPRATROPIUM BROMIDE AND ALBUTEROL SULFATE 3 MILLILITER(S): .5; 2.5 SOLUTION RESPIRATORY (INHALATION) at 21:18

## 2025-01-01 RX ADMIN — Medication 1 DROP(S): at 22:01

## 2025-01-01 RX ADMIN — Medication 25 GRAM(S): at 19:40

## 2025-01-01 RX ADMIN — Medication 100 MICROGRAM(S): at 20:05

## 2025-01-01 RX ADMIN — Medication 1 DROP(S): at 05:39

## 2025-01-01 RX ADMIN — METOPROLOL SUCCINATE 25 MILLIGRAM(S): 50 TABLET, EXTENDED RELEASE ORAL at 18:14

## 2025-01-01 RX ADMIN — Medication 1 DROP(S): at 04:00

## 2025-01-01 RX ADMIN — Medication 25 MILLIGRAM(S): at 05:24

## 2025-01-01 RX ADMIN — Medication 4 MILLIGRAM(S): at 20:22

## 2025-01-01 RX ADMIN — ISOSORBDIE DINITRATE 10 MILLIGRAM(S): 30 TABLET ORAL at 04:27

## 2025-01-01 RX ADMIN — Medication 1 DROP(S): at 18:10

## 2025-01-01 RX ADMIN — Medication 25 GRAM(S): at 23:11

## 2025-01-01 RX ADMIN — PROPOFOL 3.9 MICROGRAM(S)/KG/MIN: 10 INJECTION, EMULSION INTRAVENOUS at 18:31

## 2025-01-01 RX ADMIN — Medication 4 MILLIGRAM(S): at 20:16

## 2025-01-01 RX ADMIN — SCOPOLAMINE 1 PATCH: 1 PATCH, EXTENDED RELEASE TRANSDERMAL at 21:43

## 2025-01-01 RX ADMIN — Medication 2 MILLIGRAM(S): at 19:21

## 2025-01-01 RX ADMIN — Medication 100 MICROGRAM(S): at 19:45

## 2025-01-01 RX ADMIN — GLYCOPYRROLATE 0.2 MILLIGRAM(S): 0.2 INJECTION INTRAMUSCULAR; INTRAVENOUS at 00:38

## 2025-01-01 RX ADMIN — Medication 4 MILLIGRAM(S): at 19:29

## 2025-01-01 RX ADMIN — Medication 40 MILLIGRAM(S): at 11:04

## 2025-01-01 RX ADMIN — Medication 4 MILLIGRAM(S): at 20:13

## 2025-01-01 RX ADMIN — ISOSORBDIE DINITRATE 10 MILLIGRAM(S): 30 TABLET ORAL at 11:44

## 2025-01-01 RX ADMIN — Medication 25 GRAM(S): at 02:35

## 2025-01-01 RX ADMIN — Medication 1 DROP(S): at 15:48

## 2025-01-01 RX ADMIN — Medication 1 DROP(S): at 23:43

## 2025-01-01 RX ADMIN — Medication 1 APPLICATION(S): at 06:08

## 2025-01-01 RX ADMIN — Medication 25 GRAM(S): at 11:36

## 2025-01-01 RX ADMIN — LACOSAMIDE 300 MILLIGRAM(S): 150 TABLET, FILM COATED ORAL at 17:09

## 2025-01-01 RX ADMIN — Medication 100 MICROGRAM(S): at 20:09

## 2025-01-01 RX ADMIN — Medication 40 MILLIEQUIVALENT(S): at 05:13

## 2025-01-01 RX ADMIN — Medication 25 MILLIGRAM(S): at 14:11

## 2025-01-01 RX ADMIN — HEPARIN SODIUM 11 UNIT(S)/HR: 1000 INJECTION INTRAVENOUS; SUBCUTANEOUS at 03:09

## 2025-01-01 RX ADMIN — Medication 1 DROP(S): at 06:00

## 2025-01-01 RX ADMIN — FUROSEMIDE 40 MILLIGRAM(S): 10 INJECTION INTRAMUSCULAR; INTRAVENOUS at 23:13

## 2025-01-01 RX ADMIN — ISOSORBDIE DINITRATE 10 MILLIGRAM(S): 30 TABLET ORAL at 05:19

## 2025-01-01 RX ADMIN — GLYCOPYRROLATE 0.2 MILLIGRAM(S): 0.2 INJECTION INTRAMUSCULAR; INTRAVENOUS at 16:30

## 2025-01-01 RX ADMIN — Medication 4 MILLIGRAM(S): at 19:37

## 2025-01-01 RX ADMIN — Medication 4 MILLIGRAM(S): at 19:16

## 2025-01-01 RX ADMIN — ISOSORBDIE DINITRATE 10 MILLIGRAM(S): 30 TABLET ORAL at 11:20

## 2025-01-01 RX ADMIN — Medication 25 MILLIGRAM(S): at 21:42

## 2025-01-01 RX ADMIN — METOPROLOL SUCCINATE 25 MILLIGRAM(S): 50 TABLET, EXTENDED RELEASE ORAL at 05:41

## 2025-01-01 RX ADMIN — Medication 250 MILLIGRAM(S): at 17:48

## 2025-01-01 RX ADMIN — Medication 15 MILLILITER(S): at 17:44

## 2025-01-01 RX ADMIN — Medication 15 MILLILITER(S): at 18:09

## 2025-01-01 RX ADMIN — Medication 1 DROP(S): at 18:44

## 2025-01-01 RX ADMIN — Medication 4 MILLIGRAM(S): at 19:23

## 2025-01-01 RX ADMIN — Medication 100 MICROGRAM(S): at 19:46

## 2025-01-01 RX ADMIN — Medication 25 GRAM(S): at 22:01

## 2025-01-01 RX ADMIN — Medication 2 MILLIGRAM(S): at 19:47

## 2025-01-01 RX ADMIN — GLYCOPYRROLATE 0.2 MILLIGRAM(S): 0.2 INJECTION INTRAMUSCULAR; INTRAVENOUS at 18:00

## 2025-01-01 RX ADMIN — ISOSORBDIE DINITRATE 10 MILLIGRAM(S): 30 TABLET ORAL at 18:08

## 2025-01-01 RX ADMIN — LACOSAMIDE 300 MILLIGRAM(S): 150 TABLET, FILM COATED ORAL at 05:13

## 2025-01-01 RX ADMIN — LACOSAMIDE 300 MILLIGRAM(S): 150 TABLET, FILM COATED ORAL at 05:24

## 2025-01-01 RX ADMIN — Medication 4 MILLIGRAM(S): at 20:11

## 2025-01-01 RX ADMIN — SCOPOLAMINE 1 PATCH: 1 PATCH, EXTENDED RELEASE TRANSDERMAL at 21:37

## 2025-01-01 RX ADMIN — METOPROLOL SUCCINATE 25 MILLIGRAM(S): 50 TABLET, EXTENDED RELEASE ORAL at 06:08

## 2025-01-01 RX ADMIN — Medication 4 MILLIGRAM(S): at 19:35

## 2025-01-01 RX ADMIN — ISOSORBDIE DINITRATE 10 MILLIGRAM(S): 30 TABLET ORAL at 17:45

## 2025-01-01 RX ADMIN — Medication 25 GRAM(S): at 03:21

## 2025-01-01 RX ADMIN — LACOSAMIDE 300 MILLIGRAM(S): 150 TABLET, FILM COATED ORAL at 05:59

## 2025-01-01 RX ADMIN — Medication 400 MILLIGRAM(S): at 18:39

## 2025-01-01 RX ADMIN — Medication 1 DROP(S): at 02:00

## 2025-01-01 RX ADMIN — SCOPOLAMINE 1 PATCH: 1 PATCH, EXTENDED RELEASE TRANSDERMAL at 21:23

## 2025-01-01 RX ADMIN — Medication 1 DROP(S): at 11:13

## 2025-01-01 RX ADMIN — Medication 25 GRAM(S): at 12:05

## 2025-01-01 RX ADMIN — LACOSAMIDE 300 MILLIGRAM(S): 150 TABLET, FILM COATED ORAL at 06:04

## 2025-01-01 RX ADMIN — ALBUMIN (HUMAN) 50 MILLILITER(S): 12.5 INJECTION, SOLUTION INTRAVENOUS at 15:44

## 2025-01-01 RX ADMIN — Medication 200 GRAM(S): at 22:55

## 2025-01-01 RX ADMIN — Medication 4 MILLIGRAM(S): at 20:23

## 2025-01-01 RX ADMIN — Medication 1 DROP(S): at 19:40

## 2025-01-01 RX ADMIN — Medication 1 APPLICATION(S): at 05:14

## 2025-01-01 RX ADMIN — Medication 1 DROP(S): at 12:36

## 2025-01-01 RX ADMIN — Medication 1 DROP(S): at 14:00

## 2025-01-01 RX ADMIN — METOPROLOL SUCCINATE 25 MILLIGRAM(S): 50 TABLET, EXTENDED RELEASE ORAL at 05:19

## 2025-01-01 RX ADMIN — Medication 100 MICROGRAM(S): at 20:07

## 2025-01-01 RX ADMIN — PROPOFOL 3.9 MICROGRAM(S)/KG/MIN: 10 INJECTION, EMULSION INTRAVENOUS at 07:54

## 2025-01-01 RX ADMIN — Medication 2 TABLET(S): at 21:23

## 2025-01-01 RX ADMIN — Medication 15 MILLILITER(S): at 17:37

## 2025-01-01 RX ADMIN — Medication 4 MILLIGRAM(S): at 19:59

## 2025-01-01 RX ADMIN — Medication 100 MICROGRAM(S): at 20:02

## 2025-01-01 RX ADMIN — Medication 20 MILLIEQUIVALENT(S): at 05:11

## 2025-01-01 RX ADMIN — ISOSORBDIE DINITRATE 10 MILLIGRAM(S): 30 TABLET ORAL at 18:18

## 2025-01-01 RX ADMIN — Medication 400 MILLIGRAM(S): at 16:42

## 2025-01-01 RX ADMIN — Medication 25 MILLIGRAM(S): at 21:23

## 2025-01-01 RX ADMIN — Medication 1000 MILLIGRAM(S): at 20:40

## 2025-01-01 RX ADMIN — Medication 2 MILLIGRAM(S): at 19:25

## 2025-01-01 RX ADMIN — PROPOFOL 3.9 MICROGRAM(S)/KG/MIN: 10 INJECTION, EMULSION INTRAVENOUS at 10:06

## 2025-01-01 RX ADMIN — Medication 250 MILLIGRAM(S): at 00:13

## 2025-01-01 RX ADMIN — GLYCOPYRROLATE 0.2 MILLIGRAM(S): 0.2 INJECTION INTRAMUSCULAR; INTRAVENOUS at 17:09

## 2025-01-01 RX ADMIN — Medication 100 MICROGRAM(S): at 19:59

## 2025-01-01 RX ADMIN — LACOSAMIDE 300 MILLIGRAM(S): 150 TABLET, FILM COATED ORAL at 17:10

## 2025-01-01 RX ADMIN — IPRATROPIUM BROMIDE AND ALBUTEROL SULFATE 3 MILLILITER(S): .5; 2.5 SOLUTION RESPIRATORY (INHALATION) at 22:51

## 2025-01-01 RX ADMIN — Medication 100 MICROGRAM(S): at 19:56

## 2025-01-01 RX ADMIN — Medication 15 MILLILITER(S): at 17:49

## 2025-01-01 RX ADMIN — Medication 400 MILLIGRAM(S): at 15:08

## 2025-01-01 RX ADMIN — Medication 40 MILLIGRAM(S): at 11:08

## 2025-01-01 RX ADMIN — Medication 2 TABLET(S): at 21:18

## 2025-01-01 RX ADMIN — HEPARIN SODIUM 1100 UNIT(S)/HR: 1000 INJECTION INTRAVENOUS; SUBCUTANEOUS at 03:42

## 2025-01-01 RX ADMIN — Medication 1.3 MG/KG/HR: at 10:07

## 2025-01-01 RX ADMIN — PROPOFOL 3.9 MICROGRAM(S)/KG/MIN: 10 INJECTION, EMULSION INTRAVENOUS at 19:29

## 2025-01-01 RX ADMIN — Medication 650 MILLIGRAM(S): at 00:51

## 2025-01-01 RX ADMIN — Medication 1 DROP(S): at 03:21

## 2025-01-01 RX ADMIN — Medication 4 MILLIGRAM(S): at 19:36

## 2025-01-01 RX ADMIN — Medication 1000 MILLIGRAM(S): at 15:38

## 2025-01-01 RX ADMIN — ALBUMIN (HUMAN) 50 MILLILITER(S): 12.5 INJECTION, SOLUTION INTRAVENOUS at 22:05

## 2025-01-01 RX ADMIN — IPRATROPIUM BROMIDE AND ALBUTEROL SULFATE 3 MILLILITER(S): .5; 2.5 SOLUTION RESPIRATORY (INHALATION) at 09:57

## 2025-01-01 RX ADMIN — SCOPOLAMINE 1 PATCH: 1 PATCH, EXTENDED RELEASE TRANSDERMAL at 08:01

## 2025-01-01 RX ADMIN — Medication 25 MILLIGRAM(S): at 06:04

## 2025-01-01 RX ADMIN — ISOSORBDIE DINITRATE 10 MILLIGRAM(S): 30 TABLET ORAL at 11:42

## 2025-01-01 RX ADMIN — METOPROLOL SUCCINATE 25 MILLIGRAM(S): 50 TABLET, EXTENDED RELEASE ORAL at 17:07

## 2025-01-01 RX ADMIN — Medication 40 MILLIGRAM(S): at 11:27

## 2025-01-01 RX ADMIN — Medication 650 MILLIGRAM(S): at 00:56

## 2025-01-01 RX ADMIN — Medication 40 MILLIGRAM(S): at 11:36

## 2025-01-01 RX ADMIN — Medication 15 MILLILITER(S): at 05:09

## 2025-01-01 RX ADMIN — LACOSAMIDE 300 MILLIGRAM(S): 150 TABLET, FILM COATED ORAL at 06:27

## 2025-01-01 RX ADMIN — Medication 100 MICROGRAM(S): at 19:54

## 2025-01-01 RX ADMIN — Medication 4 MILLIGRAM(S): at 19:34

## 2025-01-01 RX ADMIN — Medication 15 MILLILITER(S): at 05:13

## 2025-01-01 RX ADMIN — ISOSORBDIE DINITRATE 10 MILLIGRAM(S): 30 TABLET ORAL at 12:42

## 2025-01-01 RX ADMIN — Medication 25 MILLIGRAM(S): at 22:45

## 2025-01-01 RX ADMIN — Medication 4 MILLIGRAM(S): at 19:28

## 2025-01-01 RX ADMIN — Medication 25 MILLIGRAM(S): at 14:48

## 2025-01-01 RX ADMIN — Medication 40 MILLIGRAM(S): at 12:41

## 2025-01-01 RX ADMIN — Medication 40 MILLIGRAM(S): at 11:12

## 2025-01-01 RX ADMIN — Medication 25 MILLIGRAM(S): at 05:38

## 2025-01-01 RX ADMIN — SCOPOLAMINE 1 PATCH: 1 PATCH, EXTENDED RELEASE TRANSDERMAL at 12:29

## 2025-01-01 RX ADMIN — Medication 1 DROP(S): at 08:30

## 2025-01-01 RX ADMIN — Medication 25 GRAM(S): at 23:40

## 2025-01-01 RX ADMIN — Medication 1 DROP(S): at 05:42

## 2025-01-01 RX ADMIN — GLYCOPYRROLATE 0.2 MILLIGRAM(S): 0.2 INJECTION INTRAMUSCULAR; INTRAVENOUS at 10:55

## 2025-01-01 RX ADMIN — SCOPOLAMINE 1 PATCH: 1 PATCH, EXTENDED RELEASE TRANSDERMAL at 08:38

## 2025-01-01 RX ADMIN — Medication 1 DROP(S): at 17:20

## 2025-01-01 RX ADMIN — Medication 650 MILLIGRAM(S): at 23:15

## 2025-01-01 RX ADMIN — GLYCOPYRROLATE 0.2 MILLIGRAM(S): 0.2 INJECTION INTRAMUSCULAR; INTRAVENOUS at 20:08

## 2025-01-01 RX ADMIN — Medication 4 MILLIGRAM(S): at 19:14

## 2025-01-01 RX ADMIN — Medication 4 MILLIGRAM(S): at 19:18

## 2025-01-01 RX ADMIN — ISOSORBDIE DINITRATE 10 MILLIGRAM(S): 30 TABLET ORAL at 10:53

## 2025-01-01 RX ADMIN — ISOSORBDIE DINITRATE 10 MILLIGRAM(S): 30 TABLET ORAL at 06:04

## 2025-01-01 RX ADMIN — Medication 4 MILLIGRAM(S): at 19:15

## 2025-01-01 RX ADMIN — METOPROLOL SUCCINATE 25 MILLIGRAM(S): 50 TABLET, EXTENDED RELEASE ORAL at 17:31

## 2025-01-01 RX ADMIN — Medication 1 DROP(S): at 01:49

## 2025-01-01 RX ADMIN — Medication 25 GRAM(S): at 23:33

## 2025-01-01 RX ADMIN — Medication 25 MILLIGRAM(S): at 22:05

## 2025-01-01 RX ADMIN — PROPOFOL 3.9 MICROGRAM(S)/KG/MIN: 10 INJECTION, EMULSION INTRAVENOUS at 04:40

## 2025-01-01 RX ADMIN — Medication 25 MILLIGRAM(S): at 05:59

## 2025-01-01 RX ADMIN — LACOSAMIDE 300 MILLIGRAM(S): 150 TABLET, FILM COATED ORAL at 06:14

## 2025-01-01 RX ADMIN — Medication 1 DROP(S): at 08:33

## 2025-01-01 RX ADMIN — ISOSORBDIE DINITRATE 10 MILLIGRAM(S): 30 TABLET ORAL at 10:22

## 2025-01-01 RX ADMIN — Medication 25 GRAM(S): at 11:24

## 2025-01-01 RX ADMIN — Medication 25 GRAM(S): at 11:04

## 2025-01-01 RX ADMIN — Medication 1000 MILLIGRAM(S): at 05:25

## 2025-01-01 RX ADMIN — GLYCOPYRROLATE 0.2 MILLIGRAM(S): 0.2 INJECTION INTRAMUSCULAR; INTRAVENOUS at 19:36

## 2025-01-01 RX ADMIN — Medication 1 DROP(S): at 12:40

## 2025-01-01 RX ADMIN — ISOSORBDIE DINITRATE 10 MILLIGRAM(S): 30 TABLET ORAL at 10:27

## 2025-01-01 RX ADMIN — Medication 1 APPLICATION(S): at 05:05

## 2025-01-01 RX ADMIN — Medication 2 MILLIGRAM(S): at 19:50

## 2025-01-01 RX ADMIN — APIXABAN 5 MILLIGRAM(S): 2.5 TABLET, FILM COATED ORAL at 11:27

## 2025-01-01 RX ADMIN — Medication 4 MILLIGRAM(S): at 18:41

## 2025-01-01 RX ADMIN — ACETAZOLAMIDE 500 MILLIGRAM(S): 250 TABLET ORAL at 19:40

## 2025-01-01 RX ADMIN — METOPROLOL SUCCINATE 25 MILLIGRAM(S): 50 TABLET, EXTENDED RELEASE ORAL at 17:20

## 2025-01-01 RX ADMIN — PROPOFOL 3.9 MICROGRAM(S)/KG/MIN: 10 INJECTION, EMULSION INTRAVENOUS at 22:05

## 2025-01-01 RX ADMIN — Medication 1 APPLICATION(S): at 05:11

## 2025-01-01 RX ADMIN — Medication 25 MILLIGRAM(S): at 05:13

## 2025-01-01 RX ADMIN — Medication 40 MILLIGRAM(S): at 11:42

## 2025-01-01 RX ADMIN — ISOSORBDIE DINITRATE 10 MILLIGRAM(S): 30 TABLET ORAL at 05:41

## 2025-01-01 RX ADMIN — Medication 1 DROP(S): at 12:05

## 2025-01-01 RX ADMIN — POTASSIUM PHOSPHATE, MONOBASIC POTASSIUM PHOSPHATE, DIBASIC INJECTION, 62.5 MILLIMOLE(S): 236; 224 SOLUTION, CONCENTRATE INTRAVENOUS at 06:05

## 2025-01-01 RX ADMIN — Medication 100 MICROGRAM(S): at 20:04

## 2025-01-01 RX ADMIN — GLYCOPYRROLATE 0.2 MILLIGRAM(S): 0.2 INJECTION INTRAMUSCULAR; INTRAVENOUS at 23:11

## 2025-01-01 RX ADMIN — SCOPOLAMINE 1 PATCH: 1 PATCH, EXTENDED RELEASE TRANSDERMAL at 19:41

## 2025-01-01 RX ADMIN — ISOSORBDIE DINITRATE 10 MILLIGRAM(S): 30 TABLET ORAL at 17:09

## 2025-01-01 RX ADMIN — Medication 100 MICROGRAM(S): at 19:57

## 2025-01-01 RX ADMIN — PROPOFOL 3.9 MICROGRAM(S)/KG/MIN: 10 INJECTION, EMULSION INTRAVENOUS at 18:16

## 2025-01-01 RX ADMIN — Medication 40 MILLIGRAM(S): at 12:05

## 2025-01-01 RX ADMIN — SCOPOLAMINE 1 PATCH: 1 PATCH, EXTENDED RELEASE TRANSDERMAL at 21:22

## 2025-01-01 RX ADMIN — Medication 100 GRAM(S): at 04:40

## 2025-01-01 RX ADMIN — ISOSORBDIE DINITRATE 10 MILLIGRAM(S): 30 TABLET ORAL at 14:02

## 2025-01-01 RX ADMIN — Medication 25 GRAM(S): at 04:41

## 2025-01-01 RX ADMIN — Medication 25 MILLIGRAM(S): at 05:41

## 2025-01-01 RX ADMIN — Medication 40 MILLIGRAM(S): at 11:44

## 2025-01-01 RX ADMIN — Medication 25 GRAM(S): at 11:07

## 2025-01-01 RX ADMIN — SCOPOLAMINE 1 PATCH: 1 PATCH, EXTENDED RELEASE TRANSDERMAL at 19:08

## 2025-01-01 RX ADMIN — LACOSAMIDE 300 MILLIGRAM(S): 150 TABLET, FILM COATED ORAL at 05:38

## 2025-01-01 RX ADMIN — ISOSORBDIE DINITRATE 10 MILLIGRAM(S): 30 TABLET ORAL at 05:13

## 2025-01-01 RX ADMIN — Medication 1 DROP(S): at 15:46

## 2025-01-01 RX ADMIN — Medication 15 MILLILITER(S): at 17:22

## 2025-01-01 RX ADMIN — Medication 15 MILLILITER(S): at 05:39

## 2025-01-01 RX ADMIN — Medication 4 MILLIGRAM(S): at 20:17

## 2025-01-01 RX ADMIN — Medication 1 APPLICATION(S): at 05:42

## 2025-01-01 RX ADMIN — ISOSORBDIE DINITRATE 10 MILLIGRAM(S): 30 TABLET ORAL at 05:46

## 2025-01-01 RX ADMIN — Medication 25 GRAM(S): at 23:15

## 2025-01-01 RX ADMIN — Medication 25 GRAM(S): at 11:12

## 2025-01-01 RX ADMIN — ISOSORBDIE DINITRATE 10 MILLIGRAM(S): 30 TABLET ORAL at 05:59

## 2025-01-01 RX ADMIN — METOPROLOL SUCCINATE 25 MILLIGRAM(S): 50 TABLET, EXTENDED RELEASE ORAL at 05:24

## 2025-01-01 RX ADMIN — Medication 4 MILLIGRAM(S): at 19:38

## 2025-01-01 RX ADMIN — METOPROLOL SUCCINATE 25 MILLIGRAM(S): 50 TABLET, EXTENDED RELEASE ORAL at 05:38

## 2025-01-01 RX ADMIN — Medication 1 DROP(S): at 12:42

## 2025-01-01 RX ADMIN — LACOSAMIDE 300 MILLIGRAM(S): 150 TABLET, FILM COATED ORAL at 17:32

## 2025-01-01 RX ADMIN — Medication 100 MICROGRAM(S): at 19:55

## 2025-01-01 RX ADMIN — SCOPOLAMINE 1 PATCH: 1 PATCH, EXTENDED RELEASE TRANSDERMAL at 19:04

## 2025-01-01 RX ADMIN — Medication 1 DROP(S): at 18:07

## 2025-01-01 RX ADMIN — IPRATROPIUM BROMIDE AND ALBUTEROL SULFATE 3 MILLILITER(S): .5; 2.5 SOLUTION RESPIRATORY (INHALATION) at 04:40

## 2025-01-01 RX ADMIN — Medication 25 MILLIGRAM(S): at 05:19

## 2025-01-01 RX ADMIN — Medication 25 MILLIGRAM(S): at 05:46

## 2025-01-01 RX ADMIN — Medication 1 DROP(S): at 10:08

## 2025-01-01 RX ADMIN — PROPOFOL 3.9 MICROGRAM(S)/KG/MIN: 10 INJECTION, EMULSION INTRAVENOUS at 04:15

## 2025-01-01 RX ADMIN — Medication 1 APPLICATION(S): at 06:31

## 2025-01-01 RX ADMIN — Medication 650 MILLIGRAM(S): at 23:45

## 2025-01-01 RX ADMIN — ISOSORBDIE DINITRATE 10 MILLIGRAM(S): 30 TABLET ORAL at 05:38

## 2025-01-01 RX ADMIN — SCOPOLAMINE 1 PATCH: 1 PATCH, EXTENDED RELEASE TRANSDERMAL at 08:34

## 2025-01-01 RX ADMIN — Medication 100 MICROGRAM(S): at 19:41

## 2025-01-01 RX ADMIN — Medication 4 MILLIGRAM(S): at 20:14

## 2025-01-01 RX ADMIN — Medication 4 MILLIGRAM(S): at 19:33

## 2025-01-01 RX ADMIN — Medication 25 GRAM(S): at 11:45

## 2025-01-01 RX ADMIN — Medication 4 MILLIGRAM(S): at 19:30

## 2025-01-01 RX ADMIN — Medication 40 MILLIEQUIVALENT(S): at 06:22

## 2025-01-01 RX ADMIN — Medication 25 MILLIGRAM(S): at 14:06

## 2025-01-01 RX ADMIN — Medication 4 MILLIGRAM(S): at 20:12

## 2025-01-01 RX ADMIN — IPRATROPIUM BROMIDE AND ALBUTEROL SULFATE 3 MILLILITER(S): .5; 2.5 SOLUTION RESPIRATORY (INHALATION) at 14:40

## 2025-01-01 RX ADMIN — METOPROLOL SUCCINATE 25 MILLIGRAM(S): 50 TABLET, EXTENDED RELEASE ORAL at 17:09

## 2025-01-01 RX ADMIN — Medication 15 MILLILITER(S): at 17:09

## 2025-01-01 RX ADMIN — Medication 400 MILLIGRAM(S): at 07:54

## 2025-01-01 RX ADMIN — Medication 400 MILLIGRAM(S): at 20:25

## 2025-01-01 RX ADMIN — Medication 15 MILLILITER(S): at 17:08

## 2025-01-01 RX ADMIN — Medication 15 MILLILITER(S): at 17:07

## 2025-01-01 RX ADMIN — Medication 15 MILLILITER(S): at 06:14

## 2025-01-01 RX ADMIN — METOPROLOL SUCCINATE 25 MILLIGRAM(S): 50 TABLET, EXTENDED RELEASE ORAL at 18:08

## 2025-01-01 RX ADMIN — HEPARIN SODIUM 5000 UNIT(S): 1000 INJECTION INTRAVENOUS; SUBCUTANEOUS at 03:41

## 2025-01-01 RX ADMIN — ISOSORBDIE DINITRATE 10 MILLIGRAM(S): 30 TABLET ORAL at 11:27

## 2025-01-01 RX ADMIN — LACOSAMIDE 300 MILLIGRAM(S): 150 TABLET, FILM COATED ORAL at 05:20

## 2025-01-01 RX ADMIN — Medication 3.25 MICROGRAM(S)/KG/HR: at 10:07

## 2025-01-01 RX ADMIN — LACOSAMIDE 300 MILLIGRAM(S): 150 TABLET, FILM COATED ORAL at 05:09

## 2025-01-01 RX ADMIN — IPRATROPIUM BROMIDE AND ALBUTEROL SULFATE 3 MILLILITER(S): .5; 2.5 SOLUTION RESPIRATORY (INHALATION) at 09:44

## 2025-01-01 RX ADMIN — Medication 400 MILLIGRAM(S): at 11:04

## 2025-01-01 RX ADMIN — Medication 650 MILLIGRAM(S): at 23:05

## 2025-01-01 RX ADMIN — AMIODARONE HYDROCHLORIDE 16.7 MG/MIN: 50 INJECTION, SOLUTION INTRAVENOUS at 07:33

## 2025-01-01 RX ADMIN — ISOSORBDIE DINITRATE 10 MILLIGRAM(S): 30 TABLET ORAL at 05:11

## 2025-01-01 RX ADMIN — SCOPOLAMINE 1 PATCH: 1 PATCH, EXTENDED RELEASE TRANSDERMAL at 08:19

## 2025-01-01 RX ADMIN — SCOPOLAMINE 1 PATCH: 1 PATCH, EXTENDED RELEASE TRANSDERMAL at 07:59

## 2025-01-01 RX ADMIN — Medication 1 APPLICATION(S): at 05:19

## 2025-01-01 RX ADMIN — Medication 2 TABLET(S): at 21:24

## 2025-01-01 RX ADMIN — Medication 4 MILLIGRAM(S): at 20:10

## 2025-01-01 RX ADMIN — ISOSORBDIE DINITRATE 10 MILLIGRAM(S): 30 TABLET ORAL at 15:58

## 2025-03-27 NOTE — PATIENT PROFILE ADULT - LIVING ENVIRONMENT
Problem: Anxiety (Adult)  Goal: Reduction/Resolution  Patient will verbalize a decrease in anxiety by d/c.   Patient will try one coping skill before using a PRN.   Outcome: No Change  Patient endorsed a small amount of anxiety but denied needing a PRN.     Problem: Additional Goals: Nursing Focus  Goal: 1. Patient Goal: Nursing Focus  Patient has a history of swallowing objects prior to hospitalization.   Patient will not self harm by swallowing objects while inpatient.   Outcome: Improving  Patient did not self harm during this shift.        no

## 2025-03-27 NOTE — PATIENT PROFILE ADULT - FALL HARM RISK - HARM RISK INTERVENTIONS

## 2025-03-27 NOTE — CHART NOTE - NSCHARTNOTEFT_GEN_A_CORE
Removal of Femoral Sheath    Pulses in the lower extremity are palpable. The patient was placed in the supine position. The insertion site was identified and the sutures were removed per protocol.  The femoral sheath was then removed. Direct pressure was applied for 20 minutes.     Monitoring of the groin and both lower extremities including neuro-vascular checks and vital signs every 15 minutes x 4, then every 30 minutes x 2, then every 1 hour was ordered.    Complications: None

## 2025-03-27 NOTE — H&P ADULT - NSHPPHYSICALEXAM_GEN_ALL_CORE
VITALS:   T(C): --  HR: --  BP: --  RR: --  SpO2: --    GENERAL: NAD, lying in bed comfortably  HEAD:  Atraumatic, Normocephalic  EYES: EOMI, PERRLA, conjunctiva and sclera clear  ENT: Moist mucous membranes  NECK: Supple, No JVD  CHEST/LUNG: Clear to auscultation bilaterally; No rales, rhonchi, wheezing, or rubs. Unlabored respirations  HEART: Regular rate and rhythm; No murmurs, rubs, or gallops  ABDOMEN: BSx4; Soft, nontender, nondistended  EXTREMITIES:  2+ Peripheral Pulses, brisk capillary refill. No clubbing, cyanosis, or edema  NERVOUS SYSTEM:  A&Ox3, no focal deficits   SKIN: No rashes or lesions  Psych: Normal speech, normal behavior, normal affect GENERAL: NAD, lying in bed comfortably  HEAD:  Atraumatic, Normocephalic  EYES: EOMI, PERRLA, conjunctiva and sclera clear  ENT: Moist mucous membranes  NECK: Supple, No JVD  CHEST/LUNG: Clear to auscultation bilaterally; No rales, rhonchi, wheezing, or rubs. Unlabored respirations  HEART: Regular rate and rhythm; No murmurs, rubs, or gallops  ABDOMEN: BSx4; Soft, nontender, nondistended  EXTREMITIES:  2+ Peripheral Pulses, brisk capillary refill. No clubbing, cyanosis, or edema  NERVOUS SYSTEM:  A&Ox3, no focal deficits   SKIN: No rashes or lesions  Psych: Normal speech, normal behavior, normal affect Post cardiac arrest    HEAD:  Atraumatic, Normocephalic  EYES: EOMI, PERRLA, conjunctiva and sclera clear  ENT: Moist mucous membranes  NECK: Supple, No JVD  CHEST/LUNG: Clear to auscultation bilaterally; No rales, rhonchi, wheezing, or rubs. Unlabored respirations  HEART: Regular rate and rhythm; No murmurs, rubs, or gallops  ABDOMEN: BSx4; Soft, nontender, nondistended  EXTREMITIES:  2+ Peripheral Pulses, brisk capillary refill. No clubbing, cyanosis, or edema  NERVOUS SYSTEM:  Sedated, unresponsive   SKIN: No rashes or lesions  Psych: Normal speech, normal behavior, normal affect Post cardiac arrest    HEAD:  Atraumatic, Normocephalic  EYES: EOMI, pinpoint pupils, non reactive, conjunctiva and sclera clear. No corneal reflex  ENT: Moist mucous membranes  NECK: Well healed tracheostomy scar   CHEST/LUNG: Clear to auscultation bilaterally; No rales, rhonchi, wheezing, or rubs. Unlabored respirations  HEART: Regular rate and rhythm; No murmurs, rubs, or gallops  ABDOMEN: BSx4; Soft, nontender, nondistended  EXTREMITIES:  2+ Peripheral Pulses, brisk capillary refill. No clubbing, cyanosis, or edema  NERVOUS SYSTEM:  Unresponsive to painful stimuli. No gag reflex, no corneal reflex. Facial myoclonus of chin/mouth  SKIN: No rashes or lesions  Psych: Normal speech, normal behavior, normal affect

## 2025-03-27 NOTE — CONSULT NOTE ADULT - SUBJECTIVE AND OBJECTIVE BOX
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 Since, she has required assistive devices to ambulate along with 1 person assist. In 2021, she was diagnosed with large coronary artery aneurysms as part of a workup for chest tightness. Cardiac CT showed large aneurysmal dilatation located anterior and superior to LV with calcification/thrombus within wall and compressing LA as well as proximal dilatation of RCA. TTE showed normal biventricular function. LHC showed large saccular aneurysm of proximal LM with LAD and LCx coming off confluence and medium localized aneurysm of RCA. Per CT surgery, determined to not be a CABG or transplant candidate. 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     On 3/27 she was at a doctor's appointment and synopsized in the office. When EMS arrived she had no pulse and CPR was initiated, ROSC after 20-25minutes. Unclear downtime prior to initiation of CPR but EMS arrived 20minutes after they were called. Total downtime could be up to 40-45minutes. She was defibrillated for Vfib. Stabilized at [facility] and brought to [facility].  (27 Mar 2025 17:51)      PMHx:   HTN (hypertension)    CVA (cerebrovascular accident)    Coronary artery aneurysm        PSHx:   History of tracheostomy    PEG (percutaneous endoscopic gastrostomy) status        Allergies:  No Known Allergies      Home Meds:    Current Medications:   aMIOdarone Infusion 1 mG/Min IV Continuous <Continuous>  aMIOdarone Infusion 0.5 mG/Min IV Continuous <Continuous>  chlorhexidine 0.12% Liquid 15 milliLiter(s) Oral Mucosa every 12 hours  chlorhexidine 2% Cloths 1 Application(s) Topical <User Schedule>  norepinephrine Infusion 0.05 MICROgram(s)/kG/Min IV Continuous <Continuous>  pantoprazole  Injectable 40 milliGRAM(s) IV Push daily  propofol Infusion 10 MICROgram(s)/kG/Min IV Continuous <Continuous>  vancomycin  IVPB 1000 milliGRAM(s) IV Intermittent once      FAMILY HISTORY:      Social History: Personally reviewed   No tobacco, EtOH or IVDU     REVIEW OF SYSTEMS:  Constitutional:     [x ] negative [ ] fevers [ ] chills [ ] weight loss [ ] weight gain  HEENT:                  [x ] negative [ ] dry eyes [ ] eye irritation [ ] postnasal drip [ ] nasal congestion  CV:                         [ x] negative  [ ] chest pain [ ] orthopnea [ ] palpitations [ ] murmur  Resp:                     [x ] negative [ ] cough [ ] shortness of breath [ ] dyspnea [ ] wheezing [ ] sputum [ ]hemoptysis  GI:                          [ x] negative [ ] nausea [ ] vomiting [ ] diarrhea [ ] constipation [ ] abd pain [ ] dysphagia   :                        [ x] negative [ ] dysuria [ ] nocturia [ ] hematuria [ ] increased urinary frequency  Musculoskeletal: [x ] negative [ ] back pain [ ] myalgias [ ] arthralgias [ ] fracture  Skin:                       [ x] negative [ ] rash [ ] itch  Neurological:        [ x] negative [ ] headache [ ] dizziness [ ] syncope [ ] weakness [ ] numbness  Psychiatric:           [ x] negative [ ] anxiety [ ] depression  Endocrine:            [ x] negative [ ] diabetes [ ] thyroid problem  Heme/Lymph:      [ x] negative [ ] anemia [ ] bleeding problem  Allergic/Immune: [ x] negative [ ] itchy eyes [ ] nasal discharge [ ] hives [ ] angioedema    [ x] All other systems negative  [ ] Unable to assess ROS due to      Physical Exam:  T(F): 95.4 (03-27), Max: 95.4 (03-27)  HR: 74 (03-27) (69 - 92)  BP: 94/78 (03-27) (89/76 - 94/78)  RR: 18 (03-27)  SpO2: 100% (03-27)    Gen: intubated sedated   HENNT: No JVP   CV: regular rate, regular rhtyhm, no murmur   Pulm: clear to auscultation bilaterally   Abdomen: Non-tender, non-distended   Ext: no edema b/l   Neuro: grossly non-focal     Cardiovascular Diagnostic Testing:      Labs: Personally reviewed                        10.8   20.18 )-----------( 174      ( 27 Mar 2025 21:52 )             35.1     03-27    136  |  104  |  30[H]  ----------------------------<  198[H]  4.3   |  14[L]  |  1.56[H]    Ca    8.0[L]      27 Mar 2025 21:52    TPro  6.3  /  Alb  3.4  /  TBili  0.2  /  DBili  x   /  AST  984[H]  /  ALT  867[H]  /  AlkPhos  76  03-27    PT/INR - ( 27 Mar 2025 21:52 )   PT: 12.6 sec;   INR: 1.09 ratio         PTT - ( 27 Mar 2025 21:52 )  PTT:29.0 sec

## 2025-03-27 NOTE — PATIENT PROFILE ADULT - NSTOBACCONEVERSMOKERY/N_GEN_A
Goal Outcome Evaluation:  Plan of Care Reviewed With: patient        Progress: improving  Outcome Evaluation: Bed mobility w/ extra time sba/cga. sit-stand cga/sba. Pt amb to BR sba/cga. Pt then amb 100'x2 rwx cues for safety sba/cga. reviewed home safety and POC. Pt reports has daughters that live with her and will have plenty of help. Feel pt will be safe to d/c home w/ HH when medically stable.   No

## 2025-03-27 NOTE — H&P ADULT - HISTORY OF PRESENT ILLNESS
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 Since, she has required assistive devices to ambulate along with 1 person assist. In 2021, she was diagnosed with large coronary artery aneurysms as part of a workup for chest tightness. Cardiac CT showed large aneurysmal dilatation located anterior and superior to LV with calcification/thrombus within wall and compressing LA as well as proximal dilatation of RCA. TTE showed normal biventricular function. LHC showed large saccular aneurysm of proximal LM with LAD and LCx coming off confluence and medium localized aneurysm of RCA. Per CT surgery, determined to not be a CABG or transplant candidate. 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     She is being transferred from [facility] for cardiac arrest.  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 Since, she has required assistive devices to ambulate along with 1 person assist. In 2021, she was diagnosed with large coronary artery aneurysms as part of a workup for chest tightness. Cardiac CT showed large aneurysmal dilatation located anterior and superior to LV with calcification/thrombus within wall and compressing LA as well as proximal dilatation of RCA. TTE showed normal biventricular function. LHC showed large saccular aneurysm of proximal LM with LAD and LCx coming off confluence and medium localized aneurysm of RCA. Per CT surgery, determined to not be a CABG or transplant candidate. 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     On 3/27 she was at a doctor's appointment and synopsized in the office. When EMS arrived she had no pulse and CPR was initiated, ROSC after 20-25minutes. Unclear downtime prior to initiation of CPR but EMS arrived 20minutes after they were called. Total downtime could be up to 40-45minutes. She was defibrillated for Vfib. Stabilized at [facility] and brought to [facility].

## 2025-03-27 NOTE — PATIENT PROFILE ADULT - TOBACCO USE
infant. Never smoker

## 2025-03-27 NOTE — H&P ADULT - ASSESSMENT
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 Since, she has required assistive devices to ambulate along with 1 person assist. In 2021, she was diagnosed with large coronary artery aneurysms as part of a workup for chest tightness. Cardiac CT showed large aneurysmal dilatation located anterior and superior to LV with calcification/thrombus within wall and compressing LA as well as proximal dilatation of RCA. TTE showed normal biventricular function. LHC showed large saccular aneurysm of proximal LM with LAD and LCx coming off confluence and medium localized aneurysm of RCA. Per CT surgery, determined to not be a CABG or transplant candidate. 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     She is being transferred from [facility] for cardiac arrest.       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 Since, she has required assistive devices to ambulate along with 1 person assist. In 2021, she was diagnosed with large coronary artery aneurysms as part of a workup for chest tightness. Cardiac CT showed large aneurysmal dilatation located anterior and superior to LV with calcification/thrombus within wall and compressing LA as well as proximal dilatation of RCA. TTE showed normal biventricular function. LHC showed large saccular aneurysm of proximal LM with LAD and LCx coming off confluence and medium localized aneurysm of RCA. Per CT surgery, determined to not be a CABG or transplant candidate. 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     On 3/27 she was at a doctor's appointment and synopsized in the office. When EMS arrived she had no pulse and CPR was initiated, ROSC after 20-25minutes. Unclear downtime prior to initiation of CPR but EMS arrived 20minutes after they were called. Total downtime could be up to 40-45minutes. She was defibrillated for Vfib. Stabilized at [facility] and brought to [facility]. She had a LHC on admission, no intervention performed.     NEURO  #Sedated  -Propofol gtt, titrate RASS -2--3  -Will need to assess baseline mental status with prolonged cardiac arrest and unknown downtime  -Baseline: alert and oriented x3, wheelchair bound    PULM  #Respiratory failure due to cardiac arrest  -AC 18/450/100/6  -Check CXR post intubation    CARDS  #Shock state  -Due to cardiac arrest  -Maintain levo gtt, titrate MAP>65  -Check TTE    #Coronary artery saccular aneurysm  -Unclear etiology, possible Kawasaki disease in childhood  -Start on heparin gtt, patient specific protocol  -Previously deemed to be not a transplant or surgical candidate in 2021  -Was maintained on Eliquis and ASA    #Vfib arrest  -Maintain amiodarone gtt    GI  #OGT in place  -NPO        ENDO  -No active issues    ID  -No active issues    HEME  #History of coronary thrombus   -Start heparin gtt  -Outpt: Eliquis/ASA    Ethics: Full code 46 y/o F with PMH of HTN, 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 Since, she has required assistive devices to ambulate along with 1 person assist. In 2021, she was diagnosed with large coronary artery aneurysms as part of a workup for chest tightness. Cardiac CT showed large aneurysmal dilatation located anterior and superior to LV with calcification/thrombus within wall and compressing LA as well as proximal dilatation of RCA. TTE showed normal biventricular function. LHC showed large saccular aneurysm of proximal LM with LAD and LCx coming off confluence and medium localized aneurysm of RCA. Per CT surgery, determined to not be a CABG or transplant candidate. 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     On 3/27 she was at a doctor's appointment and synopsized in the office. When EMS arrived she had no pulse and CPR was initiated, ROSC after 20-25minutes. Unclear downtime prior to initiation of CPR but EMS arrived 20minutes after they were called. Total downtime could be up to 40-45minutes. She was defibrillated for Vfib. Stabilized at [facility] and [facility]. She had a LHC on admission, no intervention performed.     NEURO  #Sedated  -Propofol gtt, turn off  -Will need to assess baseline mental status with prolonged cardiac arrest and unknown downtime  -Baseline: alert and oriented x3, wheelchair bound  -EEG  -CT head    PULM  #Respiratory failure due to cardiac arrest  -AC 18/450/100/6  -Check CXR post intubation    CARDS  #Shock state  -Due to cardiac arrest  -Maintain levo gtt, titrate MAP>65  -Check TTE    #Coronary artery saccular aneurysm  -Unclear etiology, possible Kawasaki disease in childhood  -Start on heparin gtt, patient specific protocol  -Previously deemed to be not a transplant or surgical candidate in 2021  -Was maintained on Eliquis and ASA    #Vfib arrest  -Maintain amiodarone gtt    GI  #OGT in place  -NPO        ENDO  -No active issues    ID  -No active issues    HEME  #History of coronary thrombus   -Start heparin gtt  -Outpt: Eliquis/ASA    Ethics: Full code

## 2025-03-27 NOTE — PATIENT PROFILE ADULT - FUNCTIONAL ASSESSMENT - BASIC MOBILITY 6.
1-calculated by average/Not able to assess (calculate score using [facility] averaging method)

## 2025-03-27 NOTE — PATIENT PROFILE ADULT - PATIENT'S PREFERRED PRONOUN
Gillian Hoffmann)  Orthopaedic Surgery  [address] [place] [address]  Phone: [phone]  Fax: [phone]  Follow Up Time: 1-3 Days  
Her/She

## 2025-03-27 NOTE — H&P ADULT - NSICDXPASTMEDICALHX_GEN_ALL_CORE_FT
PAST MEDICAL HISTORY:  Coronary artery aneurysm     CVA (cerebrovascular accident)     HTN (hypertension)

## 2025-03-27 NOTE — H&P ADULT - NSICDXPASTSURGICALHX_GEN_ALL_CORE_FT
PAST SURGICAL HISTORY:  History of tracheostomy     PEG (percutaneous endoscopic gastrostomy) status

## 2025-03-27 NOTE — PATIENT PROFILE ADULT - FUNCTIONAL ASSESSMENT - DAILY ACTIVITY 6.
-- DO NOT REPLY / DO NOT REPLY ALL --  -- Message is from Engagement Center Operations (ECO) --    General Patient Message: Patients daughter called inn stating that she received a voicemail from a nurse on Doctor Anastasia Reinoso clinical team. States that she was told to call back and confirm she received the voicemaill which she is doing. However she states that she has a few questions in regards to the voicemail that she needed clarification on. Please advise .      Caller Information       Type Contact Phone/Fax    02/28/2023 03:38 PM CST Phone (Incoming) DARIELA GE (POA) (Emergency Contact) [phone]        Alternative phone number: None     Can a detailed message be left? Yes    Message Turnaround:     Is it Working Hours? Yes - Working Hours     [place]:    Please give this turnaround time to the caller:   \"This message will be sent to [state Provider's name]. The clinical team will fulfill your request as soon as they review your message.\"                
Called and spoke to mesha's daughter  she has some questions regarding the need for the isoenzymes lab tests that needs to be completed  fasting. informed her the reason for the isoenzymes lab results is to find out where the elevation is coming from causing the alk phos to be elevated and yes please fast 8 to 12 hours water only  cm  
1 = Total assistance

## 2025-03-28 ENCOUNTER — APPOINTMENT (OUTPATIENT)
Dept: ULTRASOUND IMAGING | Facility: CLINIC | Age: 45
End: 2025-03-28

## 2025-03-28 ENCOUNTER — RESULT REVIEW (OUTPATIENT)
Age: 45
End: 2025-03-28

## 2025-03-28 NOTE — CHART NOTE - NSCHARTNOTEFT_GEN_A_CORE
EEG Preliminary Report, first 2 hours    Frequent bifrontal seizures and lateralized rhythmic delta activity (LRDA).  Discussed with neurology team.    Final report to follow.    Cleopatra Aparicio MD

## 2025-03-28 NOTE — CONSULT NOTE ADULT - ATTENDING COMMENTS
ASSESSMENT:  Mrs. 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 On 3/27, pt had witnessed out of hospital cardiac arrest, suspected total down-time ~45 minutes, shocked x1 for Vfib. Course complicated by shock physiology ongoing leukocytosis, TISH, transaminitis, florid UTI.          Diag:  [] MRI brain wo contrast 5-7 days post arrest (04/1/25 - 04/03/25)  [] STAT repeat CTH wo contrast for acute change in neurologic status  [] neuron specific enolase 24-48 hours and 72 hours post arrest  ---1s NSE: arrest occurred on 3/27. Please collect NSE some time between 1230 3/28 and 1230 03/29  ---2nd NSE: Please collect NSE some time on 03/30  [] vEEG ASSESSMENT:  Mrs. 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 On 3/27, pt had witnessed out of hospital cardiac arrest, suspected total down-time ~45 minutes, shocked x1 for Vfib. Course complicated by shock physiology ongoing leukocytosis, TISH, transaminitis, florid UTI.    Prognosis is guarded at this time. CT scan read by radiology as w/o clear evidence of cortical injury. Patient is relatively younger so her scan can be harder to interpret given preserved brain volume. Sulci are not clearly visualized and ventricles do seem small/slit like. Gray white matter differentiation is somewhat difficult to make out, but could be limited by quality of scan. Patient's potential 45 minutes of hypoperfusion and visible myoclonus and vertical pendular nystagmus portend poor prognosis. Further studies will help better inform prognosis as below:      Diag:  [] MRI brain w/o contrast ideally ~5 days post arrest (04/1/25)          - If difficult to obtain, would plan for repeat [facility] Sunday vs Monday  [] neuron specific enolase 24-48 hours and 72 hours post arrest  ---1s NSE: arrest occurred on 3/27. Please collect NSE some time between 1230 3/28 and 1230 03/29  ---2nd NSE: Please collect NSE some time on 03/30  [] please connect to vEEG

## 2025-03-28 NOTE — PROGRESS NOTE ADULT - SUBJECTIVE AND OBJECTIVE BOX
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 now requires assistive devices and 1 person assist to ambulate, large coronary artery aneurysms () anterior and superior to LV with calcification/thrombus within wall compressing LA as well as proximal dilatation of RCA. TTE: normal biventricular function. LHC showed large saccular aneurysm of proximal LM with LAD and LCx coming off confluence and medium localized aneurysm of RCA. Per CT surgery, determined to not be a CABG or transplant candidate. 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     presenta from doctor's appointment after synopal event. When EMS arrived she had no pulse and CPR was initiated, ROSC after 20-25minutes. Unclear downtime prior to initiation of CPR but EMS arrived 20minutes after they were called. Total downtime could be up to 40-45minutes. She was defibrillated for Vfib. Stabilized at [facility] and brought to [facility]. She had a LHC on admission, no intervention performed.       transferred to CCU after LHC without intervention (unable      INTERVAL HISTORY/OVERNIGHT EVENTS:     TELEMETRY:     EKG:       ALLERGIES: Allergies    No Known Allergies    Intolerances        MEDICATIONS:  MEDICATIONS  (STANDING):  aMIOdarone Infusion 1 mG/Min (33.3 mL/Hr) IV Continuous <Continuous>  aMIOdarone Infusion 0.5 mG/Min (16.7 mL/Hr) IV Continuous <Continuous>  chlorhexidine 0.12% Liquid 15 milliLiter(s) Oral Mucosa every 12 hours  chlorhexidine 2% Cloths 1 Application(s) Topical <User Schedule>  heparin  Infusion.  Unit(s)/Hr (11 mL/Hr) IV Continuous <Continuous>  norepinephrine Infusion 0.05 MICROgram(s)/kG/Min (3.05 mL/Hr) IV Continuous <Continuous>  pantoprazole  Injectable 40 milliGRAM(s) IV Push daily  piperacillin/tazobactam IVPB.. 3.375 Gram(s) IV Intermittent every 12 hours  propofol Infusion 10 MICROgram(s)/kG/Min (3.9 mL/Hr) IV Continuous <Continuous>    MEDICATIONS  (PRN):  heparin   Injectable 5000 Unit(s) IV Push every 6 hours PRN For aPTT less than 40  heparin   Injectable 2500 Unit(s) IV Push every 6 hours PRN For aPTT between 40 - 57        OBJECTIVE:  ICU Vital Signs Last 24 Hrs  T(C): 37.7 (28 Mar 2025 04:00), Max: 37.7 (28 Mar 2025 04:00)  T(F): 99.9 (28 Mar 2025 04:00), Max: 99.9 (28 Mar 2025 04:00)  HR: 74 (28 Mar 2025 06:00) (69 - 92)  BP: 94/78 (27 Mar 2025 20:00) (89/76 - 94/78)  BP(mean): 85 (27 Mar 2025 20:) (82 - 85)  ABP: 92/65 (28 Mar 2025 06:) (86/64 - 118/86)  ABP(mean): 74 (28 Mar 2025 06:) (72 - 98)  RR: 18 (28 Mar 2025 06:) (12 - 18)  SpO2: 100% (28 Mar 2025 06:00) (98% - 100%)    O2 Parameters below as of 28 Mar 2025 06:00  Patient On (Oxygen Delivery Method): ventilator          Mode: AC/ CMV (Assist Control/ Continuous Mandatory Ventilation)  RR (machine): 18  TV (machine): 450  FiO2: 60  PEEP: 6  ITime: 0.84  MAP: 11  PIP: 26    I&O's Summary    27 Mar 2025 07:01  -  28 Mar 2025 06:40  --------------------------------------------------------  IN: 679.7 mL / OUT: 1300 mL / NET: -620.3 mL      Daily     Daily Weight in k.4 (28 Mar 2025 04:00)      PHYSICAL EXAMINATION:  General: Comfortable, no acute distress, cooperative with exam.  HEENT: Moist mucous membranes.  Respiratory: CTAB, normal respiratory effort, no coughing, wheezes, crackles, or rales.  CV: RRR, S1S2, no murmurs, rubs or gallops. No JVD. Distal pulses intact.  Abdominal: Soft, nontender, nondistended, no rebound or guarding, normal bowel sounds.  Neurology: AOx3, no focal neuro defects, DARDEN x 4.  Extremities: No pitting edema, + Peripheral pulses.          LABS:  ABG - ( 28 Mar 2025 04:31 )  pH, Arterial: 7.48  pH, Blood: x     /  pCO2: 26    /  pO2: 184   / HCO3: 19    / Base Excess: -3.0  /  SaO2: 98.7                                    10.8   20.18 )-----------( 174      ( 27 Mar 2025 21:52 )             35.1         136  |  100  |  32[H]  ----------------------------<  207[H]  3.8   |  15[L]  |  1.57[H]    Ca    8.0[L]      28 Mar 2025 04:31  Phos  1.9       Mg     2.30         TPro  6.2  /  Alb  3.5  /  TBili  0.4  /  DBili  x   /  AST  983[H]  /  ALT  784[H]  /  AlkPhos  66  28    LIVER FUNCTIONS - ( 28 Mar 2025 04:31 )  Alb: 3.5 g/dL / Pro: 6.2 g/dL / ALK PHOS: 66 U/L / ALT: 784 U/L / AST: 983 U/L / GGT: x           PT/INR - ( 27 Mar 2025 21:52 )   PT: 12.6 sec;   INR: 1.09 ratio         PTT - ( 27 Mar 2025 21:52 )  PTT:29.0 sec  CKMB Units: 162.5 ng/mL ( @ 21:52)    CARDIAC MARKERS ( 27 Mar 2025 21:52 )  x     / x     / x     / x     / 162.5 ng/mL      Urinalysis Basic - ( 28 Mar 2025 04:31 )    Color: x / Appearance: x / SG: x / pH: x  Gluc: 207 mg/dL / Ketone: x  / Bili: x / Urobili: x   Blood: x / Protein: x / Nitrite: x   Leuk Esterase: x / RBC: x / WBC x   Sq Epi: x / Non Sq Epi: x / Bacteria: x      Assessment:  · Assessment	  46 y/o F with PMH of HTN, 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 Since, she has required assistive devices to ambulate along with 1 person assist. In , she was diagnosed with large coronary artery aneurysms as part of a workup for chest tightness. Cardiac CT showed large aneurysmal dilatation located anterior and superior to LV with calcification/thrombus within wall and compressing LA as well as proximal dilatation of RCA. TTE showed normal biventricular function. LHC showed large saccular aneurysm of proximal LM with LAD and LCx coming off confluence and medium localized aneurysm of RCA. Per CT surgery, determined to not be a CABG or transplant candidate. 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     On 3/27 she was at a doctor's appointment and synopsized in the office. When EMS arrived she had no pulse and CPR was initiated, ROSC after 20-25minutes. Unclear downtime prior to initiation of CPR but EMS arrived 20minutes after they were called. Total downtime could be up to 40-45minutes. She was defibrillated for Vfib. Stabilized at [facility] and brought to [facility]. She had a LHC on admission, no intervention performed.     NEURO  #Sedated  -Propofol gtt, turn off  -Will need to assess baseline mental status with prolonged cardiac arrest and unknown downtime  -Baseline: alert and oriented x3, wheelchair bound  -EEG  -CT head    PULM  #Respiratory failure due to cardiac arrest  -AC 18/450/100/6  -Check CXR post intubation    CARDS  #Shock state  -Due to cardiac arrest  -Maintain levo gtt, titrate MAP>65  -Check TTE    #Coronary artery saccular aneurysm  -Unclear etiology, possible Kawasaki disease in childhood  -Start on heparin gtt, patient specific protocol  -Previously deemed to be not a transplant or surgical candidate in   -Was maintained on Eliquis and ASA    #Vfib arrest  -Maintain amiodarone gtt    GI  #OGT in place  -NPO        ENDO  -No active issues    ID  -No active issues    HEME  #History of coronary thrombus   -Start heparin gtt  -Outpt: Eliquis/ASA    Ethics: Full code   CCU Service  Cardiology   Spect: [id number] ([facility])     PATIENT: LIZZIE ESPINOZA, MRN: [id number]    44 y/o F with PMH of HTN, 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 now requires assistive devices and 1 person assist to ambulate, large coronary artery aneurysms () anterior and superior to LV with calcification/thrombus within wall compressing LA as well as proximal dilatation of RCA. TTE: normal biventricular function. LHC showed large saccular aneurysm of proximal LM with LAD and LCx coming off confluence and medium localized aneurysm of RCA. Per CT surgery, determined to not be a CABG or transplant candidate. 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     presenta from doctor's appointment after synopal event. When EMS arrived she had no pulse and CPR was initiated, ROSC after 20-25minutes. Unclear downtime prior to initiation of CPR but EMS arrived 20minutes after they were called. Total downtime could be up to 40-45minutes. She was defibrillated for Vfib. Stabilized at [facility] and brought to [facility]. She had a LHC on admission, no intervention performed.       transferred to CCU after LHC without intervention (unable      INTERVAL HISTORY/OVERNIGHT EVENTS: coughing overnight when turned and position pupils pinpoint, fixed  450/18/60/6  Heparin  amio 0.5mg gtt untill 6pm  prop 50mcg    TELEMETRY: SR one episode NSVT 4 beats    EKG: SR, borferline repol, std V3,4,5      ALLERGIES: Allergies    No Known Allergies    Intolerances        MEDICATIONS:  MEDICATIONS  (STANDING):  aMIOdarone Infusion 1 mG/Min (33.3 mL/Hr) IV Continuous <Continuous>  aMIOdarone Infusion 0.5 mG/Min (16.7 mL/Hr) IV Continuous <Continuous>  chlorhexidine 0.12% Liquid 15 milliLiter(s) Oral Mucosa every 12 hours  chlorhexidine 2% Cloths 1 Application(s) Topical <User Schedule>  heparin  Infusion.  Unit(s)/Hr (11 mL/Hr) IV Continuous <Continuous>  norepinephrine Infusion 0.05 MICROgram(s)/kG/Min (3.05 mL/Hr) IV Continuous <Continuous>  pantoprazole  Injectable 40 milliGRAM(s) IV Push daily  piperacillin/tazobactam IVPB.. 3.375 Gram(s) IV Intermittent every 12 hours  propofol Infusion 10 MICROgram(s)/kG/Min (3.9 mL/Hr) IV Continuous <Continuous>    MEDICATIONS  (PRN):  heparin   Injectable 5000 Unit(s) IV Push every 6 hours PRN For aPTT less than 40  heparin   Injectable 2500 Unit(s) IV Push every 6 hours PRN For aPTT between 40 - 57        OBJECTIVE:  ICU Vital Signs Last 24 Hrs  T(C): 37.7 (28 Mar 2025 04:00), Max: 37.7 (28 Mar 2025 04:00)  T(F): 99.9 (28 Mar 2025 04:00), Max: 99.9 (28 Mar 2025 04:00)  HR: 74 (28 Mar 2025 06:00) (69 - 92)  BP: 94/78 (27 Mar 2025 20:00) (89/76 - 94/78)  BP(mean): 85 (27 Mar 2025 20:00) (82 - 85)  ABP: 92/65 (28 Mar 2025 06:00) (86/64 - 118/86)  ABP(mean): 74 (28 Mar 2025 06:00) (72 - 98)  RR: 18 (28 Mar 2025 06:00) (12 - 18)  SpO2: 100% (28 Mar 2025 06:00) (98% - 100%)    O2 Parameters below as of 28 Mar 2025 06:00  Patient On (Oxygen Delivery Method): ventilator          Mode: AC/ CMV (Assist Control/ Continuous Mandatory Ventilation)  RR (machine): 18  TV (machine): 450  FiO2: 60  PEEP: 6  ITime: 0.84  MAP: 11  PIP: 26    I&O's Summary    27 Mar 2025 07:01  -  28 Mar 2025 06:40  --------------------------------------------------------  IN: 679.7 mL / OUT: 1300 mL / NET: -620.3 mL      Daily     Daily Weight in k.4 (28 Mar 2025 04:00)      PHYSICAL EXAMINATION:  General: Comfortable, no acute distress, cooperative with exam.  HEENT: Moist mucous membranes.  Respiratory: CTAB, normal respiratory effort, no coughing, wheezes, crackles, or rales.  CV: RRR, S1S2, no murmurs, rubs or gallops. No JVD. Distal pulses intact.  Abdominal: Soft, nontender, nondistended, no rebound or guarding, normal bowel sounds.  Neurology: AOx3, no focal neuro defects, DARDEN x 4.  Extremities: No pitting edema, + Peripheral pulses.          LABS:  ABG - ( 28 Mar 2025 04:31 )  pH, Arterial: 7.48  pH, Blood: x     /  pCO2: 26    /  pO2: 184   / HCO3: 19    / Base Excess: -3.0  /  SaO2: 98.7                                    10.8   20.18 )-----------( 174      ( 27 Mar 2025 21:52 )             35.1     03-28    136  |  100  |  32[H]  ----------------------------<  207[H]  3.8   |  15[L]  |  1.57[H]    Ca    8.0[L]      28 Mar 2025 04:31  Phos  1.9     -  Mg     2.30     -28    TPro  6.2  /  Alb  3.5  /  TBili  0.4  /  DBili  x   /  AST  983[H]  /  ALT  784[H]  /  AlkPhos  66  03-28    LIVER FUNCTIONS - ( 28 Mar 2025 04:31 )  Alb: 3.5 g/dL / Pro: 6.2 g/dL / ALK PHOS: 66 U/L / ALT: 784 U/L / AST: 983 U/L / GGT: x           PT/INR - ( 27 Mar 2025 21:52 )   PT: 12.6 sec;   INR: 1.09 ratio         PTT - ( 27 Mar 2025 21:52 )  PTT:29.0 sec  CKMB Units: 162.5 ng/mL ( @ 21:52)    CARDIAC MARKERS ( 27 Mar 2025 21:52 )  x     / x     / x     / x     / 162.5 ng/mL      Urinalysis Basic - ( 28 Mar 2025 04:31 )    Color: x / Appearance: x / SG: x / pH: x  Gluc: 207 mg/dL / Ketone: x  / Bili: x / Urobili: x   Blood: x / Protein: x / Nitrite: x   Leuk Esterase: x / RBC: x / WBC x   Sq Epi: x / Non Sq Epi: x / Bacteria: x      Assessment:  · Assessment	  44 y/o F with PMH of HTN, 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 Since, she has required assistive devices to ambulate along with 1 person assist. In , she was diagnosed with large coronary artery aneurysms as part of a workup for chest tightness. Cardiac CT showed large aneurysmal dilatation located anterior and superior to LV with calcification/thrombus within wall and compressing LA as well as proximal dilatation of RCA. TTE showed normal biventricular function. LHC showed large saccular aneurysm of proximal LM with LAD and LCx coming off confluence and medium localized aneurysm of RCA. Per CT surgery, determined to not be a CABG or transplant candidate. 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     On 3/27 she was at a doctor's appointment and synopsized in the office. When EMS arrived she had no pulse and CPR was initiated, ROSC after 20-25minutes. Unclear downtime prior to initiation of CPR but EMS arrived 20minutes after they were called. Total downtime could be up to 40-45minutes. She was defibrillated for Vfib. Stabilized at [facility] and brought to [facility]. She had a LHC on admission, no intervention performed.     NEURO  #Sedated  -Propofol gtt, turn off  -Will need to assess baseline mental status with prolonged cardiac arrest and unknown downtime  -Baseline: alert and oriented x3, wheelchair bound  -EEG  -CT head    PULM  #Respiratory failure due to cardiac arrest  -AC 18/450/100/6  -Check CXR post intubation    CARDS  #Shock state  -Due to cardiac arrest  -Maintain levo gtt, titrate MAP>65  -Check TTE    #Coronary artery saccular aneurysm  -Unclear etiology, possible Kawasaki disease in childhood  -Start on heparin gtt, patient specific protocol  -Previously deemed to be not a transplant or surgical candidate in   -Was maintained on Eliquis and ASA    #Vfib arrest  -Maintain amiodarone gtt    GI  #OGT in place  -NPO        ENDO  -No active issues    ID  -No active issues    HEME  #History of coronary thrombus   -Start heparin gtt  -Outpt: Eliquis/ASA    Ethics: Full code   CCU Service  Cardiology   Spect: [id number] ([facility])     PATIENT: LIZZIE ESPINOZA, MRN: [id number]    44 y/o F with PMH of HTN, 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 now requires assistive devices and 1 person assist to ambulate, large coronary artery aneurysms () anterior and superior to LV with calcification/thrombus within wall compressing LA as well as proximal dilatation of RCA. TTE: normal biventricular function. LHC showed large saccular aneurysm of proximal LM with LAD and LCx coming off confluence and medium localized aneurysm of RCA. Per CT surgery, determined to not be a CABG or transplant candidate. 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     presenta from doctor's appointment after synopal event. When EMS arrived she had no pulse and CPR was initiated, ROSC after 20-25minutes. Unclear downtime prior to initiation of CPR but EMS arrived 20minutes after they were called. Total downtime could be up to 40-45minutes. She was defibrillated for Vfib. Stabilized at [facility] and brought to [facility]. She had a LHC on admission, no intervention performed.       transferred to CCU after LHC without intervention (unable      INTERVAL HISTORY/OVERNIGHT EVENTS: coughing overnight when turned and position pupils pinpoint, fixed  450/18/60/6  Heparin  amio 0.5mg gtt untill 6pm  prop 50mcg    TELEMETRY: SR one episode NSVT 4 beats    EKG: SR, borferline repol, std V3,4,5      ALLERGIES: Allergies    No Known Allergies    Intolerances        MEDICATIONS:  MEDICATIONS  (STANDING):  aMIOdarone Infusion 1 mG/Min (33.3 mL/Hr) IV Continuous <Continuous>  aMIOdarone Infusion 0.5 mG/Min (16.7 mL/Hr) IV Continuous <Continuous>  chlorhexidine 0.12% Liquid 15 milliLiter(s) Oral Mucosa every 12 hours  chlorhexidine 2% Cloths 1 Application(s) Topical <User Schedule>  heparin  Infusion.  Unit(s)/Hr (11 mL/Hr) IV Continuous <Continuous>  norepinephrine Infusion 0.05 MICROgram(s)/kG/Min (3.05 mL/Hr) IV Continuous <Continuous>  pantoprazole  Injectable 40 milliGRAM(s) IV Push daily  piperacillin/tazobactam IVPB.. 3.375 Gram(s) IV Intermittent every 12 hours  propofol Infusion 10 MICROgram(s)/kG/Min (3.9 mL/Hr) IV Continuous <Continuous>    MEDICATIONS  (PRN):  heparin   Injectable 5000 Unit(s) IV Push every 6 hours PRN For aPTT less than 40  heparin   Injectable 2500 Unit(s) IV Push every 6 hours PRN For aPTT between 40 - 57        OBJECTIVE:  ICU Vital Signs Last 24 Hrs  T(C): 37.7 (28 Mar 2025 04:00), Max: 37.7 (28 Mar 2025 04:00)  T(F): 99.9 (28 Mar 2025 04:00), Max: 99.9 (28 Mar 2025 04:00)  HR: 74 (28 Mar 2025 06:00) (69 - 92)  BP: 94/78 (27 Mar 2025 20:00) (89/76 - 94/78)  BP(mean): 85 (27 Mar 2025 20:00) (82 - 85)  ABP: 92/65 (28 Mar 2025 06:00) (86/64 - 118/86)  ABP(mean): 74 (28 Mar 2025 06:00) (72 - 98)  RR: 18 (28 Mar 2025 06:00) (12 - 18)  SpO2: 100% (28 Mar 2025 06:00) (98% - 100%)    O2 Parameters below as of 28 Mar 2025 06:00  Patient On (Oxygen Delivery Method): ventilator          Mode: AC/ CMV (Assist Control/ Continuous Mandatory Ventilation)  RR (machine): 18  TV (machine): 450  FiO2: 60  PEEP: 6  ITime: 0.84  MAP: 11  PIP: 26    I&O's Summary    27 Mar 2025 07:01  -  28 Mar 2025 06:40  --------------------------------------------------------  IN: 679.7 mL / OUT: 1300 mL / NET: -620.3 mL      Daily     Daily Weight in k.4 (28 Mar 2025 04:00)      PHYSICAL EXAMINATION:  General: Comfortable, no acute distress, cooperative with exam.  HEENT: Moist mucous membranes.  Respiratory: CTAB, normal respiratory effort, no coughing, wheezes, crackles, or rales.  CV: RRR, S1S2, no murmurs, rubs or gallops. No JVD. Distal pulses intact.  Abdominal: Soft, nontender, nondistended, no rebound or guarding, normal bowel sounds.  Neurology: AOx3, no focal neuro defects, DARDEN x 4.  Extremities: No pitting edema, + Peripheral pulses.          LABS:  ABG - ( 28 Mar 2025 04:31 )  pH, Arterial: 7.48  pH, Blood: x     /  pCO2: 26    /  pO2: 184   / HCO3: 19    / Base Excess: -3.0  /  SaO2: 98.7                                    10.8   20.18 )-----------( 174      ( 27 Mar 2025 21:52 )             35.1     03-28    136  |  100  |  32[H]  ----------------------------<  207[H]  3.8   |  15[L]  |  1.57[H]    Ca    8.0[L]      28 Mar 2025 04:31  Phos  1.9     -  Mg     2.30     -28    TPro  6.2  /  Alb  3.5  /  TBili  0.4  /  DBili  x   /  AST  983[H]  /  ALT  784[H]  /  AlkPhos  66  03-28    LIVER FUNCTIONS - ( 28 Mar 2025 04:31 )  Alb: 3.5 g/dL / Pro: 6.2 g/dL / ALK PHOS: 66 U/L / ALT: 784 U/L / AST: 983 U/L / GGT: x           PT/INR - ( 27 Mar 2025 21:52 )   PT: 12.6 sec;   INR: 1.09 ratio         PTT - ( 27 Mar 2025 21:52 )  PTT:29.0 sec  CKMB Units: 162.5 ng/mL ( @ 21:52)    CARDIAC MARKERS ( 27 Mar 2025 21:52 )  x     / x     / x     / x     / 162.5 ng/mL      Urinalysis Basic - ( 28 Mar 2025 04:31 )    Color: x / Appearance: x / SG: x / pH: x  Gluc: 207 mg/dL / Ketone: x  / Bili: x / Urobili: x   Blood: x / Protein: x / Nitrite: x   Leuk Esterase: x / RBC: x / WBC x   Sq Epi: x / Non Sq Epi: x / Bacteria: x      Assessment:  · Assessment	  44 y/o F with PMH of HTN, 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 Since, she has required assistive devices to ambulate along with 1 person assist. In , she was diagnosed with large coronary artery aneurysms as part of a workup for chest tightness. Cardiac CT showed large aneurysmal dilatation located anterior and superior to LV with calcification/thrombus within wall and compressing LA as well as proximal dilatation of RCA. TTE showed normal biventricular function. LHC showed large saccular aneurysm of proximal LM with LAD and LCx coming off confluence and medium localized aneurysm of RCA. Per CT surgery, determined to not be a CABG or transplant candidate. 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     On 3/27 she was at a doctor's appointment and synopsized in the office. When EMS arrived she had no pulse and CPR was initiated, ROSC after 20-25minutes. Unclear downtime prior to initiation of CPR but EMS arrived 20minutes after they were called. Total downtime could be up to 40-45minutes. She was defibrillated for Vfib. Stabilized at [facility] and brought to [facility]. She had a LHC on admission, no intervention performed.     NEURO  -Baseline: alert and oriented x3, wheelchair bound    #diffuse cerebral anoxic injury off sedation  -Propofol gtt, turn off 3/28 10 AM  Neuro recs appreciated  - repeat CTH 3/30 wo contrast for acute change in neurologic status  - neuron specific enolase 24-48 hours and 72 hours post arrest AM labs: 3/29, 3/31  - EEG  - Delium w/u: Vit. 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  -Start on heparin gtt, patient specific protocol  -Previously deemed to be not a transplant or surgical candidate in   -Was maintained on Eliquis and ASA    #Vfib arrest  -Maintain amiodarone gtt    GI  #OGT in place  -NPO        ENDO  -No active issues    ID  -No active issues    HEME  #History of coronary thrombus   -Start heparin gtt  -Outpt: Eliquis/ASA    Ethics: Full code   CCU Service  Cardiology   Spect: [id number] ([facility])     PATIENT: LIZZIE ESPINOZA, MRN: [id number]    44 y/o wheelchair bound female with PMHx of HTN, CVAx2 () s/ptrach and PEG with residual right sided weakness, large coronary artery aneurysms () anterior and superior to LV with calcification/thrombus within wall compressing LA as well as proximal dilatation of RCA.  TTE: normal biventricular function, [facility] . showed large saccular aneurysm of proximal LM with LAD and LCx coming off confluence and medium localized aneurysm of RCA. Per CT surgery, notCABG or transplant candidate. 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   presents to [facility] from routine visit to neurologist (Dr Ortiz) after syncopal event. When EMS arrived she had no pulse and CPR was initiated, ROSC after 20-25minutes. Unclear downtime prior to initiation of CPR but EMS arrived 20minutes after they were called. Total downtime could be up to 40-45minutes. She was defibrillated for Vfib. Stabilized at [facility] and brought to [facility]. She had a LHC on admission, no intervention performed.       transferred to CCU after LHC without intervention (no intervention)      INTERVAL HISTORY/OVERNIGHT EVENTS: coughing overnight when turned and position pupils pinpoint, fixed  450/16/60/6  Heparin  amio 0.5mg gtt untill 6pm  prop 50mcg    TELEMETRY: SR one episode NSVT 4 beats    EKG: SR, borderline repol, std V3,4,5      ALLERGIES: Allergies    No Known Allergies    Intolerances        MEDICATIONS:  MEDICATIONS  (STANDING):  aMIOdarone Infusion 1 mG/Min (33.3 mL/Hr) IV Continuous <Continuous>  aMIOdarone Infusion 0.5 mG/Min (16.7 mL/Hr) IV Continuous <Continuous>  chlorhexidine 0.12% Liquid 15 milliLiter(s) Oral Mucosa every 12 hours  chlorhexidine 2% Cloths 1 Application(s) Topical <User Schedule>  heparin  Infusion.  Unit(s)/Hr (11 mL/Hr) IV Continuous <Continuous>  norepinephrine Infusion 0.05 MICROgram(s)/kG/Min (3.05 mL/Hr) IV Continuous <Continuous>  pantoprazole  Injectable 40 milliGRAM(s) IV Push daily  piperacillin/tazobactam IVPB.. 3.375 Gram(s) IV Intermittent every 12 hours  propofol Infusion 10 MICROgram(s)/kG/Min (3.9 mL/Hr) IV Continuous <Continuous>    MEDICATIONS  (PRN):  heparin   Injectable 5000 Unit(s) IV Push every 6 hours PRN For aPTT less than 40  heparin   Injectable 2500 Unit(s) IV Push every 6 hours PRN For aPTT between 40 - 57        OBJECTIVE:  ICU Vital Signs Last 24 Hrs  T(C): 37.7 (28 Mar 2025 04:00), Max: 37.7 (28 Mar 2025 04:00)  T(F): 99.9 (28 Mar 2025 04:00), Max: 99.9 (28 Mar 2025 04:00)  HR: 74 (28 Mar 2025 06:00) (69 - 92)  BP: 94/78 (27 Mar 2025 20:00) (89/76 - 94/78)  BP(mean): 85 (27 Mar 2025 20:00) (82 - 85)  ABP: 92/65 (28 Mar 2025 06:00) (86/64 - 118/86)  ABP(mean): 74 (28 Mar 2025 06:00) (72 - 98)  RR: 18 (28 Mar 2025 06:00) (12 - 18)  SpO2: 100% (28 Mar 2025 06:00) (98% - 100%)    O2 Parameters below as of 28 Mar 2025 06:00  Patient On (Oxygen Delivery Method): ventilator          Mode: AC/ CMV (Assist Control/ Continuous Mandatory Ventilation)  RR (machine): 18  TV (machine): 450  FiO2: 60  PEEP: 6  ITime: 0.84  MAP: 11  PIP: 26    I&O's Summary    27 Mar 2025 07:01  -  28 Mar 2025 06:40  --------------------------------------------------------  IN: 679.7 mL / OUT: 1300 mL / NET: -620.3 mL      Daily     Daily Weight in k.4 (28 Mar 2025 04:00)      PHYSICAL EXAMINATION:  General: Comfortable, no acute distress, cooperative with exam.  HEENT: Moist mucous membranes.  Respiratory: CTAB, normal respiratory effort, no coughing, wheezes, crackles, or rales.  CV: RRR, S1S2, no murmurs, rubs or gallops. No JVD. Distal pulses intact.  Abdominal: Soft, nontender, nondistended, no rebound or guarding, normal bowel sounds.  Neurology: AOx3, no focal neuro defects, DARDEN x 4.  Extremities: No pitting edema, + Peripheral pulses.          LABS:  ABG - ( 28 Mar 2025 04:31 )  pH, Arterial: 7.48  pH, Blood: x     /  pCO2: 26    /  pO2: 184   / HCO3: 19    / Base Excess: -3.0  /  SaO2: 98.7                                    10.8   20.18 )-----------( 174      ( 27 Mar 2025 21:52 )             35.1         136  |  100  |  32[H]  ----------------------------<  207[H]  3.8   |  15[L]  |  1.57[H]    Ca    8.0[L]      28 Mar 2025 04:31  Phos  1.9       Mg     2.30         TPro  6.2  /  Alb  3.5  /  TBili  0.4  /  DBili  x   /  AST  983[H]  /  ALT  784[H]  /  AlkPhos  66  28    LIVER FUNCTIONS - ( 28 Mar 2025 04:31 )  Alb: 3.5 g/dL / Pro: 6.2 g/dL / ALK PHOS: 66 U/L / ALT: 784 U/L / AST: 983 U/L / GGT: x           PT/INR - ( 27 Mar 2025 21:52 )   PT: 12.6 sec;   INR: 1.09 ratio         PTT - ( 27 Mar 2025 21:52 )  PTT:29.0 sec  CKMB Units: 162.5 ng/mL ( @ 21:52)    CARDIAC MARKERS ( 27 Mar 2025 21:52 )  x     / x     / x     / x     / 162.5 ng/mL      Urinalysis Basic - ( 28 Mar 2025 04:31 )    Color: x / Appearance: x / SG: x / pH: x  Gluc: 207 mg/dL / Ketone: x  / Bili: x / Urobili: x   Blood: x / Protein: x / Nitrite: x   Leuk Esterase: x / RBC: x / WBC x   Sq Epi: x / Non Sq Epi: x / Bacteria: x      Assessment:  · Assessment	  44 y/o F with PMH of HTN, 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 Since, she has required assistive devices to ambulate along with 1 person assist. In , she was diagnosed with large coronary artery aneurysms as part of a workup for chest tightness. Cardiac CT showed large aneurysmal dilatation located anterior and superior to LV with calcification/thrombus within wall and compressing LA as well as proximal dilatation of RCA. TTE showed normal biventricular function. LHC showed large saccular aneurysm of proximal LM with LAD and LCx coming off confluence and medium localized aneurysm of RCA. Per CT surgery, determined to not be a CABG or transplant candidate. 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     On 3/27 she was at a doctor's appointment and synopsized in the office. When EMS arrived she had no pulse and CPR was initiated, ROSC after 20-25minutes. Unclear downtime prior to initiation of CPR but EMS arrived 20minutes after they were called. Total downtime could be up to 40-45minutes. She was defibrillated for Vfib. Stabilized at [facility] and brought to [facility]. She had a LHC on admission, no intervention performed.     NEURO  -Baseline: alert and oriented x3, wheelchair bound  - < from: CT Head No Cont (25 @ 09:18) >  IMPRESSION: No evidence of an acute transcortical infarction or   hemorrhage.    #diffuse cerebral anoxic injury v seizures   -Propofol gtt, turn off 3/28 10 AM  - Neuro recs appreciated  - repeat CTH 3/30 wo contrast for acute change in neurologic status  - neuron specific enolase 24-48 hours and 72 hours post arrest AM labs: 3/29, 3/31  - Video EEG  - Delium w/u: Vit. b 12, folate, B6, thiamine, ETOH level, troponin level, LFT, TSH, Urine toxicology, ammonia  - HoId MRI brain wo contrast 5-7 days post arrest (25 - 25)  - Hold narcotics/sedatives as able  - general Neuro check q4h    PULM  #Respiratory failure due to cardiac arrest  -AC 16/450/100/6  -Check CXR post intubation    CARDS  #Syncope and collapse  pbnp 1268  Echo   0530 HORACE CO 7.83 CI 4.55 CVP 5  MVO2 80%  Dc'd swan      #Vfib arrest  - Last [facility] : mild CAD, aneurysm LM, LAD, CX, RCA  -Maintain amiodarone gtt   - trops 2500, ckmb 160 CKMB index 4  [facility] Diagnostic Conclusions:   Excessively ectatic left main coronary artery resulting in difficult  to opacify LAD and Cx coronaries. RCA with proximal segment  ectasia and slow coronary flow. Severe LV systolic dysfunction with at  least moderate-severe mitral regurgitation.  RA 10 mmHg.PA 31/19/24 mmHg. PCW 16 mmHg. Cardiac index 2.57  L/min/m2. SVR 1141 dsc.  - c/w  heparin gtt, blood pressure control  -  Appreciate CTS Dr Amando Aviles  - iso ischemia, coronary artery aneurysm surgery is indicated, appreciate the concerns regarding the patient's functional state.          GI  #OGT in place  - Start tube feeds  - nutrition consult      TISH   - creatinine 1.14 in   - now elevated 1.57 iso cardiac arrest    ENDO  - HgA1c 5.3  - TSH 0.73  -No active issues    ID  hyperthermic, leukocytosis  Empiric Zosyn started        HEME  #History of coronary thrombus   -Start heparin gtt  -Outpt: Eliquis/ASA    Ethics: Full code   CCU Service  Cardiology   Spect: [id number] ([facility])     PATIENT: LIZZIE ESPINOZA, MRN: [id number]    44 y/o wheelchair bound female with PMHx of HTN, CVAx2 () s/p trach and PEG (reversed)c with residual right sided weakness, large coronary artery aneurysms () anterior and superior to LV with calcification/thrombus within wall compressing LA as well as proximal dilatation of RCA.  TTE: normal biventricular function, [facility] . showed large saccular aneurysm of proximal LM with LAD and LCx coming off confluence and medium localized aneurysm of RCA. Per CT surgery, notCABG or transplant candidate. 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   presents to [facility] from routine visit to neurologist (Dr Ortiz) after syncopal event. When EMS arrived she had no pulse and CPR was initiated, ROSC after 20-25minutes. Unclear downtime prior to initiation of CPR but EMS arrived 20minutes after they were called. Total downtime could be up to 40-45minutes. She was defibrillated for Vfib. Stabilized at [facility] and brought to [facility]. She had a LHC on admission, no intervention performed.       transferred to CCU after C without intervention (no intervention). With swan-cande 0530 HORACE CO 7.83 CI 4.55 CVP 5  MVO2 80%      INTERVAL HISTORY/OVERNIGHT EVENTS: coughing overnight when turned and position pupils pinpoint, fixed  450/16/60/6  Heparin  amio 0.5mg gtt untill 6pm  prop 50mcg    TELEMETRY: SR one episode NSVT 4 beats    EKG: SR, borderline repol, std V3,4,5      ALLERGIES: Allergies    No Known Allergies    Intolerances        MEDICATIONS:  MEDICATIONS  (STANDING):  aMIOdarone Infusion 1 mG/Min (33.3 mL/Hr) IV Continuous <Continuous>  aMIOdarone Infusion 0.5 mG/Min (16.7 mL/Hr) IV Continuous <Continuous>  chlorhexidine 0.12% Liquid 15 milliLiter(s) Oral Mucosa every 12 hours  chlorhexidine 2% Cloths 1 Application(s) Topical <User Schedule>  heparin  Infusion.  Unit(s)/Hr (11 mL/Hr) IV Continuous <Continuous>  norepinephrine Infusion 0.05 MICROgram(s)/kG/Min (3.05 mL/Hr) IV Continuous <Continuous>  pantoprazole  Injectable 40 milliGRAM(s) IV Push daily  piperacillin/tazobactam IVPB.. 3.375 Gram(s) IV Intermittent every 12 hours  propofol Infusion 10 MICROgram(s)/kG/Min (3.9 mL/Hr) IV Continuous <Continuous>    MEDICATIONS  (PRN):  heparin   Injectable 5000 Unit(s) IV Push every 6 hours PRN For aPTT less than 40  heparin   Injectable 2500 Unit(s) IV Push every 6 hours PRN For aPTT between 40 - 57        OBJECTIVE:  ICU Vital Signs Last 24 Hrs  T(C): 37.7 (28 Mar 2025 04:00), Max: 37.7 (28 Mar 2025 04:00)  T(F): 99.9 (28 Mar 2025 04:00), Max: 99.9 (28 Mar 2025 04:00)  HR: 74 (28 Mar 2025 06:00) (69 - 92)  BP: 94/78 (27 Mar 2025 20:00) (89/76 - 94/78)  BP(mean): 85 (27 Mar 2025 20:00) (82 - 85)  ABP: 92/65 (28 Mar 2025 06:00) (86/64 - 118/86)  ABP(mean): 74 (28 Mar 2025 06:00) (72 - 98)  RR: 18 (28 Mar 2025 06:00) (12 - 18)  SpO2: 100% (28 Mar 2025 06:00) (98% - 100%)    O2 Parameters below as of 28 Mar 2025 06:00  Patient On (Oxygen Delivery Method): ventilator          Mode: AC/ CMV (Assist Control/ Continuous Mandatory Ventilation)  RR (machine): 18  TV (machine): 450  FiO2: 60  PEEP: 6  ITime: 0.84  MAP: 11  PIP: 26    I&O's Summary    27 Mar 2025 07:01  -  28 Mar 2025 06:40  --------------------------------------------------------  IN: 679.7 mL / OUT: 1300 mL / NET: -620.3 mL      Daily     Daily Weight in k.4 (28 Mar 2025 04:00)      PHYSICAL EXAMINATION:  General: Comfortable, no acute distress, cooperative with exam.  HEENT: Moist mucous membranes.  Respiratory: CTAB, normal respiratory effort, no coughing, wheezes, crackles, or rales.  CV: RRR, S1S2, no murmurs, rubs or gallops. No JVD. Distal pulses intact.  Abdominal: Soft, nontender, nondistended, no rebound or guarding, normal bowel sounds.  Neurology: AOx3, no focal neuro defects, DARDEN x 4.  Extremities: No pitting edema, + Peripheral pulses.          LABS:  ABG - ( 28 Mar 2025 04:31 )  pH, Arterial: 7.48  pH, Blood: x     /  pCO2: 26    /  pO2: 184   / HCO3: 19    / Base Excess: -3.0  /  SaO2: 98.7                                    10.8   20.18 )-----------( 174      ( 27 Mar 2025 21:52 )             35.1         136  |  100  |  32[H]  ----------------------------<  207[H]  3.8   |  15[L]  |  1.57[H]    Ca    8.0[L]      28 Mar 2025 04:31  Phos  1.9       Mg     2.30         TPro  6.2  /  Alb  3.5  /  TBili  0.4  /  DBili  x   /  AST  983[H]  /  ALT  784[H]  /  AlkPhos  66  28    LIVER FUNCTIONS - ( 28 Mar 2025 04:31 )  Alb: 3.5 g/dL / Pro: 6.2 g/dL / ALK PHOS: 66 U/L / ALT: 784 U/L / AST: 983 U/L / GGT: x           PT/INR - ( 27 Mar 2025 21:52 )   PT: 12.6 sec;   INR: 1.09 ratio         PTT - ( 27 Mar 2025 21:52 )  PTT:29.0 sec  CKMB Units: 162.5 ng/mL ( @ 21:52)    CARDIAC MARKERS ( 27 Mar 2025 21:52 )  x     / x     / x     / x     / 162.5 ng/mL      Urinalysis Basic - ( 28 Mar 2025 04:31 )    Color: x / Appearance: x / SG: x / pH: x  Gluc: 207 mg/dL / Ketone: x  / Bili: x / Urobili: x   Blood: x / Protein: x / Nitrite: x   Leuk Esterase: x / RBC: x / WBC x   Sq Epi: x / Non Sq Epi: x / Bacteria: x      Assessment:  · Assessment	  44 y/o F with PMH of HTN, 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 Since, she has required assistive devices to ambulate along with 1 person assist. In , she was diagnosed with large coronary artery aneurysms as part of a workup for chest tightness. Cardiac CT showed large aneurysmal dilatation located anterior and superior to LV with calcification/thrombus within wall and compressing LA as well as proximal dilatation of RCA. TTE showed normal biventricular function. LHC showed large saccular aneurysm of proximal LM with LAD and LCx coming off confluence and medium localized aneurysm of RCA. Per CT surgery, determined to not be a CABG or transplant candidate. 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     On 3/27 she was at a doctor's appointment and synopsized in the office. When EMS arrived she had no pulse and CPR was initiated, ROSC after 20-25minutes. Unclear downtime prior to initiation of CPR but EMS arrived 20minutes after they were called. Total downtime could be up to 40-45minutes. She was defibrillated for Vfib. Stabilized at [facility] and brought to [facility]. She had a LHC on admission, no intervention performed.     NEURO  -Baseline: alert and oriented x3, wheelchair bound  - < from: CT Head No Cont (25 @ 09:18) >  IMPRESSION: No evidence of an acute transcortical infarction or   hemorrhage.    #diffuse cerebral anoxic injury v seizures   -Propofol gtt, turn off 3/28 10 AM  - Neuro recs appreciated  - repeat CTH 3/30 wo contrast for acute change in neurologic status  - neuron specific enolase 24-48 hours and 72 hours post arrest AM labs: 3/29, 3/31  - Video EEG  - Delium w/u: Vit. b 12, folate, B6, thiamine, ETOH level, troponin level, LFT, TSH, Urine toxicology, ammonia  - HoId MRI brain wo contrast 5-7 days post arrest (25 - 25)  - Hold narcotics/sedatives as able  - general Neuro check q4h    PULM  #Respiratory failure due to cardiac arrest  -AC 16/450/100/6  -Check CXR post intubation    CARDS  #Syncope and collapse  pbnp 1268  Echo   0530 HORACE CO 7.83 CI 4.55 CVP 5  MVO2 80%  Dc'd swan      #Vfib arrest  - Last [facility] : mild CAD, aneurysm LM, LAD, CX, RCA  -Maintain amiodarone gtt   - trops 2500, ckmb 160 CKMB index 4  [facility] Diagnostic Conclusions:   Excessively ectatic left main coronary artery resulting in difficult  to opacify LAD and Cx coronaries. RCA with proximal segment  ectasia and slow coronary flow. Severe LV systolic dysfunction with at  least moderate-severe mitral regurgitation.  RA 10 mmHg.PA 31/19/24 mmHg. PCW 16 mmHg. Cardiac index 2.57  L/min/m2. SVR 1141 dsc.  - c/w  heparin gtt, blood pressure control  -  Appreciate CTS Dr Amando Aviles  - iso ischemia, coronary artery aneurysm surgery is indicated, appreciate the concerns regarding the patient's functional state.          GI  #OGT in place  - Start tube feeds  - nutrition consult      TISH   - creatinine 1.14 in   - now elevated 1.57 iso cardiac arrest    ENDO  - HgA1c 5.3  - TSH 0.73  -No active issues    ID  hyperthermic, leukocytosis  Empiric Zosyn started        HEME  #History of coronary thrombus   -Start heparin gtt  -Outpt: Eliquis/ASA    Ethics: Full code

## 2025-03-28 NOTE — CHART NOTE - NSCHARTNOTEFT_GEN_A_CORE
EEG preliminary read (not final) on the initial recording hour(s) = x9PM    Substantial artifacts:  Mainly right ?facial clonic myogenic/movement artifacts near 1-1.5hz, (face not visible on video).  with intermittently more prominent superimposed rapid eye motion artifacts.      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  Clinical correlation required.     Final report to follow tomorrow morning after completion of study.    [facility] EEG Reading Room Ph#: [phone]  Epilepsy Answering Service after 5PM and before 8:30AM: Ph#: [phone]

## 2025-03-28 NOTE — PROCEDURE NOTE - NSINDICATIONS_GEN_A_CORE
arterial puncture to obtain ABG's/blood sampling/cannulation purposes/critical patient/monitoring purposes/transfusion

## 2025-03-28 NOTE — CHART NOTE - NSCHARTNOTEFT_GEN_A_CORE
EEG showing seizure like activity.  Recommendation:  -Give Ativan 4mg IV stat  -Load with 200mg of Vimpat now, then increase the standing dose to 300mg bid    Plan discussed with primary team over the phone.    Discussed with neurology attending Dr. Colón. EEG showing prolonged seizure activity.  Recommendation:  -Give Ativan 4mg IV stat  -Load with 200mg of Vimpat now, then increase the standing dose to 300mg bid    Please call neurology after administration of the above is complete to allow night time resident chance to spot check EEG with epilepsy attending/fellow.    Plan discussed with primary team over the phone.    Discussed with neurology attending Dr. Colón.

## 2025-03-28 NOTE — CHART NOTE - NSCHARTNOTEFT_GEN_A_CORE
Interim events:  EEG preliminary read (not final) on the initial recording hour(s) = x9PM  Substantial artifacts:  Mainly right ?facial clonic myogenic/movement artifacts near 1-1.5hz, (face not visible on video).  with intermittently more prominent superimposed rapid eye motion artifacts.    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  Clinical correlation required.     Impression: Possibly more opsoclonus/myoclonus in nature, EEG with substantial artifact.    Additional recommendations:  [] please increase maintenance dosing of vimpat to 300 mg BID   [] please confirm WA interval < 200 with EKG    Further recommendations to come in AM.  Discussed with epilepsy team, generall neurology attending on call. Interim events:  EEG preliminary read (not final) on the initial recording hour(s) = x9PM  Substantial artifacts:  Mainly right ?facial clonic myogenic/movement artifacts near 1-1.5hz, (face not visible on video).  with intermittently more prominent superimposed rapid eye motion artifacts.    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  Clinical correlation required.     Impression: Possibly more opsoclonus/myoclonus in nature, EEG with substantial artifact.    Additional recommendations:  [] please increase maintenance dosing of vimpat to 300 mg BID   [x] please confirm NV interval < 200 with EKG ->     Recommendations conveyed to primary team.   Discussed with epilepsy team, general neurology attending on call.  Neurology team to continue to follow. Thank you.

## 2025-03-28 NOTE — CONSULT NOTE ADULT - SUBJECTIVE AND OBJECTIVE BOX
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 On 3/27, patient was at a doctor's appointment and collapsed in the office. EMS arrived in 20 minutes after call for EMS placed. Patient was wo pulse, CPR initiated and ROSC after 20-25minutes, sp 1x defib for Vfib. Total downtime could be up to 40-45minutes. Stabilized at [facility] and brought to [facility].   Neurology consulted for unresponsiveness iso of cardiac arrest.  Additional collateral for cardiac history: 12/2021 hospitalization Patient deemed NOT to be a candidate for cardiac transplant due to poor functional status with both objective/subjective frailty which would not improve with transplantation. Additional disqualification reasons, patient does not have systolic dysfunction or signs of congestion so HF team signed off. CTS deemed patient NOT to be a candidate for CABG dt diffuse coronary aneurysm. Patient underwent MRA head without contrast, CTA of neck, abdomen and pelvis, and lower extremity arterial duplex. Above imaging revealed i) basis pontis chronic hemorrhage versus cavernoma with pontine and cerebellar atrophy; ii) Chronic right basal ganglia lacunar infarct; iii) No evidence for vascular aneurysm; iv) Decreased flow within the left middle cerebral artery without definite focal stenosis, may be artifactual in nature; v) CTA would be valuable for further evaluation. Neurology and Neurosx consulted and rec outpatient follow up for repeat imaging to monitor for stability.       Review of Systems: unable to complete dt mentation    Allergies:  No Known Allergies      PMHx/PSHx/Family Hx: As above, otherwise see below   HTN (hypertension)    CVA (cerebrovascular accident)    Coronary artery aneurysm        Social Hx:  No current use of tobacco, alcohol, or illicit drugs  Lives with ***    Medications:  MEDICATIONS  (STANDING):  aMIOdarone Infusion 1 mG/Min (33.3 mL/Hr) IV Continuous <Continuous>  aMIOdarone Infusion 0.5 mG/Min (16.7 mL/Hr) IV Continuous <Continuous>  chlorhexidine 0.12% Liquid 15 milliLiter(s) Oral Mucosa every 12 hours  chlorhexidine 2% Cloths 1 Application(s) Topical <User Schedule>  heparin  Infusion.  Unit(s)/Hr (11 mL/Hr) IV Continuous <Continuous>  norepinephrine Infusion 0.05 MICROgram(s)/kG/Min (3.05 mL/Hr) IV Continuous <Continuous>  pantoprazole  Injectable 40 milliGRAM(s) IV Push daily  piperacillin/tazobactam IVPB.. 3.375 Gram(s) IV Intermittent every 12 hours  propofol Infusion 10 MICROgram(s)/kG/Min (3.9 mL/Hr) IV Continuous <Continuous>    MEDICATIONS  (PRN):  heparin   Injectable 5000 Unit(s) IV Push every 6 hours PRN For aPTT less than 40  heparin   Injectable 2500 Unit(s) IV Push every 6 hours PRN For aPTT between 40 - 57      Vitals:  T(C): 37.8 (03-28-25 @ 08:00), Max: 37.8 (03-28-25 @ 08:00)  HR: 75 (03-28-25 @ 08:00) (69 - 92)  BP: 94/78 (03-27-25 @ 20:00) (89/76 - 94/78)  RR: 18 (03-28-25 @ 08:00) (12 - 18)  SpO2: 100% (03-28-25 @ 08:00) (98% - 100%)    Physical Examination: INCOMPLETE  General - NAD    Neurologic Exam:  Mental status - Awake, Alert, Oriented to person, place, and time. Speech fluent, repetition and naming intact. Follows simple and complex commands. Attention/concentration, recent and remote memory (including registration and recall), and fund of knowledge intact    Cranial nerves - PERRLA, VFF, EOMI, face sensation (V1-V3) intact b/l, facial strength intact without asymmetry b/l, hearing intact b/l, palate with symmetric elevation, trapezius OR sternocleidomastiod 5/5 strength b/l, tongue midline on protrusion with full lateral movement    Motor - Normal bulk and tone throughout. No pronator drift.  Strength testing            Deltoid      Biceps      Triceps     Wrist Extension    Wrist Flexion     Interossei         R            5                 5               5                     5                              5                        5                 5  L             5                 5               5                     5                              5                        5                 5              Hip Flexion    Hip Extension    Knee Flexion    Knee Extension    Dorsiflexion    Plantar Flexion  R              5                           5                       5                           5                            5                          5  L              5                           5                        5                           5                            5                          5    Sensation - Light touch/temperature OR pain/vibration intact throughout    DTR's -             Biceps      Triceps     Brachioradialis      Patellar    Ankle    Toes/plantar response  R             2+             2+                  2+                       2+            2+                 Down  L              2+             2+                 2+                        2+           2+                 Down    Coordination - Finger to Nose and heel to shin intact b/l. No tremors appreciated    Gait and station - Normal casual gait. Romberg (-)    Labs:                        10.8   20.18 )-----------( 174      ( 27 Mar 2025 21:52 )             35.1     03-28    136  |  100  |  32[H]  ----------------------------<  207[H]  3.8   |  15[L]  |  1.57[H]    Ca    8.0[L]      28 Mar 2025 04:31  Phos  1.9     03-28  Mg     2.30     03-28    TPro  6.2  /  Alb  3.5  /  TBili  0.4  /  DBili  x   /  AST  983[H]  /  ALT  784[H]  /  AlkPhos  66  03-28    CAPILLARY BLOOD GLUCOSE        LIVER FUNCTIONS - ( 28 Mar 2025 04:31 )  Alb: 3.5 g/dL / Pro: 6.2 g/dL / ALK PHOS: 66 U/L / ALT: 784 U/L / AST: 983 U/L / GGT: x             PT/INR - ( 27 Mar 2025 21:52 )   PT: 12.6 sec;   INR: 1.09 ratio         PTT - ( 27 Mar 2025 21:52 )  PTT:29.0 sec  CSF:                  Radiology:  CT Head No Cont:  (28 Mar 2025 09:18)     Neurology - Consult Note    -  Spectra: [phone] ([facility]), [phone] ([facility])  -    44 y/o F with PMH of HTN, 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 On 3/27, patient was at a doctor's appointment and collapsed in the office. EMS arrived in 20 minutes after call for EMS placed. Patient was wo pulse, CPR initiated and ROSC after 20-25minutes, sp 1x defib for Vfib. Total downtime could be up to 40-45minutes. Stabilized at [facility] and brought to [facility]. Hospitalization at [facility] complicated by ongoing leukocytosis, TISH, transaminitis, florid UTI.  Neurology consulted for unresponsiveness iso of cardiac arrest.  Additional collateral for cardiac history: 12/2021 hospitalization Patient deemed NOT to be a candidate for cardiac transplant due to poor functional status with both objective/subjective frailty which would not improve with transplantation. Additional disqualification reasons, patient does not have systolic dysfunction or signs of congestion so HF team signed off. CTS deemed patient NOT to be a candidate for CABG dt diffuse coronary aneurysm. Patient underwent MRA head without contrast, CTA of neck, abdomen and pelvis, and lower extremity arterial duplex. Above imaging revealed i) basis pontis chronic hemorrhage versus cavernoma with pontine and cerebellar atrophy; ii) Chronic right basal ganglia lacunar infarct; iii) No evidence for vascular aneurysm; iv) Decreased flow within the left middle cerebral artery without definite focal stenosis, may be artifactual in nature; v) CTA would be valuable for further evaluation. Neurology and Neurosx consulted and rec outpatient follow up for repeat imaging to monitor for stability.       Review of Systems: unable to complete dt mentation    Allergies:  No Known Allergies      PMHx/PSHx/Family Hx: As above, otherwise see below   HTN (hypertension)    CVA (cerebrovascular accident)    Coronary artery aneurysm    Medications:  MEDICATIONS  (STANDING):  aMIOdarone Infusion 1 mG/Min (33.3 mL/Hr) IV Continuous <Continuous>  aMIOdarone Infusion 0.5 mG/Min (16.7 mL/Hr) IV Continuous <Continuous>  chlorhexidine 0.12% Liquid 15 milliLiter(s) Oral Mucosa every 12 hours  chlorhexidine 2% Cloths 1 Application(s) Topical <User Schedule>  heparin  Infusion.  Unit(s)/Hr (11 mL/Hr) IV Continuous <Continuous>  norepinephrine Infusion 0.05 MICROgram(s)/kG/Min (3.05 mL/Hr) IV Continuous <Continuous>  pantoprazole  Injectable 40 milliGRAM(s) IV Push daily  piperacillin/tazobactam IVPB.. 3.375 Gram(s) IV Intermittent every 12 hours  propofol Infusion 10 MICROgram(s)/kG/Min (3.9 mL/Hr) IV Continuous <Continuous>    MEDICATIONS  (PRN):  heparin   Injectable 5000 Unit(s) IV Push every 6 hours PRN For aPTT less than 40  heparin   Injectable 2500 Unit(s) IV Push every 6 hours PRN For aPTT between 40 - 57      Vitals:  T(C): 37.8 (03-28-25 @ 08:00), Max: 37.8 (03-28-25 @ 08:00)  HR: 75 (03-28-25 @ 08:00) (69 - 92)  BP: 94/78 (03-27-25 @ 20:00) (89/76 - 94/78)  RR: 18 (03-28-25 @ 08:00) (12 - 18)  SpO2: 100% (03-28-25 @ 08:00) (98% - 100%)    Physical Examination:   GCS - turned off prior to attending assessment of patient  mental status - not over breathing vent, eyes partly open  CN: vertical pendular nystagmus, some myoclonus of the mouth/lower face, PERRL, OCR, cough to suction intact; BTT and corneals not evident  sensation: no movement or grimace to noxiousanywhere  motor: no clonus, flaccid tone thru out      Labs:                        10.8   20.18 )-----------( 174      ( 27 Mar 2025 21:52 )             35.1     03-28    136  |  100  |  32[H]  ----------------------------<  207[H]  3.8   |  15[L]  |  1.57[H]    Ca    8.0[L]      28 Mar 2025 04:31  Phos  1.9     03-28  Mg     2.30     03-28    TPro  6.2  /  Alb  3.5  /  TBili  0.4  /  DBili  x   /  AST  983[H]  /  ALT  784[H]  /  AlkPhos  66  03-28    CAPILLARY BLOOD GLUCOSE        LIVER FUNCTIONS - ( 28 Mar 2025 04:31 )  Alb: 3.5 g/dL / Pro: 6.2 g/dL / ALK PHOS: 66 U/L / ALT: 784 U/L / AST: 983 U/L / GGT: x             PT/INR - ( 27 Mar 2025 21:52 )   PT: 12.6 sec;   INR: 1.09 ratio         PTT - ( 27 Mar 2025 21:52 )  PTT:29.0 sec  CSF:                  Radiology:  [facility] 03/28/2025  IMPRESSION: No evidence of an acute transcortical infarction or hemorrhage.       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 On 3/27, patient was at a doctor's appointment and collapsed in the office. EMS arrived in 20 minutes after call for EMS placed. Patient was wo pulse, CPR initiated and ROSC after 20-25minutes, sp 1x defib for Vfib. Total downtime could be up to 40-45minutes. Stabilized at [facility] and brought to [facility]. Hospitalization at [facility] complicated by ongoing leukocytosis, TISH, transaminitis, florid UTI.  Neurology consulted for unresponsiveness iso of cardiac arrest.  Additional collateral for cardiac history: 12/2021 hospitalization Patient deemed NOT to be a candidate for cardiac transplant due to poor functional status with both objective/subjective frailty which would not improve with transplantation. Additional disqualification reasons, patient does not have systolic dysfunction or signs of congestion so HF team signed off. CTS deemed patient NOT to be a candidate for CABG dt diffuse coronary aneurysm. Patient underwent MRA head without contrast, CTA of neck, abdomen and pelvis, and lower extremity arterial duplex. Above imaging revealed i) basis pontis chronic hemorrhage versus cavernoma with pontine and cerebellar atrophy; ii) Chronic right basal ganglia lacunar infarct; iii) No evidence for vascular aneurysm; iv) Decreased flow within the left middle cerebral artery without definite focal stenosis, may be artifactual in nature; v) CTA would be valuable for further evaluation. Neurology and Neurosx consulted and rec outpatient follow up for repeat imaging to monitor for stability.       Review of Systems: unable to complete dt mentation    Allergies:  No Known Allergies      PMHx/PSHx/Family Hx: As above, otherwise see below   HTN (hypertension)    CVA (cerebrovascular accident)    Coronary artery aneurysm    Medications:  MEDICATIONS  (STANDING):  aMIOdarone Infusion 1 mG/Min (33.3 mL/Hr) IV Continuous <Continuous>  aMIOdarone Infusion 0.5 mG/Min (16.7 mL/Hr) IV Continuous <Continuous>  chlorhexidine 0.12% Liquid 15 milliLiter(s) Oral Mucosa every 12 hours  chlorhexidine 2% Cloths 1 Application(s) Topical <User Schedule>  heparin  Infusion.  Unit(s)/Hr (11 mL/Hr) IV Continuous <Continuous>  norepinephrine Infusion 0.05 MICROgram(s)/kG/Min (3.05 mL/Hr) IV Continuous <Continuous>  pantoprazole  Injectable 40 milliGRAM(s) IV Push daily  piperacillin/tazobactam IVPB.. 3.375 Gram(s) IV Intermittent every 12 hours  propofol Infusion 10 MICROgram(s)/kG/Min (3.9 mL/Hr) IV Continuous <Continuous>    MEDICATIONS  (PRN):  heparin   Injectable 5000 Unit(s) IV Push every 6 hours PRN For aPTT less than 40  heparin   Injectable 2500 Unit(s) IV Push every 6 hours PRN For aPTT between 40 - 57      Vitals:  T(C): 37.8 (03-28-25 @ 08:00), Max: 37.8 (03-28-25 @ 08:00)  HR: 75 (03-28-25 @ 08:00) (69 - 92)  BP: 94/78 (03-27-25 @ 20:00) (89/76 - 94/78)  RR: 18 (03-28-25 @ 08:00) (12 - 18)  SpO2: 100% (03-28-25 @ 08:00) (98% - 100%)    Physical Examination:   Sedation turned off prior to attending assessment of patient    eyes partly open, gaze fixed in primary, not attending, no response to voice or noxious  PERRL sluggishly, vertical pendular nystagmus, some myoclonus of the mouth/lower face, +OCR, +cough to suction intact  No BTT  no corneals  Not overbreathing vent  Flaccid throughout  no movement or grimace to noxious anywhere  No clonus    Labs:                        10.8   20.18 )-----------( 174      ( 27 Mar 2025 21:52 )             35.1     03-28    136  |  100  |  32[H]  ----------------------------<  207[H]  3.8   |  15[L]  |  1.57[H]    Ca    8.0[L]      28 Mar 2025 04:31  Phos  1.9     03-28  Mg     2.30     03-28    TPro  6.2  /  Alb  3.5  /  TBili  0.4  /  DBili  x   /  AST  983[H]  /  ALT  784[H]  /  AlkPhos  66  03-28    CAPILLARY BLOOD GLUCOSE        LIVER FUNCTIONS - ( 28 Mar 2025 04:31 )  Alb: 3.5 g/dL / Pro: 6.2 g/dL / ALK PHOS: 66 U/L / ALT: 784 U/L / AST: 983 U/L / GGT: x             PT/INR - ( 27 Mar 2025 21:52 )   PT: 12.6 sec;   INR: 1.09 ratio         PTT - ( 27 Mar 2025 21:52 )  PTT:29.0 sec  CSF:                  Radiology:  [facility] 03/28/2025  IMPRESSION: No evidence of an acute transcortical infarction or hemorrhage.

## 2025-03-29 NOTE — DISCHARGE NOTE PROVIDER - HOSPITAL COURSE
46 y/o wheelchair bound female with PMHx of HTN, CVAx2 (2015) s/p trach and PEG (reversed)c with residual right sided weakness, large coronary artery aneurysms (2021) anterior and superior to LV with calcification/thrombus within wall compressing LA as well as proximal dilatation of RCA. 2021 TTE: normal biventricular function, [facility] 2021. showed large saccular aneurysm of proximal LM with LAD and LCx coming off confluence and medium localized aneurysm of RCA. Per CT surgery, notCABG or transplant candidate. 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   presents to [facility] from routine visit to neurologist (Dr Ortiz) after syncopal event. When EMS arrived she had no pulse and CPR was initiated, ROSC after 20-25minutes. Unclear downtime prior to initiation of CPR but EMS arrived 20minutes after they were called. Total downtime could be up to 40-45minutes. She was defibrillated for Vfib. Stabilized at [facility] and brought to [facility]. She had a [facility] on admission, no intervention performed.   < from: Cardiac Catheterization (03.27.25 @ 17:16) >  Excessively ectatic left main coronary artery resulting in difficult  to opacify LAD and Cx coronaries. RCA with proximal segment  ectasia and slow coronary flow. Severe LV systolic dysfunction with at  least moderate-severe mitral regurgitation.     1. Left ventricular cavity is normal in size. Left ventricular systolic function is normal with an ejection fraction visually estimated at 55 to 60 %.   2. There is hypokinesis of the basal inferoseptal, basal inferolateral and basal inferior walls.   3. There is mild (grade 1) left ventricular diastolic dysfunction, with normal left ventricular filling pressure.   4. Normal left and right atrial size.   5. No significant valvular disease.   6. Estimated pulmonary artery systolic pressure is 18 mmHg.   7. Large well circumscribed echolucent mass seen adjacent to the left heart in the subcostal view, finding likely consistent with know large coronary aneurysm.   8. No pericardial effusion seen.   9. The inferior vena cava is normal in size measuring 1.25 cm in diameter, (normal <2.1cm) with normal inspiratory collapse (normal >50%) consistent with normal right atrial pressure (~3, range 0-5mmHg).    transferred to CCU after [facility] without intervention (no intervention). 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 [facility] on admission re-demonstrating aneurysmal dilation w/ no intervention performed. Admitted to CCU for further management.    Hospital Course:  Patient intubated, sedated. CT head demonstrating diffuse anoxia. No response to noxious stimuli, no mental status, no purposeful movement, questionable gag reflex, otherwise no other brain stem reflexes. Hospital course c/b tonic/clonic seizures possibly 2.2 to anoxic brain injury. EEG c/w severe diffuse cerebral dysfunction, nonspecific in etiology, abundant rhythmic/periodic patterns mostly in the left frontal region, at times generalizing. Patient w/ persistent fevers, completed 10x day course of empiric zosyn, but fevers persistent, thought to be central in etiology. Patient's mother (also her Health Care Proxy) signed DNR, no pressures on 03/31. Ongoing GOCs: plan for possible palliative extubation 04/07.     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 [facility] on admission re-demonstrating aneurysmal dilation w/ no intervention performed. Admitted to CCU for further management.    Hospital Course:  Patient intubated, sedated. CT head demonstrating diffuse anoxia. No response to noxious stimuli, no mental status, no purposeful movement, questionable gag reflex, otherwise no other brain stem reflexes. Hospital course c/b tonic/clonic seizures possibly 2.2 to anoxic brain injury. EEG c/w severe diffuse cerebral dysfunction, nonspecific in etiology, abundant rhythmic/periodic patterns mostly in the left frontal region, at times generalizing. Patient w/ persistent fevers, completed 10x day course of empiric zosyn, but fevers persistent, thought to be central in etiology. Patient's mother (also her Health Care Proxy) signed DNR, no pressures on . Patient on registry for organ donation -- q6 labs checked per organ procurement protocol. Hgb 6.7 day of palliative extubation -> transfused 2U pRBCs per organ donation organization. Patient to OR for palliative extubation and organ procurement on . Patient  at ____.     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 [facility] on admission re-demonstrating aneurysmal dilation w/ no intervention performed. Admitted to CCU for further management.    Hospital Course:  Patient intubated, sedated. CT head demonstrating diffuse anoxia. No response to noxious stimuli, no mental status, no purposeful movement, questionable gag reflex, otherwise no other brain stem reflexes. Hospital course c/b tonic/clonic seizures possibly 2.2 to anoxic brain injury. EEG c/w severe diffuse cerebral dysfunction, nonspecific in etiology, abundant rhythmic/periodic patterns mostly in the left frontal region, at times generalizing. Patient w/ persistent fevers, completed 10x day course of empiric zosyn, but fevers persistent, thought to be central in etiology. Patient's mother (also her Health Care Proxy) signed DNR, no pressures on . Patient on registry for organ donation -- q6 labs checked per organ procurement protocol. Hgb 6.7 day of palliative extubation -> transfused 2U pRBCs per organ donation organization. Patient to OR for palliative extubation and organ procurement on . Patient  at 2025.    Important Medication Changes and Reasons  STARTED  CHANGED  STOPPED    Active or Pending Issues Requiring Follow-up    Advanced Directives  DNR/Intubate    Discharge Diagnosis   Anoxic Brain Injury  Cardiac Arrest

## 2025-03-29 NOTE — PROGRESS NOTE ADULT - SUBJECTIVE AND OBJECTIVE BOX
*************************************  NEUROLOGY PROGRESS NOTE  **************************************    LIZZIE ESPINOZA  Female  MRN-[id number]    Subjective:  Patient seen and examined at bedside. Patient intubated, off sedation. Not overbreathing vent.     -------------------------------------------------------------------------------------------------------------------------------------------------------------------------------------------------------------------------    Objective:    VITAL SIGNS:  Vital Signs Last 24 Hrs  T(C): 38.6 (29 Mar 2025 12:00), Max: 38.6 (29 Mar 2025 12:00)  T(F): 101.5 (29 Mar 2025 12:00), Max: 101.5 (29 Mar 2025 12:00)  HR: 102 (29 Mar 2025 11:00) (73 - 107)  BP: --  BP(mean): --  RR: 14 (29 Mar 2025 11:00) (14 - 18)  SpO2: 100% (29 Mar 2025 11:00) (100% - 100%)    Parameters below as of 29 Mar 2025 08:00  Patient On (Oxygen Delivery Method): ventilator        PHYSICAL EXAMINATION:    General: Nonresponsive patient, lying in bed and in no acute distress  Eyes: Conjunctiva and sclera clear  Neurologic:  - Mental Status: Comatose, no eye opening, verbal output or movement to pain  - Cranial Nerves II-XII:  Pupils are 3 mm, equal, round, and reactive to light.  Vestibular occular reflex and corneal reflex present. Gag reflex present.  She has involuntary movement of L facial muscles and jaw, plus blepharospasms. No nystagmus noted     - Motor: Spastic throughout, no spontaneous movement. No decorticate or decerebrate posturing on exam. Triple flexion to pain present.   - Reflexes: deferred due to focused exam  - Sensory: No grimaces to pain in all extremities.  - Coordination & Gait: Unable to obtain.    --------------------------------------------------------------------------------------------------------------------------------------------------------------------------------------------------------------------------    LABS:                          9.8    21.13 )-----------( 133      ( 29 Mar 2025 04:20 )             29.2     03-29    133[L]  |  101  |  34[H]  ----------------------------<  131[H]  3.5   |  19[L]  |  1.89[H]    Ca    7.8[L]      29 Mar 2025 04:20  Phos  2.9     03-29  Mg     2.20     03-29    TPro  6.1  /  Alb  3.2[L]  /  TBili  0.6  /  DBili  x   /  AST  397[H]  /  ALT  497[H]  /  AlkPhos  64  03-29    PT/INR - ( 27 Mar 2025 21:52 )   PT: 12.6 sec;   INR: 1.09 ratio         PTT - ( 29 Mar 2025 10:08 )  PTT:49.9 sec      RADIOLOGY & ADDITIONAL STUDIES:    CT Head No Cont:  (28 Mar 2025 09:18)      FINDINGS:  Right pontine hypodensity on image 14 of series 10 in the patient's known   area of cavernoma versus encephalomalacia.  No acute intracranial hemorrhage. There is no compelling evidence for an   acute transcortical infarction. There is no evidence of mass, mass   effect, midline shift or extra-axial fluid collection. The lateral   ventricles and cortical sulci are age-appropriate in size and   configuration. The orbits, mastoid air cells and visualized paranasal   sinuses are unremarkable. The calvarium is intact. Consider MRI as   clinically warranted.    IMPRESSION: No evidence of an acute transcortical infarction or   hemorrhage.      EEG 3/29 17h:  EEG Classification / Summary:  Abnormal EEG study  In the context of substantial movement artifacts and poor video:  Right facial / jaw clonus near 1-1.5hz unremitting.  Difficult to discern any spike component on EEG.  Associated movement artifacts superimposed on myogenic artifacts at times with stimulation/arousal.  In addition, substantial eye motion artifacts, with nystagmus noted on bedside exam per resident.    Suspect diffuse attenuation, some higher amplitude activity may  be present over the left frontal region.    -----------------------------------------------------------------------------------------------------    Clinical Impression:  Heavily artifactual recording.  Suspect severe diffuse/multi-focal cerebral dysfunction, worse in the right hemisphere.  Right sided clonic jaw/facial movements may represent EPC/focal motor seizure activity, with limited EEG.  Associated nystagmus clinically, with associated quasi-rhythmic artifacts on EEG.   Clinical correlation required in the context of prior pontine lesion, known nystagmus/gaze abnormalities, and recent anoxia.     *************************************  NEUROLOGY PROGRESS NOTE  **************************************    LIZZIE ESPINOZA  Female  MRN-[id number]    Subjective:  Patient seen and examined at bedside. Patient intubated, off sedation. Not overbreathing vent.  Myoclonus is more subtle, opsoclonus / vertical pendular nystagmus has seemingly ceased.  ASM increased yesterday afternoon.    -------------------------------------------------------------------------------------------------------------------------------------------------------------------------------------------------------------------------    Objective:    VITAL SIGNS:  Vital Signs Last 24 Hrs  T(C): 38.6 (29 Mar 2025 12:00), Max: 38.6 (29 Mar 2025 12:00)  T(F): 101.5 (29 Mar 2025 12:00), Max: 101.5 (29 Mar 2025 12:00)  HR: 102 (29 Mar 2025 11:00) (73 - 107)  BP: --  BP(mean): --  RR: 14 (29 Mar 2025 11:00) (14 - 18)  SpO2: 100% (29 Mar 2025 11:00) (100% - 100%)    Parameters below as of 29 Mar 2025 08:00  Patient On (Oxygen Delivery Method): ventilator        PHYSICAL EXAMINATION:    General: Nonresponsive patient, lying in bed and in no acute distress  Eyes: Conjunctiva and sclera clear  Neurologic:  - Mental Status: Comatose, no eye opening, verbal output or movement to pain  - Cranial Nerves II-XII:  Pupils are 3 mm, equal, round, and reactive to light.  Vestibular occular reflex and corneal reflex present. Gag reflex present.  She has involuntary movement of L facial muscles and jaw, plus blepharospasms. No nystagmus noted     - Motor: Spastic throughout, no spontaneous movement. No decorticate or decerebrate posturing on exam. Triple flexion to pain present.   - Reflexes: deferred due to focused exam  - Sensory: No grimaces to pain in all extremities.  - Coordination & Gait: Unable to obtain.    --------------------------------------------------------------------------------------------------------------------------------------------------------------------------------------------------------------------------    LABS:                          9.8    21.13 )-----------( 133      ( 29 Mar 2025 04:20 )             29.2     03-29    133[L]  |  101  |  34[H]  ----------------------------<  131[H]  3.5   |  19[L]  |  1.89[H]    Ca    7.8[L]      29 Mar 2025 04:20  Phos  2.9     03-29  Mg     2.20     03-29    TPro  6.1  /  Alb  3.2[L]  /  TBili  0.6  /  DBili  x   /  AST  397[H]  /  ALT  497[H]  /  AlkPhos  64  03-29    PT/INR - ( 27 Mar 2025 21:52 )   PT: 12.6 sec;   INR: 1.09 ratio         PTT - ( 29 Mar 2025 10:08 )  PTT:49.9 sec      RADIOLOGY & ADDITIONAL STUDIES:    CT Head No Cont:  (28 Mar 2025 09:18)      FINDINGS:  Right pontine hypodensity on image 14 of series 10 in the patient's known   area of cavernoma versus encephalomalacia.  No acute intracranial hemorrhage. There is no compelling evidence for an   acute transcortical infarction. There is no evidence of mass, mass   effect, midline shift or extra-axial fluid collection. The lateral   ventricles and cortical sulci are age-appropriate in size and   configuration. The orbits, mastoid air cells and visualized paranasal   sinuses are unremarkable. The calvarium is intact. Consider MRI as   clinically warranted.    IMPRESSION: No evidence of an acute transcortical infarction or   hemorrhage.      EEG 3/29 17h:  EEG Classification / Summary:  Abnormal EEG study  In the context of substantial movement artifacts and poor video:  Right facial / jaw clonus near 1-1.5hz unremitting.  Difficult to discern any spike component on EEG.  Associated movement artifacts superimposed on myogenic artifacts at times with stimulation/arousal.  In addition, substantial eye motion artifacts, with nystagmus noted on bedside exam per resident.    Suspect diffuse attenuation, some higher amplitude activity may  be present over the left frontal region.    -----------------------------------------------------------------------------------------------------    Clinical Impression:  Heavily artifactual recording.  Suspect severe diffuse/multi-focal cerebral dysfunction, worse in the right hemisphere.  Right sided clonic jaw/facial movements may represent EPC/focal motor seizure activity, with limited EEG.  Associated nystagmus clinically, with associated quasi-rhythmic artifacts on EEG.   Clinical correlation required in the context of prior pontine lesion, known nystagmus/gaze abnormalities, and recent anoxia.

## 2025-03-29 NOTE — PROGRESS NOTE ADULT - ATTENDING COMMENTS
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 On 3/27, patient was at a doctor's appointment and collapsed in the office. EMS arrived in 20 minutes after call for EMS placed. Patient was wo pulse, CPR initiated and ROSC after 20-25minutes, sp 1x defib for Vfib. Total downtime could be up to 40-45minutes. Stabilized at [facility] and brought to [facility]. Hospitalization at [facility] complicated by ongoing leukocytosis, TISH, transaminitis, florid UTI. On exam, involuntary clonic movements of jaw and eyelids, otherwise comatose, off sedation. Prognosis still somewhat guarded though EEG is concerning with poor background. Suspect next scan will be telling.    IMPRESSION:  Unresponsiveness following cardiac arrest. Exam consistent w diffuse cerebral anoxic injury with questionable myoclonus vs seizures. Differentials include toxic metabolic encephalopathy vs seizures.    Diag:  [] repeat CT head [place] 3/30 vs Monday 3/31  [] If CT not revealing unequivocal widespread anoxic brain injury, then plan for MRI brain wo contrast 5-7 days post arrest (04/1/25 - 04/03/25)  [] neuron specific enolase 24-48 hours and 72 hours post arrest  ---1s NSE: arrest occurred on 3/27. Please collect NSE some time between 1230 3/28 and 1230 03/29  ---2nd NSE: Please collect NSE some time on 03/30  [] vEEG, continue for now  [] infectious work up: blood c/s, urine culture given UTI on UA

## 2025-03-29 NOTE — DIETITIAN INITIAL EVALUATION ADULT - ADD RECOMMEND
- Monitor tolerance to diet/supplement, nutrition related lab values, weight trends, BMs/GI distress, hydration status, skin integrity

## 2025-03-29 NOTE — DISCHARGE NOTE PROVIDER - NSDCMRMEDTOKEN_GEN_ALL_CORE_FT
amLODIPine 10 mg oral tablet: 1 tab(s) orally once a day  aspirin 81 mg oral delayed release tablet: 1 tab(s) orally once a day  atenolol 100 mg oral tablet: 1 tab(s) orally once a day  cloNIDine 0.1 mg oral tablet: 1 tab(s) orally 2 times a day  Eliquis 2.5 mg oral tablet: 1 tab(s) orally 2 times a day  losartan 25 mg oral tablet: 1 tab(s) orally once a day

## 2025-03-29 NOTE — DIETITIAN INITIAL EVALUATION ADULT - NS FNS DIET ORDER
Diet, NPO with Tube Feed:   Tube Feeding Modality: Orogastric  Jevity 1.5 [place] (JEVITY1.5RTH)  Total Volume for 24 Hours (mL): 960  Continuous  Starting Tube Feed Rate {mL per Hour}: 10  Increase Tube Feed Rate by (mL): 10     Every 4 hours  Until Goal Tube Feed Rate (mL per Hour): 40  Tube Feed Duration (in Hours): 24  Tube Feed Start Time: 18:00  Free Water Flush (03-28-25 @ 17:44)

## 2025-03-29 NOTE — DIETITIAN INITIAL EVALUATION ADULT - PERTINENT LABORATORY DATA
03-29    133[L]  |  101  |  34[H]  ----------------------------<  131[H]  3.5   |  19[L]  |  1.89[H]    Ca    7.8[L]      29 Mar 2025 04:20  Phos  2.9     03-29  Mg     2.20     03-29    TPro  6.1  /  Alb  3.2[L]  /  TBili  0.6  /  DBili  x   /  AST  397[H]  /  ALT  497[H]  /  AlkPhos  64  03-29  A1C with Estimated Average Glucose Result: 5.3 % (03-28-25 @ 04:31)

## 2025-03-29 NOTE — DIETITIAN INITIAL EVALUATION ADULT - OTHER INFO
45 year old female with a PMH of HTN, stroke presenting for syncope per chart.    Patient currently ordered for Jevity 1.5 @ 40 mL/hr. x 24 hrs. for total volume 960 mL w/ plan to start per ACP. Provides 1,440 kcal, 61 g pro and 730 mL of fluid (TF free water). No GI distress reported. Has no food allergies. Unable to obtain UBW at this time. Last HIE weight is 64.9 kg (8/31/22). 45 year old female with a PMH of HTN, stroke presenting for syncope per chart.    Patient currently ordered for Jevity 1.5 @ 40 mL/hr. x 24 hrs. for total volume 960 mL w/ plan to start per ACP. Provides 1,440 kcal, 61 g pro and 730 mL of fluid (TF free water). No GI distress reported. Has no food allergies. Unable to obtain UBW at this time. Last HIE weight is 64.9 kg (8/31/22). ABW is 72.4 kg (3/28) per chart. No edema or pressure injuries noted per RN flow sheet. 45 year old female with a PMH of HTN, stroke presenting for syncope per chart.    Patient currently ordered for Jevity 1.5 @ 40 mL/hr. x 24 hrs. for total volume 960 mL w/ plan to start today per ACP. Provides 1,440 kcal, 61 g pro and 730 mL of fluid (TF free water). No GI distress reported. Has no food allergies. Unable to obtain UBW at this time. Last HIE weight is 64.9 kg (8/31/22). ABW is 72.4 kg (3/28) per chart. No edema or pressure injuries noted per RN flow sheet.

## 2025-03-29 NOTE — PROGRESS NOTE ADULT - ASSESSMENT
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 On 3/27, patient was at a doctor's appointment and collapsed in the office. EMS arrived in 20 minutes after call for EMS placed. Patient was wo pulse, CPR initiated and ROSC after 20-25minutes, sp 1x defib for Vfib. Total downtime could be up to 40-45minutes. Stabilized at [facility] and brought to [facility]. Hospitalization at [facility] complicated by ongoing leukocytosis, TISH, transaminitis, florid UTI. On exam, involuntary clonic movements of jaw and eyelids, otherwise comatose, off sedation    IMPRESSION:  Unresponsiveness following cardiac arrest. Exam consistent w diffuse cerebral anoxic injury with questionable myoclonus vs seizures. Differentials include toxic metabolic encephalopathy vs seizures.    Diag:  [] MRI brain wo contrast 5-7 days post arrest (04/1/25 - 04/03/25)  [] STAT PRN repeat CTH wo contrast for acute change in neurologic status  [] neuron specific enolase 24-48 hours and 72 hours post arrest  ---1s NSE: arrest occurred on 3/27. Please collect NSE some time between 1230 3/28 and 1230 03/29  ---2nd NSE: Please collect NSE some time on 03/30  [] vEEG, continue for now  [] Delirium w/u: Vit. b 12, folate, B6, thiamine, ETOH level, troponin level, LFT, TSH, Urine toxicology, ammonia  [] infectious work up: blood c/s, urine culture given UTI on UA    Meds:  [] continue Vimpat 300mg BID for now  [] Hold narcotics/sedatives as able    misc and manage:  [] keep eunatremic, MAP>65, normal pH level, and euthermic  [] general Neuro check q4h  [] Fall and aspiration precautions    Case seen and discussed with Dr. Colón

## 2025-03-29 NOTE — DIETITIAN INITIAL EVALUATION ADULT - PERTINENT MEDS FT
MEDICATIONS  (STANDING):  chlorhexidine 0.12% Liquid 15 milliLiter(s) Oral Mucosa every 12 hours  chlorhexidine 2% Cloths 1 Application(s) Topical <User Schedule>  heparin  Infusion 800 Unit(s)/Hr (9 mL/Hr) IV Continuous <Continuous>  lacosamide 300 milliGRAM(s) Oral two times a day  pantoprazole  Injectable 40 milliGRAM(s) IV Push daily  piperacillin/tazobactam IVPB.. 3.375 Gram(s) IV Intermittent every 12 hours    MEDICATIONS  (PRN):

## 2025-03-29 NOTE — EEG REPORT - NS EEG TEXT BOX
LIZZIE ESPINOZA [id number]     Study Date: 	03-28-25 14:59-08:00	03-29-25  Duration in hours:  x17h    --------------------------------------------------------------------------------------------------  History:  CC/ HPI Patient is a 45y old  Female who presents with a chief complaint of ST elevation myocardial infarction (STEMI)     (29 Mar 2025 08:58)    MEDICATIONS  (STANDING):  heparin  Infusion 800 Unit(s)/Hr (9 mL/Hr) IV Continuous <Continuous>  lacosamide 300 milliGRAM(s) Oral two times a day  pantoprazole  Injectable 40 milliGRAM(s) IV Push daily  piperacillin/tazobactam IVPB.. 3.375 Gram(s) IV Intermittent every 12 hours    --------------------------------------------------------------------------------------------------  Study Interpretation:    [[[Abbreviation Key:  PDR=alpha rhythm/posterior dominant rhythm. A-P=anterior posterior gradient.  Amplitude: ‘very low’:<20; ‘low’:20-50; ‘medium’:; ‘high’:>200uV.  Persistence for periodic/rhythmic patterns (% of epoch) ‘rare’:<1%; ‘occasional’:1-10%; ‘frequent’:10-50%; ‘abundant’:50-90%; ‘continuous’:>90%.  Persistence for sporadic discharges: ‘rare’:<1/hr; ‘occasional’:1/min-1/hr; ‘frequent’:>1/min; ‘abundant’:>1/10 sec.  GRDA=generalized rhythmic delta activity; FIRDA=frontal intermittent GRDA; LRDA=lateralized rhythmic delta activity; TIRDA=temporal intermittent rhythmic delta activity;  LPD=PLED=lateralized periodic discharges; GPD=generalized periodic discharges; BiPDs=BiPLEDs=bilateral independent periodic epileptiform discharges; SIRPID=stimulus induced rhythmic, periodic, or ictal appearing discharges; BIRDs=brief potentially ictal rhythmic discharges >4 Hz, lasting .5-10s; PFA=paroxysmal bursts of beta/gamma; LVFA=low voltage fast activity.  Modifiers: +F=with fast component; +S=with spike component; +R=with rhythmic component.  S-B=burst suppression pattern.  Max=maximal. N1-drowsy; N2-stage II sleep; N3-slow wave sleep. SSS/BETS=small sharp spikes/benign epileptiform transients of sleep. HV=hyperventilation; PS=photic stimulation]]]    FINDINGS:      Substantial movement artifacts:  Right facial / jaw clonus near 1-1.5hz unremitting.  Difficult to discern any spike component on EEG.  Associated movement artifacts superimposed on myogenic artifacts at times with stimulation/arousal.  In addition, substantial eye motion artifacts, with nystagmus  noted on exam per resident.      Background:  Continuity: mostly attenuated background   Symmetry: symmetric  PDR: none  Reactivity: increased movement abnormalities noted with stimulation  Voltage: low  Anterior Posterior Gradient: none  Other background findings: none  Breach: absent    Background Slowing:  Generalized slowing: diffuse low amplitude irregular delta, though in the context of movement artifacts, higher amplitude over the left frontotemporal region  Focal slowing: none was present.    State Changes:   -N2 sleep transients were not recorded.    Sporadic Epileptiform Discharges:    Difficult to ascertain due to artifacts    Rhythmic and Periodic Patterns (RPPs):  Difficult to ascertain due to artifacts, but clear spike component not present.  Possible left frontal LRDA near 1.5hz noted at times, but may be artifactual    Electrographic and Electroclinical seizures:  As described    Other Clinical Events:  None    Activation Procedures:   -Hyperventilation was not performed.    -Photic stimulation was not performed.    ECG:  The heart rate on single channel ECG was predominantly between BPM = 70-90    EEG Classification / Summary:  Abnormal EEG study  In the context of substantial movement artifacts and poor video:  Right facial / jaw clonus near 1-1.5hz unremitting.  Difficult to discern any spike component on EEG.  Associated movement artifacts superimposed on myogenic artifacts at times with stimulation/arousal.  In addition, substantial eye motion artifacts, with nystagmus noted on bedside exam per resident.    Suspect diffuse attenuation, some higher amplitude slowing over the left.    -----------------------------------------------------------------------------------------------------    Clinical Impression:  Heavily artifactual recording.  Suspect severe diffuse/multi-focal cerebral dysfunction, worse in the right hemisphere.  Right sided clonic jaw/facial movements may represent EPC/focal motor seizure activity, with limited EEG.  Associated nystagmus clinically, with associated quasi-rhythmic artifacts on EEG.   Clinical correlation required in the context of prior pontine lesion, known nystagmus/gaze abnormalities, and recent anoxia.    -------------------------------------------------------------------------------------------------------  [facility] EEG Reading Room Ph#: [phone]  Epilepsy Answering Service after 5PM and before 8:30AM: Ph#: [phone]    Bacilio Mistry M.D.   of Neurology, [facility] Epilepsy [place]	   LIZZIE ESPINOZA [id number]     Study Date: 	03-28-25 14:59-08:00	03-29-25  Duration in hours:  x17h    --------------------------------------------------------------------------------------------------  History:  CC/ HPI Patient is a 45y old  Female who presents with a chief complaint of ST elevation myocardial infarction (STEMI)     (29 Mar 2025 08:58)    MEDICATIONS  (STANDING):  heparin  Infusion 800 Unit(s)/Hr (9 mL/Hr) IV Continuous <Continuous>  lacosamide 300 milliGRAM(s) Oral two times a day  pantoprazole  Injectable 40 milliGRAM(s) IV Push daily  piperacillin/tazobactam IVPB.. 3.375 Gram(s) IV Intermittent every 12 hours    --------------------------------------------------------------------------------------------------  Study Interpretation:    [[[Abbreviation Key:  PDR=alpha rhythm/posterior dominant rhythm. A-P=anterior posterior gradient.  Amplitude: ‘very low’:<20; ‘low’:20-50; ‘medium’:; ‘high’:>200uV.  Persistence for periodic/rhythmic patterns (% of epoch) ‘rare’:<1%; ‘occasional’:1-10%; ‘frequent’:10-50%; ‘abundant’:50-90%; ‘continuous’:>90%.  Persistence for sporadic discharges: ‘rare’:<1/hr; ‘occasional’:1/min-1/hr; ‘frequent’:>1/min; ‘abundant’:>1/10 sec.  GRDA=generalized rhythmic delta activity; FIRDA=frontal intermittent GRDA; LRDA=lateralized rhythmic delta activity; TIRDA=temporal intermittent rhythmic delta activity;  LPD=PLED=lateralized periodic discharges; GPD=generalized periodic discharges; BiPDs=BiPLEDs=bilateral independent periodic epileptiform discharges; SIRPID=stimulus induced rhythmic, periodic, or ictal appearing discharges; BIRDs=brief potentially ictal rhythmic discharges >4 Hz, lasting .5-10s; PFA=paroxysmal bursts of beta/gamma; LVFA=low voltage fast activity.  Modifiers: +F=with fast component; +S=with spike component; +R=with rhythmic component.  S-B=burst suppression pattern.  Max=maximal. N1-drowsy; N2-stage II sleep; N3-slow wave sleep. SSS/BETS=small sharp spikes/benign epileptiform transients of sleep. HV=hyperventilation; PS=photic stimulation]]]    FINDINGS:      Substantial movement artifacts:  Right facial / jaw clonus near 1-1.5hz unremitting.  Difficult to discern any spike component on EEG.  Associated movement artifacts superimposed on myogenic artifacts at times with stimulation/arousal.  In addition, substantial eye motion artifacts, with nystagmus  noted on exam per resident.      Background:  Continuity: mostly attenuated background   Symmetry: symmetric  PDR: none  Reactivity: increased movement abnormalities noted with stimulation  Voltage: low  Anterior Posterior Gradient: none  Other background findings: none  Breach: absent    Background Slowing:  Generalized slowing: diffuse low amplitude irregular delta, though in the context of movement artifacts, higher amplitude over the left frontotemporal region  Focal slowing: none was present.    State Changes:   -N2 sleep transients were not recorded.    Sporadic Epileptiform Discharges:    Difficult to ascertain due to artifacts    Rhythmic and Periodic Patterns (RPPs):  Difficult to ascertain due to artifacts, but clear spike component not present.  Possible left frontal LRDA near 1.5hz noted at times, but may be artifactual due to movements    Electrographic and Electroclinical seizures:  As described    Other Clinical Events:  None    Activation Procedures:   -Hyperventilation was not performed.    -Photic stimulation was not performed.    ECG:  The heart rate on single channel ECG was predominantly between BPM = 70-90    EEG Classification / Summary:  Abnormal EEG study  In the context of substantial movement artifacts and poor video:  Right facial / jaw clonus near 1-1.5hz unremitting.  Difficult to discern any spike component on EEG.  Associated movement artifacts superimposed on myogenic artifacts at times with stimulation/arousal.  In addition, substantial eye motion artifacts, with nystagmus noted on bedside exam per resident.    Suspect diffuse attenuation, some higher amplitude activity may  be present over the left frontal region.    -----------------------------------------------------------------------------------------------------    Clinical Impression:  Heavily artifactual recording.  Suspect severe diffuse/multi-focal cerebral dysfunction, worse in the right hemisphere.  Right sided clonic jaw/facial movements may represent EPC/focal motor seizure activity, with limited EEG.  Associated nystagmus clinically, with associated quasi-rhythmic artifacts on EEG.   Clinical correlation required in the context of prior pontine lesion, known nystagmus/gaze abnormalities, and recent anoxia.    -------------------------------------------------------------------------------------------------------  [facility] EEG Reading Room Ph#: [phone]  Epilepsy Answering Service after 5PM and before 8:30AM: Ph#: [phone]    Bacilio Mistry M.D.   of Neurology, [facility] Epilepsy [place]

## 2025-03-29 NOTE — PROGRESS NOTE ADULT - SUBJECTIVE AND OBJECTIVE BOX
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 2021 TTE: normal biventricular function, [facility] 2021. showed large saccular aneurysm of proximal LM with LAD and LCx coming off confluence and medium localized aneurysm of RCA. Per CT surgery, notCABG or transplant candidate. 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   presents to [facility] from routine visit to neurologist (Dr Ortiz) after syncopal event. When EMS arrived she had no pulse and CPR was initiated, ROSC after 20-25minutes. Unclear downtime prior to initiation of CPR but EMS arrived 20minutes after they were called. Total downtime could be up to 40-45minutes. She was defibrillated for Vfib. Stabilized at [facility] and brought to [facility]. She had a LHC on admission, no intervention performed.       transferred to CCU after LHC without intervention (no intervention). With swan-aileen 0530 HORACE CO 7.83 CI 4.55 CVP 5  MVO2 80%      INTERVAL HISTORY/OVERNIGHT EVENTS: coughing overnight when suctioned, pupils pinpoint, fixed  450/16/60/6  Heparin gtt      TELEMETRY: SR one episode NSVT 4 beats    EKG: SR, borderline repol, std V3,4,5      transferred to CCU for    INTERVAL HISTORY/OVERNIGHT EVENTS:     TELEMETRY:     EKG:       ALLERGIES: Allergies    No Known Allergies    Intolerances        MEDICATIONS:  MEDICATIONS  (STANDING):  chlorhexidine 0.12% Liquid 15 milliLiter(s) Oral Mucosa every 12 hours  chlorhexidine 2% Cloths 1 Application(s) Topical <User Schedule>  heparin  Infusion 800 Unit(s)/Hr (9 mL/Hr) IV Continuous <Continuous>  lacosamide 300 milliGRAM(s) Oral two times a day  pantoprazole  Injectable 40 milliGRAM(s) IV Push daily  piperacillin/tazobactam IVPB.. 3.375 Gram(s) IV Intermittent every 12 hours  potassium chloride   Powder 40 milliEquivalent(s) Oral once    MEDICATIONS  (PRN):        OBJECTIVE:  ICU Vital Signs Last 24 Hrs  T(C): 37.7 (28 Mar 2025 16:00), Max: 37.8 (28 Mar 2025 08:00)  T(F): 99.9 (28 Mar 2025 16:00), Max: 100 (28 Mar 2025 08:00)  HR: 91 (29 Mar 2025 03:47) (71 - 93)  BP: --  BP(mean): --  ABP: 122/77 (29 Mar 2025 03:00) (85/60 - 137/89)  ABP(mean): 93 (29 Mar 2025 03:00) (36 - 107)  RR: 18 (29 Mar 2025 03:00) (16 - 18)  SpO2: 100% (29 Mar 2025 03:47) (100% - 100%)    O2 Parameters below as of 28 Mar 2025 08:00  Patient On (Oxygen Delivery Method): ventilator          Mode: AC/ CMV (Assist Control/ Continuous Mandatory Ventilation)  RR (machine): 18  TV (machine): 450  FiO2: 60  PEEP: 6  ITime: 0.8  MAP: 10  PIP: 20    I&O's Summary    27 Mar 2025 07:01  -  28 Mar 2025 07:00  --------------------------------------------------------  IN: 679.7 mL / OUT: 1360 mL / NET: -680.3 mL    28 Mar 2025 07:01  -  29 Mar 2025 05:40  --------------------------------------------------------  IN: 440 mL / OUT: 1000 mL / NET: -560 mL        PHYSICAL EXAMINATION:  General: Comfortable, no acute distress, cooperative with exam.  HEENT: Moist mucous membranes.  Respiratory: CTAB, normal respiratory effort, no coughing, wheezes, crackles, or rales.  CV: RRR, S1S2, no murmurs, rubs or gallops. No JVD. Distal pulses intact.  Abdominal: Soft, nontender, nondistended, no rebound or guarding, normal bowel sounds.  Neurology: AOx3, no focal neuro defects, DARDEN x 4.  Extremities: No pitting edema, + Peripheral pulses.          LABS:  ABG - ( 29 Mar 2025 04:20 )  pH, Arterial: 7.55  pH, Blood: x     /  pCO2: 24    /  pO2: 189   / HCO3: 21    / Base Excess: -0.4  /  SaO2: 99.6                                    9.8    21.13 )-----------( 133      ( 29 Mar 2025 04:20 )             29.2     03-29    133[L]  |  101  |  34[H]  ----------------------------<  131[H]  3.5   |  19[L]  |  1.89[H]    Ca    7.8[L]      29 Mar 2025 04:20  Phos  2.9     03-29  Mg     2.20     03-29    TPro  6.1  /  Alb  3.2[L]  /  TBili  0.6  /  DBili  x   /  AST  397[H]  /  ALT  497[H]  /  AlkPhos  64  03-29    LIVER FUNCTIONS - ( 29 Mar 2025 04:20 )  Alb: 3.2 g/dL / Pro: 6.1 g/dL / ALK PHOS: 64 U/L / ALT: 497 U/L / AST: 397 U/L / GGT: x           PT/INR - ( 27 Mar 2025 21:52 )   PT: 12.6 sec;   INR: 1.09 ratio         PTT - ( 29 Mar 2025 04:20 )  PTT:48.8 sec  CKMB Units: 78.6 ng/mL (03-29 @ 04:20)    CARDIAC MARKERS ( 29 Mar 2025 04:20 )  x     / x     / x     / x     / 78.6 ng/mL  CARDIAC MARKERS ( 27 Mar 2025 21:52 )  x     / x     / x     / x     / 162.5 ng/mL      Urinalysis Basic - ( 29 Mar 2025 04:20 )    Color: x / Appearance: x / SG: x / pH: x  Gluc: 131 mg/dL / Ketone: x  / Bili: x / Urobili: x   Blood: x / Protein: x / Nitrite: x   Leuk Esterase: x / RBC: x / WBC x   Sq Epi: x / Non Sq Epi: x / Bacteria: x    < from: TTE W or WO Ultrasound Enhancing Agent (03.28.25 @ 07:36) >  CONCLUSIONS:      1. Left ventricular cavity is normal in size. Left ventricular systolic function is normal with an ejection fraction visually estimated at 55 to 60 %.   2. There is hypokinesis of the basal inferoseptal, basal inferolateral and basal inferior walls.   3. There is mild (grade 1) left ventricular diastolic dysfunction, with normal left ventricular filling pressure.   4. Normal left and right atrial size.   5. No significant valvular disease.   6. Estimated pulmonary artery systolic pressure is 18 mmHg.   7. Large well circumscribed echolucent mass seen adjacent to the left heart in the subcostal view, finding likely consistent with known large coronary aneurysm.   8. No pericardial effusion seen.   9. The inferior vena cava is normal in size measuring 1.25 cm in diameter, (normal <2.1cm) with normal inspiratory collapse (normal >50%) consistent with normal right atrial pressure (~3, range 0-5mmHg).    ________________________________________________________________________________________  FINDINGS:     Left Ventricle:  Complications from contrast: no adverse reaction. The left ventricular cavity is normal in size. Left ventricular wall thickness is mildly increased. Left ventricular systolic function is normal with an ejection fraction visually estimated at 55 to 60%. Left ventricularregional wall motion assessment reveals hypokinesis of the basal inferoseptal, basal inferolateral and basal inferior walls. There is normal LV mass and concentric remodeling. There is mild (grade 1) left ventricular diastolic dysfunction, with normal left ventricular filling pressure.     Right Ventricle:  The right ventricular cavity is normal in size and right ventricular systolic function is mildly reduced. Tricuspid annular plane systolic excursion (TAPSE) is 1.6 cm (normal >=1.7 cm).     Left Atrium:  The left atrium is normal in size with an indexed volume of 16.91 ml/m².     Right Atrium:  The right atrium is normal in size with an indexed volume of 15.62 ml/m² and an indexed area of 7.00 cm²/m².     Interatrial Septum:  The interatrial septum appears intact.     Aortic Valve:  The aortic valve appears trileaflet with normal systolic excursion. There is no aortic valve stenosis.     Mitral Valve:  Structurally normal mitral valve with normal leaflet excursion. There is normal leaflet mobility of the mitral valve. There is no mitral valve stenosis. There is mild mitral regurgitation. The mitral regurgitant jet is eccentrically directed.     Tricuspid Valve:  Structurally normal tricuspid valve with normal leaflet excursion. There is trace tricuspid regurgitation. Estimated pulmonary artery systolic pressure is 18 mmHg.     Pulmonic Valve:  Structurally normal pulmonic valve with normal leaflet excursion. There is no pulmonic valve stenosis. There is trace pulmonic regurgitation.     Aorta:  The aortic root at the sinuses of Valsalva is normal in size, measuring 3.10 cm (indexed 1.81 cm/m²). The ascending aorta is normal in size, measuring 2.70 cm (indexed 1.57 cm/m²).     Pericardium:  No pericardial effusion seen. Pericardial cyst is seen (5.9 cm) and appears well circumscribed, in the left ventricle, without sepation.     Systemic Veins:  The inferior vena cava is normal in size measuring 1.25 cm in diameter, (normal <2.1cm) with normal inspiratory collapse (normal >50%) consistent with normal right atrial pressure (~3, range 0-5mmHg).    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 Since, she has required assistive devices to ambulate along with 1 person assist. In 2021, she was diagnosed with large coronary artery aneurysms as part of a workup for chest tightness. Cardiac CT showed large aneurysmal dilatation located anterior and superior to LV with calcification/thrombus within wall and compressing LA as well as proximal dilatation of RCA. TTE showed normal biventricular function. LHC showed large saccular aneurysm of proximal LM with LAD and LCx coming off confluence and medium localized aneurysm of RCA. Per CT surgery, determined to not be a CABG or transplant candidate. 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     On 3/27 she was at a doctor's appointment and synopsized in the office. When EMS arrived she had no pulse and CPR was initiated, ROSC after 20-25minutes. Unclear downtime prior to initiation of CPR but EMS arrived 20minutes after they were called. Total downtime could be up to 40-45minutes. She was defibrillated for Vfib. Stabilized at [facility] and brought to [facility]. She had a [facility] on admission, no intervention performed.     NEURO  -Baseline: alert and oriented x3, wheelchair bound  - < from: CT Head No Cont (03.28.25 @ 09:18) >  IMPRESSION: No evidence of an acute transcortical infarction or   hemorrhage.    #diffuse cerebral anoxic injury v seizures   -Propofol gtt, turn off 3/28 10 AM  - Neuro recs appreciated  - repeat CTH 3/30 wo contrast for acute change in neurologic status  - neuron specific enolase 24-48 hours and 72 hours post arrest AM labs: 3/29, 3/31  - Video EEG  - Delium w/u: Vit. b 12, folate, B6, thiamine, ETOH level, troponin level, LFT, TSH, Urine toxicology, ammonia  - HoId MRI brain wo contrast 5-7 days post arrest (04/1/25 - 04/03/25)  - general Neuro check q4h  - EEG, Frequent bifrontal seizures and lateralized rhythmic delta activity (LRDA).  Discussed with neurology team.  - right facial clonic myogenic/movement artifacts near 1-1.5hz, (face not visible on video).  with intermittently more prominent superimposed rapid eye motion artifacts. (from pontine lesion also a consideration)  - Ativan 4mg IVP x 1, Vimpat 200mg IVP x 1  - maintenance dosing of vimpat to 300 mg BID   - confirm MT interval < 200 with EKG ->         PULM  #Respiratory failure due to cardiac arrest  -AC 16/450/100/6  -Check CXR daily  *  ET tube at the level of the charli. Recommend repositioning.  *  [place] catheter with tip at the level of the main pulmonary artery   segment.  *  Enteric tube with tip in the stomach.  *  Clear lungs.        CARDS  - Left ventricular systolic function is normal with an ejection fraction visually estimated at 55 to 60 %.  -  There is hypokinesis of the basal inferoseptal, basal inferolateral and basal inferior walls.  - There is mild (grade 1) left ventricular diastolic dysfunction, with normal left ventricular filling pressure.  - pbnp 1268   0530 HORACE CO 7.83 CI 4.55 CVP 5  MVO2 80%  Dc'd swan      #Vfib arrest  - Last [facility] 2021: mild CAD, aneurysm LM, LAD, CX, RCA  -Maintain amiodarone gtt   - trops 2500, ckmb 160 CKMB index 4  [facility] Diagnostic Conclusions:   Excessively ectatic left main coronary artery resulting in difficult  to opacify LAD and Cx coronaries. RCA with proximal segment  ectasia and slow coronary flow. Severe LV systolic dysfunction with at  least moderate-severe mitral regurgitation.  RA 10 mmHg.PA 31/19/24 mmHg. PCW 16 mmHg. Cardiac index 2.57  L/min/m2. SVR 1141 dsc.  - c/w  heparin gtt, blood pressure control  -  Appreciate CTS Dr Amando Aviles  - iso ischemia, coronary artery aneurysm surgery is indicated, appreciate the concerns regarding the patient's functional state.      GI  #OGT in place  -  tube feeds, jevity  - nutrition consult      TISH   - creatinine 1.14 in 2021  - now elevated 1.57 iso cardiac arrest    ENDO  - HgA1c 5.3  - TSH 0.73  -No active issues    ID  hyperthermic, leukocytosis  Empiric Zosyn started      HEME  #History of coronary thrombus   -Start heparin gtt  -Outpt: Eliquis/ASA    Ethics: Full code       CCU Service  Cardiology   Spect: [phone] ([facility])     PATIENT: LIZZIE ESPINOZA, MRN: [id number]    44 y/o wheelchair bound female with PMHx of HTN, CVAx2 (2015) s/p trach and PEG (reversed)c with residual right sided weakness, large coronary artery aneurysms (2021) anterior and superior to LV with calcification/thrombus within wall compressing LA as well as proximal dilatation of RCA. 2021 TTE: normal biventricular function, [facility] 2021. showed large saccular aneurysm of proximal LM with LAD and LCx coming off confluence and medium localized aneurysm of RCA. Per CT surgery, notCABG or transplant candidate. 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   presents to [facility] from routine visit to neurologist (Dr Ortiz) after syncopal event. When EMS arrived she had no pulse and CPR was initiated, ROSC after 20-25minutes. Unclear downtime prior to initiation of CPR but EMS arrived 20minutes after they were called. Total downtime could be up to 40-45minutes. She was defibrillated for Vfib. Stabilized at [facility] and brought to [facility]. She had a LHC on admission, no intervention performed.       transferred to CCU after LHC without intervention (no intervention). With swan-aileen 0530 HORACE CO 7.83 CI 4.55 CVP 5  MVO2 80%      INTERVAL HISTORY/OVERNIGHT EVENTS: coughing overnight when suctioned, pupils pinpoint, fixed  450/16/60/6  Heparin gtt      TELEMETRY: SR one episode NSVT 4 beats    EKG: SR, borderline repol, std V3,4,5      transferred to CCU for    INTERVAL HISTORY/OVERNIGHT EVENTS:     TELEMETRY:     EKG:       ALLERGIES: Allergies    No Known Allergies    Intolerances        MEDICATIONS:  MEDICATIONS  (STANDING):  chlorhexidine 0.12% Liquid 15 milliLiter(s) Oral Mucosa every 12 hours  chlorhexidine 2% Cloths 1 Application(s) Topical <User Schedule>  heparin  Infusion 800 Unit(s)/Hr (9 mL/Hr) IV Continuous <Continuous>  lacosamide 300 milliGRAM(s) Oral two times a day  pantoprazole  Injectable 40 milliGRAM(s) IV Push daily  piperacillin/tazobactam IVPB.. 3.375 Gram(s) IV Intermittent every 12 hours  potassium chloride   Powder 40 milliEquivalent(s) Oral once    MEDICATIONS  (PRN):        OBJECTIVE:  ICU Vital Signs Last 24 Hrs  T(C): 37.7 (28 Mar 2025 16:00), Max: 37.8 (28 Mar 2025 08:00)  T(F): 99.9 (28 Mar 2025 16:00), Max: 100 (28 Mar 2025 08:00)  HR: 91 (29 Mar 2025 03:47) (71 - 93)  BP: --  BP(mean): --  ABP: 122/77 (29 Mar 2025 03:00) (85/60 - 137/89)  ABP(mean): 93 (29 Mar 2025 03:00) (36 - 107)  RR: 18 (29 Mar 2025 03:00) (16 - 18)  SpO2: 100% (29 Mar 2025 03:47) (100% - 100%)    O2 Parameters below as of 28 Mar 2025 08:00  Patient On (Oxygen Delivery Method): ventilator          Mode: AC/ CMV (Assist Control/ Continuous Mandatory Ventilation)  RR (machine): 18  TV (machine): 450  FiO2: 60  PEEP: 6  ITime: 0.8  MAP: 10  PIP: 20    I&O's Summary    27 Mar 2025 07:01  -  28 Mar 2025 07:00  --------------------------------------------------------  IN: 679.7 mL / OUT: 1360 mL / NET: -680.3 mL    28 Mar 2025 07:01  -  29 Mar 2025 05:40  --------------------------------------------------------  IN: 440 mL / OUT: 1000 mL / NET: -560 mL        PHYSICAL EXAMINATION:  General: Comfortable, no acute distress, cooperative with exam.  HEENT: Moist mucous membranes.  Respiratory: CTAB, normal respiratory effort, no coughing, wheezes, crackles, or rales.  CV: RRR, S1S2, no murmurs, rubs or gallops. No JVD. Distal pulses intact.  Abdominal: Soft, nontender, nondistended, no rebound or guarding, normal bowel sounds.  Neurology: AOx3, no focal neuro defects, DARDEN x 4.  Extremities: No pitting edema, + Peripheral pulses.          LABS:  ABG - ( 29 Mar 2025 04:20 )  pH, Arterial: 7.55  pH, Blood: x     /  pCO2: 24    /  pO2: 189   / HCO3: 21    / Base Excess: -0.4  /  SaO2: 99.6                                    9.8    21.13 )-----------( 133      ( 29 Mar 2025 04:20 )             29.2     03-29    133[L]  |  101  |  34[H]  ----------------------------<  131[H]  3.5   |  19[L]  |  1.89[H]    Ca    7.8[L]      29 Mar 2025 04:20  Phos  2.9     03-29  Mg     2.20     03-29    TPro  6.1  /  Alb  3.2[L]  /  TBili  0.6  /  DBili  x   /  AST  397[H]  /  ALT  497[H]  /  AlkPhos  64  03-29    LIVER FUNCTIONS - ( 29 Mar 2025 04:20 )  Alb: 3.2 g/dL / Pro: 6.1 g/dL / ALK PHOS: 64 U/L / ALT: 497 U/L / AST: 397 U/L / GGT: x           PT/INR - ( 27 Mar 2025 21:52 )   PT: 12.6 sec;   INR: 1.09 ratio         PTT - ( 29 Mar 2025 04:20 )  PTT:48.8 sec  CKMB Units: 78.6 ng/mL (03-29 @ 04:20)    CARDIAC MARKERS ( 29 Mar 2025 04:20 )  x     / x     / x     / x     / 78.6 ng/mL  CARDIAC MARKERS ( 27 Mar 2025 21:52 )  x     / x     / x     / x     / 162.5 ng/mL      Urinalysis Basic - ( 29 Mar 2025 04:20 )    Color: x / Appearance: x / SG: x / pH: x  Gluc: 131 mg/dL / Ketone: x  / Bili: x / Urobili: x   Blood: x / Protein: x / Nitrite: x   Leuk Esterase: x / RBC: x / WBC x   Sq Epi: x / Non Sq Epi: x / Bacteria: x    < from: TTE W or WO Ultrasound Enhancing Agent (03.28.25 @ 07:36) >  CONCLUSIONS:      1. Left ventricular cavity is normal in size. Left ventricular systolic function is normal with an ejection fraction visually estimated at 55 to 60 %.   2. There is hypokinesis of the basal inferoseptal, basal inferolateral and basal inferior walls.   3. There is mild (grade 1) left ventricular diastolic dysfunction, with normal left ventricular filling pressure.   4. Normal left and right atrial size.   5. No significant valvular disease.   6. Estimated pulmonary artery systolic pressure is 18 mmHg.   7. Large well circumscribed echolucent mass seen adjacent to the left heart in the subcostal view, finding likely consistent with known large coronary aneurysm.   8. No pericardial effusion seen.   9. The inferior vena cava is normal in size measuring 1.25 cm in diameter, (normal <2.1cm) with normal inspiratory collapse (normal >50%) consistent with normal right atrial pressure (~3, range 0-5mmHg).    ________________________________________________________________________________________  FINDINGS:     Left Ventricle:  Complications from contrast: no adverse reaction. The left ventricular cavity is normal in size. Left ventricular wall thickness is mildly increased. Left ventricular systolic function is normal with an ejection fraction visually estimated at 55 to 60%. Left ventricularregional wall motion assessment reveals hypokinesis of the basal inferoseptal, basal inferolateral and basal inferior walls. There is normal LV mass and concentric remodeling. There is mild (grade 1) left ventricular diastolic dysfunction, with normal left ventricular filling pressure.     Right Ventricle:  The right ventricular cavity is normal in size and right ventricular systolic function is mildly reduced. Tricuspid annular plane systolic excursion (TAPSE) is 1.6 cm (normal >=1.7 cm).     Left Atrium:  The left atrium is normal in size with an indexed volume of 16.91 ml/m².     Right Atrium:  The right atrium is normal in size with an indexed volume of 15.62 ml/m² and an indexed area of 7.00 cm²/m².     Interatrial Septum:  The interatrial septum appears intact.     Aortic Valve:  The aortic valve appears trileaflet with normal systolic excursion. There is no aortic valve stenosis.     Mitral Valve:  Structurally normal mitral valve with normal leaflet excursion. There is normal leaflet mobility of the mitral valve. There is no mitral valve stenosis. There is mild mitral regurgitation. The mitral regurgitant jet is eccentrically directed.     Tricuspid Valve:  Structurally normal tricuspid valve with normal leaflet excursion. There is trace tricuspid regurgitation. Estimated pulmonary artery systolic pressure is 18 mmHg.     Pulmonic Valve:  Structurally normal pulmonic valve with normal leaflet excursion. There is no pulmonic valve stenosis. There is trace pulmonic regurgitation.     Aorta:  The aortic root at the sinuses of Valsalva is normal in size, measuring 3.10 cm (indexed 1.81 cm/m²). The ascending aorta is normal in size, measuring 2.70 cm (indexed 1.57 cm/m²).     Pericardium:  No pericardial effusion seen. Pericardial cyst is seen (5.9 cm) and appears well circumscribed, in the left ventricle, without sepation.     Systemic Veins:  The inferior vena cava is normal in size measuring 1.25 cm in diameter, (normal <2.1cm) with normal inspiratory collapse (normal >50%) consistent with normal right atrial pressure (~3, range 0-5mmHg).    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 Since, she has required assistive devices to ambulate along with 1 person assist. In 2021, she was diagnosed with large coronary artery aneurysms as part of a workup for chest tightness. Cardiac CT showed large aneurysmal dilatation located anterior and superior to LV with calcification/thrombus within wall and compressing LA as well as proximal dilatation of RCA. TTE showed normal biventricular function. LHC showed large saccular aneurysm of proximal LM with LAD and LCx coming off confluence and medium localized aneurysm of RCA. Per CT surgery, determined to not be a CABG or transplant candidate. 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     On 3/27 she was at a doctor's appointment and synopsized in the office. When EMS arrived she had no pulse and CPR was initiated, ROSC after 20-25minutes. Unclear downtime prior to initiation of CPR but EMS arrived 20minutes after they were called. Total downtime could be up to 40-45minutes. She was defibrillated for Vfib. Stabilized at [facility] and brought to [facility]. She had a [facility] on admission, no intervention performed.     NEURO  -Baseline: alert and oriented x3, wheelchair bound  - < from: CT Head No Cont (03.28.25 @ 09:18) >  IMPRESSION: No evidence of an acute transcortical infarction or   hemorrhage.    #diffuse cerebral anoxic injury v seizures   -Propofol gtt, turn off 3/28 10 AM  - Neuro recs appreciated  - repeat CTH 3/30 wo contrast for acute change in neurologic status  - neuron specific enolase 24-48 hours and 72 hours post arrest AM labs: 3/29, 3/31  - Video EEG  - Delium w/u: Vit. b 12, folate, B6, thiamine, ETOH level, troponin level, LFT, TSH, Urine toxicology, ammonia  - HoId MRI brain wo contrast 5-7 days post arrest (04/1/25 - 04/03/25)  - general Neuro check q4h  - EEG, Frequent bifrontal seizures and lateralized rhythmic delta activity (LRDA).  Discussed with neurology team.  - right facial clonic myogenic/movement artifacts near 1-1.5hz, (face not visible on video).  with intermittently more prominent superimposed rapid eye motion artifacts. (from pontine lesion also a consideration)  - Ativan 4mg IVP x 1, Vimpat 200mg IVP x 1  - maintenance dosing of vimpat to 300 mg BID   - confirm CO interval < 200 with EKG ->         PULM  #Respiratory failure due to cardiac arrest  -AC 16/450/100/6  -Check CXR daily  *  ET tube at the level of the charli. Recommend repositioning.  *  [place]-Aileen catheter with tip at the level of the main pulmonary artery   segment.  *  Enteric tube with tip in the stomach.  *  Clear lungs.        CARDS  - Left ventricular systolic function is normal with an ejection fraction visually estimated at 55 to 60 %.  -  There is hypokinesis of the basal inferoseptal, basal inferolateral and basal inferior walls.  - There is mild (grade 1) left ventricular diastolic dysfunction, with normal left ventricular filling pressure.  - pbnp 1268   0530 HORACE CO 7.83 CI 4.55 CVP 5  MVO2 80%  Dc'd swan      #Vfib arrest  - Last [facility] 2021: mild CAD, aneurysm LM, LAD, CX, RCA  -Maintain amiodarone gtt   - trops 2500, ckmb 160 CKMB index 4  - SRIKANTH inf lead  - [facility] Diagnostic Conclusions:   Excessively ectatic left main coronary artery resulting in difficult  to opacify LAD and Cx coronaries. RCA with proximal segment  ectasia and slow coronary flow. Severe LV systolic dysfunction with at  least moderate-severe mitral regurgitation.  RA 10 mmHg.PA 31/19/24 mmHg. PCW 16 mmHg. Cardiac index 2.57  L/min/m2. SVR 1141 dsc.  - c/w  heparin gtt, blood pressure control  -  Appreciate CTS Dr Amando Aviles  - iso ischemia, coronary artery aneurysm surgery is indicated, appreciate the concerns regarding the patient's functional state.      GI  #OGT in place  -  tube feeds, jevity  - nutrition consult      TISH   - creatinine 1.14 in 2021  - now elevated 1.57 iso cardiac arrest    ENDO  - HgA1c 5.3  - TSH 0.73  -No active issues    ID  hyperthermic, leukocytosis  Empiric Zosyn started      HEME  #History of coronary thrombus   -Start heparin gtt  -Outpt: Eliquis/ASA    Ethics: Full code       CCU Service  Cardiology   Spect: [id number] ([facility])     PATIENT: LIZZIE ESPINOZA, MRN: [id number]    46 y/o wheelchair bound female with PMHx of HTN, CVAx2 (2015) s/p trach and PEG (reversed)c with residual right sided weakness, large coronary artery aneurysms (2021) anterior and superior to LV with calcification/thrombus within wall compressing LA as well as proximal dilatation of RCA. 2021 TTE: normal biventricular function, [facility] 2021. showed large saccular aneurysm of proximal LM with LAD and LCx coming off confluence and medium localized aneurysm of RCA. Per CT surgery, notCABG or transplant candidate. 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   presents to [facility] from routine visit to neurologist (Dr Ortiz) after syncopal event. When EMS arrived she had no pulse and CPR was initiated, ROSC after 20-25minutes. Unclear downtime prior to initiation of CPR but EMS arrived 20minutes after they were called. Total downtime could be up to 40-45minutes. She was defibrillated for Vfib. Stabilized at [facility] and brought to [facility]. She had a LHC on admission, no intervention performed.       transferred to CCU after LHC without intervention (no intervention). With swan-aileen 0530 HORACE CO 7.83 CI 4.55 CVP 5  MVO2 80%      INTERVAL HISTORY/OVERNIGHT EVENTS: coughing overnight when suctioned, pupils pinpoint, fixed  450/16/60/6  Heparin gtt      TELEMETRY: SR one episode NSVT 4 beats    EKG: SR, borderline repol, std V3,4,5      transferred to CCU for    INTERVAL HISTORY/OVERNIGHT EVENTS:     TELEMETRY:     EKG:       ALLERGIES: Allergies    No Known Allergies    Intolerances        MEDICATIONS:  MEDICATIONS  (STANDING):  chlorhexidine 0.12% Liquid 15 milliLiter(s) Oral Mucosa every 12 hours  chlorhexidine 2% Cloths 1 Application(s) Topical <User Schedule>  heparin  Infusion 800 Unit(s)/Hr (9 mL/Hr) IV Continuous <Continuous>  lacosamide 300 milliGRAM(s) Oral two times a day  pantoprazole  Injectable 40 milliGRAM(s) IV Push daily  piperacillin/tazobactam IVPB.. 3.375 Gram(s) IV Intermittent every 12 hours  potassium chloride   Powder 40 milliEquivalent(s) Oral once    MEDICATIONS  (PRN):        OBJECTIVE:  ICU Vital Signs Last 24 Hrs  T(C): 37.7 (28 Mar 2025 16:00), Max: 37.8 (28 Mar 2025 08:00)  T(F): 99.9 (28 Mar 2025 16:00), Max: 100 (28 Mar 2025 08:00)  HR: 91 (29 Mar 2025 03:47) (71 - 93)  BP: --  BP(mean): --  ABP: 122/77 (29 Mar 2025 03:00) (85/60 - 137/89)  ABP(mean): 93 (29 Mar 2025 03:00) (36 - 107)  RR: 18 (29 Mar 2025 03:00) (16 - 18)  SpO2: 100% (29 Mar 2025 03:47) (100% - 100%)    O2 Parameters below as of 28 Mar 2025 08:00  Patient On (Oxygen Delivery Method): ventilator          Mode: AC/ CMV (Assist Control/ Continuous Mandatory Ventilation)  RR (machine): 18  TV (machine): 450  FiO2: 60  PEEP: 6  ITime: 0.8  MAP: 10  PIP: 20    I&O's Summary    27 Mar 2025 07:01  -  28 Mar 2025 07:00  --------------------------------------------------------  IN: 679.7 mL / OUT: 1360 mL / NET: -680.3 mL    28 Mar 2025 07:01  -  29 Mar 2025 05:40  --------------------------------------------------------  IN: 440 mL / OUT: 1000 mL / NET: -560 mL        PHYSICAL EXAMINATION:  General: Comfortable, no acute distress, cooperative with exam.  HEENT: Moist mucous membranes.  Respiratory: CTAB, normal respiratory effort, no coughing, wheezes, crackles, or rales.  CV: RRR, S1S2, no murmurs, rubs or gallops. No JVD. Distal pulses intact.  Abdominal: Soft, nontender, nondistended, no rebound or guarding, normal bowel sounds.  Neurology: AOx3, no focal neuro defects, DARDEN x 4.  Extremities: No pitting edema, + Peripheral pulses.          LABS:  ABG - ( 29 Mar 2025 04:20 )  pH, Arterial: 7.55  pH, Blood: x     /  pCO2: 24    /  pO2: 189   / HCO3: 21    / Base Excess: -0.4  /  SaO2: 99.6                                    9.8    21.13 )-----------( 133      ( 29 Mar 2025 04:20 )             29.2     03-29    133[L]  |  101  |  34[H]  ----------------------------<  131[H]  3.5   |  19[L]  |  1.89[H]    Ca    7.8[L]      29 Mar 2025 04:20  Phos  2.9     03-29  Mg     2.20     03-29    TPro  6.1  /  Alb  3.2[L]  /  TBili  0.6  /  DBili  x   /  AST  397[H]  /  ALT  497[H]  /  AlkPhos  64  03-29    LIVER FUNCTIONS - ( 29 Mar 2025 04:20 )  Alb: 3.2 g/dL / Pro: 6.1 g/dL / ALK PHOS: 64 U/L / ALT: 497 U/L / AST: 397 U/L / GGT: x           PT/INR - ( 27 Mar 2025 21:52 )   PT: 12.6 sec;   INR: 1.09 ratio         PTT - ( 29 Mar 2025 04:20 )  PTT:48.8 sec  CKMB Units: 78.6 ng/mL (03-29 @ 04:20)    CARDIAC MARKERS ( 29 Mar 2025 04:20 )  x     / x     / x     / x     / 78.6 ng/mL  CARDIAC MARKERS ( 27 Mar 2025 21:52 )  x     / x     / x     / x     / 162.5 ng/mL      Urinalysis Basic - ( 29 Mar 2025 04:20 )    Color: x / Appearance: x / SG: x / pH: x  Gluc: 131 mg/dL / Ketone: x  / Bili: x / Urobili: x   Blood: x / Protein: x / Nitrite: x   Leuk Esterase: x / RBC: x / WBC x   Sq Epi: x / Non Sq Epi: x / Bacteria: x    < from: TTE W or WO Ultrasound Enhancing Agent (03.28.25 @ 07:36) >  CONCLUSIONS:      1. Left ventricular cavity is normal in size. Left ventricular systolic function is normal with an ejection fraction visually estimated at 55 to 60 %.   2. There is hypokinesis of the basal inferoseptal, basal inferolateral and basal inferior walls.   3. There is mild (grade 1) left ventricular diastolic dysfunction, with normal left ventricular filling pressure.   4. Normal left and right atrial size.   5. No significant valvular disease.   6. Estimated pulmonary artery systolic pressure is 18 mmHg.   7. Large well circumscribed echolucent mass seen adjacent to the left heart in the subcostal view, finding likely consistent with known large coronary aneurysm.   8. No pericardial effusion seen.   9. The inferior vena cava is normal in size measuring 1.25 cm in diameter, (normal <2.1cm) with normal inspiratory collapse (normal >50%) consistent with normal right atrial pressure (~3, range 0-5mmHg).    ________________________________________________________________________________________  FINDINGS:     Left Ventricle:  Complications from contrast: no adverse reaction. The left ventricular cavity is normal in size. Left ventricular wall thickness is mildly increased. Left ventricular systolic function is normal with an ejection fraction visually estimated at 55 to 60%. Left ventricularregional wall motion assessment reveals hypokinesis of the basal inferoseptal, basal inferolateral and basal inferior walls. There is normal LV mass and concentric remodeling. There is mild (grade 1) left ventricular diastolic dysfunction, with normal left ventricular filling pressure.     Right Ventricle:  The right ventricular cavity is normal in size and right ventricular systolic function is mildly reduced. Tricuspid annular plane systolic excursion (TAPSE) is 1.6 cm (normal >=1.7 cm).     Left Atrium:  The left atrium is normal in size with an indexed volume of 16.91 ml/m².     Right Atrium:  The right atrium is normal in size with an indexed volume of 15.62 ml/m² and an indexed area of 7.00 cm²/m².     Interatrial Septum:  The interatrial septum appears intact.     Aortic Valve:  The aortic valve appears trileaflet with normal systolic excursion. There is no aortic valve stenosis.     Mitral Valve:  Structurally normal mitral valve with normal leaflet excursion. There is normal leaflet mobility of the mitral valve. There is no mitral valve stenosis. There is mild mitral regurgitation. The mitral regurgitant jet is eccentrically directed.     Tricuspid Valve:  Structurally normal tricuspid valve with normal leaflet excursion. There is trace tricuspid regurgitation. Estimated pulmonary artery systolic pressure is 18 mmHg.     Pulmonic Valve:  Structurally normal pulmonic valve with normal leaflet excursion. There is no pulmonic valve stenosis. There is trace pulmonic regurgitation.     Aorta:  The aortic root at the sinuses of Valsalva is normal in size, measuring 3.10 cm (indexed 1.81 cm/m²). The ascending aorta is normal in size, measuring 2.70 cm (indexed 1.57 cm/m²).     Pericardium:  No pericardial effusion seen. Pericardial cyst is seen (5.9 cm) and appears well circumscribed, in the left ventricle, without sepation.     Systemic Veins:  The inferior vena cava is normal in size measuring 1.25 cm in diameter, (normal <2.1cm) with normal inspiratory collapse (normal >50%) consistent with normal right atrial pressure (~3, range 0-5mmHg).    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 Since, she has required assistive devices to ambulate along with 1 person assist. In 2021, she was diagnosed with large coronary artery aneurysms as part of a workup for chest tightness. Cardiac CT showed large aneurysmal dilatation located anterior and superior to LV with calcification/thrombus within wall and compressing LA as well as proximal dilatation of RCA. TTE showed normal biventricular function. LHC showed large saccular aneurysm of proximal LM with LAD and LCx coming off confluence and medium localized aneurysm of RCA. Per CT surgery, determined to not be a CABG or transplant candidate. 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     On 3/27 she was at a doctor's appointment and synopsized in the office. When EMS arrived she had no pulse and CPR was initiated, ROSC after 20-25minutes. Unclear downtime prior to initiation of CPR but EMS arrived 20minutes after they were called. Total downtime could be up to 40-45minutes. She was defibrillated for Vfib. Stabilized at [facility] and brought to [facility]. She had a [facility] on admission, no intervention performed.     NEURO  -Baseline: alert and oriented x3, wheelchair bound  - < from: CT Head No Cont (03.28.25 @ 09:18) >  IMPRESSION: No evidence of an acute transcortical infarction or   hemorrhage.    #diffuse cerebral anoxic injury v seizures   -Propofol gtt, turn off 3/28 10 AM  - Neuro recs appreciated  - repeat CTH 3/30 wo contrast for acute change in neurologic status  - neuron specific enolase 24-48 hours and 72 hours post arrest AM labs: 3/29, 3/31  - Video EEG  - Delium w/u: Vit. b 12, folate, B6, thiamine, ETOH level, troponin level, LFT, TSH, Urine toxicology, ammonia  - [facility] MRI brain wo contrast 5-7 days post arrest (04/1/25 - 04/03/25)  - general Neuro check q4h  - EEG, Frequent bifrontal seizures and lateralized rhythmic delta activity (LRDA).  Discussed with neurology team.  - right facial clonic myogenic/movement artifacts near 1-1.5hz, (face not visible on video).  with intermittently more prominent superimposed rapid eye motion artifacts. (from pontine lesion also a consideration)  - Ativan 4mg IVP x 1, Vimpat 200mg IVP x 1  - maintenance dosing of vimpat to 300 mg BID   - confirm MA interval < 200 with EKG ->         PULM  #Respiratory failure due to cardiac arrest  -AC 16/450/100/6  -Check CXR daily  *  ET tube at the level of the charli. Recommend repositioning.  *  [place]-Aileen catheter with tip at the level of the main pulmonary artery   segment.  *  Enteric tube with tip in the stomach.  *  Clear lungs.        CARDS  - Left ventricular systolic function is normal with an ejection fraction visually estimated at 55 to 60 %.  -  There is hypokinesis of the basal inferoseptal, basal inferolateral and basal inferior walls.  - There is mild (grade 1) left ventricular diastolic dysfunction, with normal left ventricular filling pressure.  - pbnp 1268   0530 HORACE CO 7.83 CI 4.55 CVP 5  MVO2 80%  Dc'd swan      #Vfib arrest  - Last [facility] 2021: mild CAD, aneurysm LM, LAD, CX, RCA  -Maintain amiodarone gtt   - trops 2500, ckmb 160 CKMB index 4  - SRIKANTH inf lead  - [facility] Diagnostic Conclusions:   Excessively ectatic left main coronary artery resulting in difficult  to opacify LAD and Cx coronaries. RCA with proximal segment  ectasia and slow coronary flow. Severe LV systolic dysfunction with at  least moderate-severe mitral regurgitation.  RA 10 mmHg.PA 31/19/24 mmHg. PCW 16 mmHg. Cardiac index 2.57  L/min/m2. SVR 1141 dsc.  - c/w  heparin gtt, blood pressure control  -  Appreciate CTS Dr Amando Aviles  - iso ischemia, coronary artery aneurysm surgery is indicated, appreciate the concerns regarding the patient's functional state.      GI  #OGT in place  -  tube feeds, jevity  - nutrition consult      TISH   - creatinine 1.14 in 2021  - now elevated 1.57 iso cardiac arrest    ENDO  - HgA1c 5.3  - TSH 0.73    Anemia of chronic disease  Normal iron studies    ID  hyperthermic, leukocytosis  Empiric Zosyn started      HEME  #History of coronary thrombus   -Start heparin gtt  -Outpt: Eliquis/ASA    Ethics: Full code    Dispo Home PT       CCU Service  Cardiology   Spect: [id number] ([facility])     PATIENT: LIZZIE ESPINOZA, MRN: [id number]    46 y/o wheelchair bound female with PMHx of HTN, CVAx2 (2015) s/p trach and PEG (reversed)c with residual right sided weakness, large coronary artery aneurysms (2021) anterior and superior to LV with calcification/thrombus within wall compressing LA as well as proximal dilatation of RCA. 2021 TTE: normal biventricular function, [facility] 2021. showed large saccular aneurysm of proximal LM with LAD and LCx coming off confluence and medium localized aneurysm of RCA. Per CT surgery, notCABG or transplant candidate. 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   presents to [facility] from routine visit to neurologist (Dr Ortiz) after syncopal event. When EMS arrived she had no pulse and CPR was initiated, ROSC after 20-25minutes. Unclear downtime prior to initiation of CPR but EMS arrived 20minutes after they were called. Total downtime could be up to 40-45minutes. She was defibrillated for Vfib. Stabilized at [facility] and brought to [facility]. She had a LHC on admission, no intervention performed.       transferred to CCU after LHC without intervention (no intervention). With swan-aileen 0530 HORACE CO 7.83 CI 4.55 CVP 5  MVO2 80%      INTERVAL HISTORY/OVERNIGHT EVENTS: coughing overnight when suctioned, pupils pinpoint, fixed  450/16/60/6  Heparin gtt      TELEMETRY: SR one episode NSVT 4 beats    EKG: SR, borderline repol, std V3,4,5      transferred to CCU for    INTERVAL HISTORY/OVERNIGHT EVENTS:     TELEMETRY:     EKG:       ALLERGIES: Allergies    No Known Allergies    Intolerances        MEDICATIONS:  MEDICATIONS  (STANDING):  chlorhexidine 0.12% Liquid 15 milliLiter(s) Oral Mucosa every 12 hours  chlorhexidine 2% Cloths 1 Application(s) Topical <User Schedule>  heparin  Infusion 800 Unit(s)/Hr (9 mL/Hr) IV Continuous <Continuous>  lacosamide 300 milliGRAM(s) Oral two times a day  pantoprazole  Injectable 40 milliGRAM(s) IV Push daily  piperacillin/tazobactam IVPB.. 3.375 Gram(s) IV Intermittent every 12 hours  potassium chloride   Powder 40 milliEquivalent(s) Oral once    MEDICATIONS  (PRN):        OBJECTIVE:  ICU Vital Signs Last 24 Hrs  T(C): 37.7 (28 Mar 2025 16:00), Max: 37.8 (28 Mar 2025 08:00)  T(F): 99.9 (28 Mar 2025 16:00), Max: 100 (28 Mar 2025 08:00)  HR: 91 (29 Mar 2025 03:47) (71 - 93)  BP: --  BP(mean): --  ABP: 122/77 (29 Mar 2025 03:00) (85/60 - 137/89)  ABP(mean): 93 (29 Mar 2025 03:00) (36 - 107)  RR: 18 (29 Mar 2025 03:00) (16 - 18)  SpO2: 100% (29 Mar 2025 03:47) (100% - 100%)    O2 Parameters below as of 28 Mar 2025 08:00  Patient On (Oxygen Delivery Method): ventilator          Mode: AC/ CMV (Assist Control/ Continuous Mandatory Ventilation)  RR (machine): 18  TV (machine): 450  FiO2: 60  PEEP: 6  ITime: 0.8  MAP: 10  PIP: 20    I&O's Summary    27 Mar 2025 07:01  -  28 Mar 2025 07:00  --------------------------------------------------------  IN: 679.7 mL / OUT: 1360 mL / NET: -680.3 mL    28 Mar 2025 07:01  -  29 Mar 2025 05:40  --------------------------------------------------------  IN: 440 mL / OUT: 1000 mL / NET: -560 mL        PHYSICAL EXAMINATION:  General: Comfortable, no acute distress, cooperative with exam.  HEENT: Moist mucous membranes.  Respiratory: CTAB, normal respiratory effort, no coughing, wheezes, crackles, or rales.  CV: RRR, S1S2, no murmurs, rubs or gallops. No JVD. Distal pulses intact.  Abdominal: Soft, nontender, nondistended, no rebound or guarding, normal bowel sounds.  Neurology: AOx3, no focal neuro defects, DARDEN x 4.  Extremities: No pitting edema, + Peripheral pulses.          LABS:  ABG - ( 29 Mar 2025 04:20 )  pH, Arterial: 7.55  pH, Blood: x     /  pCO2: 24    /  pO2: 189   / HCO3: 21    / Base Excess: -0.4  /  SaO2: 99.6                                    9.8    21.13 )-----------( 133      ( 29 Mar 2025 04:20 )             29.2     03-29    133[L]  |  101  |  34[H]  ----------------------------<  131[H]  3.5   |  19[L]  |  1.89[H]    Ca    7.8[L]      29 Mar 2025 04:20  Phos  2.9     03-29  Mg     2.20     03-29    TPro  6.1  /  Alb  3.2[L]  /  TBili  0.6  /  DBili  x   /  AST  397[H]  /  ALT  497[H]  /  AlkPhos  64  03-29    LIVER FUNCTIONS - ( 29 Mar 2025 04:20 )  Alb: 3.2 g/dL / Pro: 6.1 g/dL / ALK PHOS: 64 U/L / ALT: 497 U/L / AST: 397 U/L / GGT: x           PT/INR - ( 27 Mar 2025 21:52 )   PT: 12.6 sec;   INR: 1.09 ratio         PTT - ( 29 Mar 2025 04:20 )  PTT:48.8 sec  CKMB Units: 78.6 ng/mL (03-29 @ 04:20)    CARDIAC MARKERS ( 29 Mar 2025 04:20 )  x     / x     / x     / x     / 78.6 ng/mL  CARDIAC MARKERS ( 27 Mar 2025 21:52 )  x     / x     / x     / x     / 162.5 ng/mL      Urinalysis Basic - ( 29 Mar 2025 04:20 )    Color: x / Appearance: x / SG: x / pH: x  Gluc: 131 mg/dL / Ketone: x  / Bili: x / Urobili: x   Blood: x / Protein: x / Nitrite: x   Leuk Esterase: x / RBC: x / WBC x   Sq Epi: x / Non Sq Epi: x / Bacteria: x    < from: TTE W or WO Ultrasound Enhancing Agent (03.28.25 @ 07:36) >  CONCLUSIONS:      1. Left ventricular cavity is normal in size. Left ventricular systolic function is normal with an ejection fraction visually estimated at 55 to 60 %.   2. There is hypokinesis of the basal inferoseptal, basal inferolateral and basal inferior walls.   3. There is mild (grade 1) left ventricular diastolic dysfunction, with normal left ventricular filling pressure.   4. Normal left and right atrial size.   5. No significant valvular disease.   6. Estimated pulmonary artery systolic pressure is 18 mmHg.   7. Large well circumscribed echolucent mass seen adjacent to the left heart in the subcostal view, finding likely consistent with known large coronary aneurysm.   8. No pericardial effusion seen.   9. The inferior vena cava is normal in size measuring 1.25 cm in diameter, (normal <2.1cm) with normal inspiratory collapse (normal >50%) consistent with normal right atrial pressure (~3, range 0-5mmHg).    ________________________________________________________________________________________  FINDINGS:     Left Ventricle:  Complications from contrast: no adverse reaction. The left ventricular cavity is normal in size. Left ventricular wall thickness is mildly increased. Left ventricular systolic function is normal with an ejection fraction visually estimated at 55 to 60%. Left ventricularregional wall motion assessment reveals hypokinesis of the basal inferoseptal, basal inferolateral and basal inferior walls. There is normal LV mass and concentric remodeling. There is mild (grade 1) left ventricular diastolic dysfunction, with normal left ventricular filling pressure.     Right Ventricle:  The right ventricular cavity is normal in size and right ventricular systolic function is mildly reduced. Tricuspid annular plane systolic excursion (TAPSE) is 1.6 cm (normal >=1.7 cm).     Left Atrium:  The left atrium is normal in size with an indexed volume of 16.91 ml/m².     Right Atrium:  The right atrium is normal in size with an indexed volume of 15.62 ml/m² and an indexed area of 7.00 cm²/m².     Interatrial Septum:  The interatrial septum appears intact.     Aortic Valve:  The aortic valve appears trileaflet with normal systolic excursion. There is no aortic valve stenosis.     Mitral Valve:  Structurally normal mitral valve with normal leaflet excursion. There is normal leaflet mobility of the mitral valve. There is no mitral valve stenosis. There is mild mitral regurgitation. The mitral regurgitant jet is eccentrically directed.     Tricuspid Valve:  Structurally normal tricuspid valve with normal leaflet excursion. There is trace tricuspid regurgitation. Estimated pulmonary artery systolic pressure is 18 mmHg.     Pulmonic Valve:  Structurally normal pulmonic valve with normal leaflet excursion. There is no pulmonic valve stenosis. There is trace pulmonic regurgitation.     Aorta:  The aortic root at the sinuses of Valsalva is normal in size, measuring 3.10 cm (indexed 1.81 cm/m²). The ascending aorta is normal in size, measuring 2.70 cm (indexed 1.57 cm/m²).     Pericardium:  No pericardial effusion seen. Pericardial cyst is seen (5.9 cm) and appears well circumscribed, in the left ventricle, without sepation.     Systemic Veins:  The inferior vena cava is normal in size measuring 1.25 cm in diameter, (normal <2.1cm) with normal inspiratory collapse (normal >50%) consistent with normal right atrial pressure (~3, range 0-5mmHg).    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 Since, she has required assistive devices to ambulate along with 1 person assist. In 2021, she was diagnosed with large coronary artery aneurysms as part of a workup for chest tightness. Cardiac CT showed large aneurysmal dilatation located anterior and superior to LV with calcification/thrombus within wall and compressing LA as well as proximal dilatation of RCA. TTE showed normal biventricular function. LHC showed large saccular aneurysm of proximal LM with LAD and LCx coming off confluence and medium localized aneurysm of RCA. Per CT surgery, determined to not be a CABG or transplant candidate. 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     On 3/27 she was at a doctor's appointment and synopsized in the office. When EMS arrived she had no pulse and CPR was initiated, ROSC after 20-25minutes. Unclear downtime prior to initiation of CPR but EMS arrived 20minutes after they were called. Total downtime could be up to 40-45minutes. She was defibrillated for Vfib. Stabilized at [facility] and brought to [facility]. She had a [facility] on admission, no intervention performed.     NEURO  -Baseline: alert and oriented x3, wheelchair bound  - < from: CT Head No Cont (03.28.25 @ 09:18) >  IMPRESSION: No evidence of an acute transcortical infarction or   hemorrhage.    #diffuse cerebral anoxic injury v seizures   -Propofol gtt, turn off 3/28 10 AM  - Neuro recs appreciated  - repeat CTH 3/30 wo contrast for acute change in neurologic status  - neuron specific enolase 24-48 hours and 72 hours post arrest AM labs: 3/29, 3/31  - Video EEG  - Delium w/u: Vit. b 12, folate, B6, thiamine, ETOH level, troponin level, LFT, TSH, Urine toxicology, ammonia  - HoId MRI brain wo contrast 5-7 days post arrest (04/1/25 - 04/03/25)  - general Neuro check q4h  - EEG, Frequent bifrontal seizures and lateralized rhythmic delta activity (LRDA).  Discussed with neurology team.  - right facial clonic myogenic/movement artifacts near 1-1.5hz, (face not visible on video).  with intermittently more prominent superimposed rapid eye motion artifacts. (from pontine lesion also a consideration)  - Ativan 4mg IVP x 1, Vimpat 200mg IVP x 1  - maintenance dosing of vimpat to 300 mg BID   - confirm VA interval < 200 with EKG ->         PULM  #Respiratory failure due to cardiac arrest  -AC 16/450/100/6  -Check CXR daily  *  ET tube at the level of the charli. Recommend repositioning.  *  [place]-Aileen catheter with tip at the level of the main pulmonary artery   segment.  *  Enteric tube with tip in the stomach.  *  Clear lungs.        CARDS  - Left ventricular systolic function is normal with an ejection fraction visually estimated at 55 to 60 %.  -  There is hypokinesis of the basal inferoseptal, basal inferolateral and basal inferior walls.  - There is mild (grade 1) left ventricular diastolic dysfunction, with normal left ventricular filling pressure.  - pbnp 1268   0530 HORACE CO 7.83 CI 4.55 CVP 5  MVO2 80%  Dc'd swan      #Vfib arrest  - Last [facility] 2021: mild CAD, aneurysm LM, LAD, CX, RCA  -Maintain amiodarone gtt   - trops 2500, ckmb 160 CKMB index 4  - SRIKANTH inf lead  - [facility] Diagnostic Conclusions:   Excessively ectatic left main coronary artery resulting in difficult  to opacify LAD and Cx coronaries. RCA with proximal segment  ectasia and slow coronary flow. Severe LV systolic dysfunction with at  least moderate-severe mitral regurgitation.  RA 10 mmHg.PA 31/19/24 mmHg. PCW 16 mmHg. Cardiac index 2.57  L/min/m2. SVR 1141 dsc.  - c/w  heparin gtt, blood pressure control  -  Appreciate CTS Dr Amando Aviles  - iso ischemia, coronary artery aneurysm surgery is indicated, appreciate the concerns regarding the patient's functional state.      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 2021 TTE: normal biventricular function, [facility] 2021. showed large saccular aneurysm of proximal LM with LAD and LCx coming off confluence and medium localized aneurysm of RCA. Per CT surgery, notCABG or transplant candidate. 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   presents to [facility] from routine visit to neurologist (Dr Ortiz) after syncopal event. When EMS arrived she had no pulse and CPR was initiated, ROSC after 20-25minutes. Unclear downtime prior to initiation of CPR but EMS arrived 20minutes after they were called. Total downtime could be up to 40-45minutes. She was defibrillated for Vfib. Stabilized at [facility] and brought to [facility]. She had a [facility] on admission, no intervention performed.       transferred to CCU after LHC without intervention (no intervention). With swan-aileen 0530 HORACE CO 7.83 CI 4.55 CVP 5  MVO2 80%      INTERVAL HISTORY/OVERNIGHT EVENTS: coughing overnight when suctioned, pupils pinpoint, fixed  450/14/60/6 decreased to RR 14 for resp alkalosis  Heparin gtt      TELEMETRY: SR one episode NSVT 4 beats    EKG: SR, borderline repol, std V3,4,5      transferred to CCU for    INTERVAL HISTORY/OVERNIGHT EVENTS:     TELEMETRY:     EKG:       ALLERGIES: Allergies    No Known Allergies    Intolerances        MEDICATIONS:  MEDICATIONS  (STANDING):  chlorhexidine 0.12% Liquid 15 milliLiter(s) Oral Mucosa every 12 hours  chlorhexidine 2% Cloths 1 Application(s) Topical <User Schedule>  heparin  Infusion 800 Unit(s)/Hr (9 mL/Hr) IV Continuous <Continuous>  lacosamide 300 milliGRAM(s) Oral two times a day  pantoprazole  Injectable 40 milliGRAM(s) IV Push daily  piperacillin/tazobactam IVPB.. 3.375 Gram(s) IV Intermittent every 12 hours  potassium chloride   Powder 40 milliEquivalent(s) Oral once    MEDICATIONS  (PRN):        OBJECTIVE:  ICU Vital Signs Last 24 Hrs  T(C): 37.7 (28 Mar 2025 16:00), Max: 37.8 (28 Mar 2025 08:00)  T(F): 99.9 (28 Mar 2025 16:00), Max: 100 (28 Mar 2025 08:00)  HR: 91 (29 Mar 2025 03:47) (71 - 93)  BP: --  BP(mean): --  ABP: 122/77 (29 Mar 2025 03:00) (85/60 - 137/89)  ABP(mean): 93 (29 Mar 2025 03:00) (36 - 107)  RR: 18 (29 Mar 2025 03:00) (16 - 18)  SpO2: 100% (29 Mar 2025 03:47) (100% - 100%)    O2 Parameters below as of 28 Mar 2025 08:00  Patient On (Oxygen Delivery Method): ventilator          Mode: AC/ CMV (Assist Control/ Continuous Mandatory Ventilation)  RR (machine): 18  TV (machine): 450  FiO2: 60  PEEP: 6  ITime: 0.8  MAP: 10  PIP: 20    I&O's Summary    27 Mar 2025 07:01  -  28 Mar 2025 07:00  --------------------------------------------------------  IN: 679.7 mL / OUT: 1360 mL / NET: -680.3 mL    28 Mar 2025 07:01  -  29 Mar 2025 05:40  --------------------------------------------------------  IN: 440 mL / OUT: 1000 mL / NET: -560 mL        PHYSICAL EXAMINATION:  General: Comfortable, no acute distress, cooperative with exam.  HEENT: Moist mucous membranes.  Respiratory: CTAB, normal respiratory effort, no coughing, wheezes, crackles, or rales.  CV: RRR, S1S2, no murmurs, rubs or gallops. No JVD. Distal pulses intact.  Abdominal: Soft, nontender, nondistended, no rebound or guarding, normal bowel sounds.  Neurology: AOx3, no focal neuro defects, DARDEN x 4.  Extremities: No pitting edema, + Peripheral pulses.          LABS:  ABG - ( 29 Mar 2025 04:20 )  pH, Arterial: 7.55  pH, Blood: x     /  pCO2: 24    /  pO2: 189   / HCO3: 21    / Base Excess: -0.4  /  SaO2: 99.6                                    9.8    21.13 )-----------( 133      ( 29 Mar 2025 04:20 )             29.2     03-29    133[L]  |  101  |  34[H]  ----------------------------<  131[H]  3.5   |  19[L]  |  1.89[H]    Ca    7.8[L]      29 Mar 2025 04:20  Phos  2.9     03-29  Mg     2.20     03-29    TPro  6.1  /  Alb  3.2[L]  /  TBili  0.6  /  DBili  x   /  AST  397[H]  /  ALT  497[H]  /  AlkPhos  64  03-29    LIVER FUNCTIONS - ( 29 Mar 2025 04:20 )  Alb: 3.2 g/dL / Pro: 6.1 g/dL / ALK PHOS: 64 U/L / ALT: 497 U/L / AST: 397 U/L / GGT: x           PT/INR - ( 27 Mar 2025 21:52 )   PT: 12.6 sec;   INR: 1.09 ratio         PTT - ( 29 Mar 2025 04:20 )  PTT:48.8 sec  CKMB Units: 78.6 ng/mL (03-29 @ 04:20)    CARDIAC MARKERS ( 29 Mar 2025 04:20 )  x     / x     / x     / x     / 78.6 ng/mL  CARDIAC MARKERS ( 27 Mar 2025 21:52 )  x     / x     / x     / x     / 162.5 ng/mL      Urinalysis Basic - ( 29 Mar 2025 04:20 )    Color: x / Appearance: x / SG: x / pH: x  Gluc: 131 mg/dL / Ketone: x  / Bili: x / Urobili: x   Blood: x / Protein: x / Nitrite: x   Leuk Esterase: x / RBC: x / WBC x   Sq Epi: x / Non Sq Epi: x / Bacteria: x    < from: TTE W or WO Ultrasound Enhancing Agent (03.28.25 @ 07:36) >  CONCLUSIONS:      1. Left ventricular cavity is normal in size. Left ventricular systolic function is normal with an ejection fraction visually estimated at 55 to 60 %.   2. There is hypokinesis of the basal inferoseptal, basal inferolateral and basal inferior walls.   3. There is mild (grade 1) left ventricular diastolic dysfunction, with normal left ventricular filling pressure.   4. Normal left and right atrial size.   5. No significant valvular disease.   6. Estimated pulmonary artery systolic pressure is 18 mmHg.   7. Large well circumscribed echolucent mass seen adjacent to the left heart in the subcostal view, finding likely consistent with known large coronary aneurysm.   8. No pericardial effusion seen.   9. The inferior vena cava is normal in size measuring 1.25 cm in diameter, (normal <2.1cm) with normal inspiratory collapse (normal >50%) consistent with normal right atrial pressure (~3, range 0-5mmHg).    ________________________________________________________________________________________  FINDINGS:     Left Ventricle:  Complications from contrast: no adverse reaction. The left ventricular cavity is normal in size. Left ventricular wall thickness is mildly increased. Left ventricular systolic function is normal with an ejection fraction visually estimated at 55 to 60%. Left ventricularregional wall motion assessment reveals hypokinesis of the basal inferoseptal, basal inferolateral and basal inferior walls. There is normal LV mass and concentric remodeling. There is mild (grade 1) left ventricular diastolic dysfunction, with normal left ventricular filling pressure.     Right Ventricle:  The right ventricular cavity is normal in size and right ventricular systolic function is mildly reduced. Tricuspid annular plane systolic excursion (TAPSE) is 1.6 cm (normal >=1.7 cm).     Left Atrium:  The left atrium is normal in size with an indexed volume of 16.91 ml/m².     Right Atrium:  The right atrium is normal in size with an indexed volume of 15.62 ml/m² and an indexed area of 7.00 cm²/m².     Interatrial Septum:  The interatrial septum appears intact.     Aortic Valve:  The aortic valve appears trileaflet with normal systolic excursion. There is no aortic valve stenosis.     Mitral Valve:  Structurally normal mitral valve with normal leaflet excursion. There is normal leaflet mobility of the mitral valve. There is no mitral valve stenosis. There is mild mitral regurgitation. The mitral regurgitant jet is eccentrically directed.     Tricuspid Valve:  Structurally normal tricuspid valve with normal leaflet excursion. There is trace tricuspid regurgitation. Estimated pulmonary artery systolic pressure is 18 mmHg.     Pulmonic Valve:  Structurally normal pulmonic valve with normal leaflet excursion. There is no pulmonic valve stenosis. There is trace pulmonic regurgitation.     Aorta:  The aortic root at the sinuses of Valsalva is normal in size, measuring 3.10 cm (indexed 1.81 cm/m²). The ascending aorta is normal in size, measuring 2.70 cm (indexed 1.57 cm/m²).     Pericardium:  No pericardial effusion seen. Pericardial cyst is seen (5.9 cm) and appears well circumscribed, in the left ventricle, without sepation.     Systemic Veins:  The inferior vena cava is normal in size measuring 1.25 cm in diameter, (normal <2.1cm) with normal inspiratory collapse (normal >50%) consistent with normal right atrial pressure (~3, range 0-5mmHg).    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 Since, she has required assistive devices to ambulate along with 1 person assist. In 2021, she was diagnosed with large coronary artery aneurysms as part of a workup for chest tightness. Cardiac CT showed large aneurysmal dilatation located anterior and superior to LV with calcification/thrombus within wall and compressing LA as well as proximal dilatation of RCA. TTE showed normal biventricular function. LHC showed large saccular aneurysm of proximal LM with LAD and LCx coming off confluence and medium localized aneurysm of RCA. Per CT surgery, determined to not be a CABG or transplant candidate. 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     On 3/27 she was at a doctor's appointment and synopsized in the office. When EMS arrived she had no pulse and CPR was initiated, ROSC after 20-25minutes. Unclear downtime prior to initiation of CPR but EMS arrived 20minutes after they were called. Total downtime could be up to 40-45minutes. She was defibrillated for Vfib. Stabilized at [facility] and brought to [facility]. She had a [facility] on admission, no intervention performed.     NEURO  -Baseline: alert and oriented x3, wheelchair bound  - < from: CT Head No Cont (03.28.25 @ 09:18) >  IMPRESSION: No evidence of an acute transcortical infarction or   hemorrhage.    #diffuse cerebral anoxic injury v seizures   -Propofol gtt, turn off 3/28 10 AM  - Neuro recs appreciated  - repeat CTH 3/30 wo contrast for acute change in neurologic status  - neuron specific enolase 24-48 hours and 72 hours post arrest AM labs: 3/29, 3/31  - Video EEG  - Venuemob w/u: Vit. b 12, folate, B6, thiamine, ETOH level, troponin level, LFT, TSH, Urine toxicology, ammonia  - [facility] MRI brain wo contrast 5-7 days post arrest (04/1/25 - 04/03/25)  - general Neuro check q4h  - EEG, Frequent bifrontal seizures and lateralized rhythmic delta activity (LRDA).  Discussed with neurology team.  - right facial clonic myogenic/movement artifacts near 1-1.5hz, (face not visible on video).  with intermittently more prominent superimposed rapid eye motion artifacts. (from pontine lesion also a consideration)  - Ativan 4mg IVP x 1, Vimpat 200mg IVP x 1  - maintenance dosing of vimpat to 300 mg BID   - confirm WA interval < 200 with EKG ->         PULM  #Respiratory failure due to cardiac arrest  -AC 16/450/100/6  -Check CXR daily  *  ET tube at the level of the charli. Recommend repositioning.  *  [place]-Aileen catheter with tip at the level of the main pulmonary artery   segment.  *  Enteric tube with tip in the stomach.  *  Clear lungs.        CARDS  - Left ventricular systolic function is normal with an ejection fraction visually estimated at 55 to 60 %.  -  There is hypokinesis of the basal inferoseptal, basal inferolateral and basal inferior walls.  - There is mild (grade 1) left ventricular diastolic dysfunction, with normal left ventricular filling pressure.  - pbnp 1268   0530 HORACE CO 7.83 CI 4.55 CVP 5  MVO2 80%  Dc'd swan      #Vfib arrest  - Last [facility] 2021: mild CAD, aneurysm LM, LAD, CX, RCA  -Maintain amiodarone gtt   - trops 2500, ckmb 160 CKMB index 4  - SRIKANTH inf lead  - [facility] Diagnostic Conclusions:   Excessively ectatic left main coronary artery resulting in difficult  to opacify LAD and Cx coronaries. RCA with proximal segment  ectasia and slow coronary flow. Severe LV systolic dysfunction with at  least moderate-severe mitral regurgitation.  RA 10 mmHg.PA 31/19/24 mmHg. PCW 16 mmHg. Cardiac index 2.57  L/min/m2. SVR 1141 dsc.  - c/w  heparin gtt, blood pressure control  -  Appreciate CTS Dr Amando Aviles  - iso ischemia, coronary artery aneurysm surgery is indicated, appreciate the concerns regarding the patient's functional state.      GI  #OGT in place  -  tube feeds, jevity  - nutrition consult      TISH   - creatinine 1.14 in 2021  - now elevated 1.57 iso cardiac arrest    ENDO  - HgA1c 5.3  - TSH 0.73      ID  hyperthermic, leukocytosis  Empiric Zosyn started      HEME  #History of coronary thrombus   -Start heparin gtt  -Outpt: Eliquis/ASA    Ethics: Full code           CCU Service  Cardiology   Spect: [id number] ([facility])     PATIENT: LIZZIE ESPINOZA, MRN: [id number]    46 y/o wheelchair bound female with PMHx of HTN, CVAx2 (2015) s/p trach and PEG (reversed)c with residual right sided weakness, large coronary artery aneurysms (2021) anterior and superior to LV with calcification/thrombus within wall compressing LA as well as proximal dilatation of RCA. 2021 TTE: normal biventricular function, [facility] 2021. showed large saccular aneurysm of proximal LM with LAD and LCx coming off confluence and medium localized aneurysm of RCA. Per CT surgery, notCABG or transplant candidate. 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   presents to [facility] from routine visit to neurologist (Dr Ortiz) after syncopal event. When EMS arrived she had no pulse and CPR was initiated, ROSC after 20-25minutes. Unclear downtime prior to initiation of CPR but EMS arrived 20minutes after they were called. Total downtime could be up to 40-45minutes. She was defibrillated for Vfib. Stabilized at [facility] and brought to [facility]. She had a LHC on admission, no intervention performed.       transferred to CCU after C without intervention (no intervention). With swan-aileen 0530 HORACE CO 7.83 CI 4.55 CVP 5  MVO2 80%      INTERVAL HISTORY/OVERNIGHT EVENTS: coughing overnight when suctioned, pupils pinpoint, fixed  450/14/60/6 decreased to RR 14 for resp alkalosis  Heparin gtt      TELEMETRY: SR one episode NSVT 4 beats    EKG: SR, borderline repol, std V3,4,5      ALLERGIES: Allergies    No Known Allergies    Intolerances        MEDICATIONS:  MEDICATIONS  (STANDING):  chlorhexidine 0.12% Liquid 15 milliLiter(s) Oral Mucosa every 12 hours  chlorhexidine 2% Cloths 1 Application(s) Topical <User Schedule>  heparin  Infusion 800 Unit(s)/Hr (9 mL/Hr) IV Continuous <Continuous>  lacosamide 300 milliGRAM(s) Oral two times a day  pantoprazole  Injectable 40 milliGRAM(s) IV Push daily  piperacillin/tazobactam IVPB.. 3.375 Gram(s) IV Intermittent every 12 hours  potassium chloride   Powder 40 milliEquivalent(s) Oral once    MEDICATIONS  (PRN):        OBJECTIVE:  ICU Vital Signs Last 24 Hrs  T(C): 37.7 (28 Mar 2025 16:00), Max: 37.8 (28 Mar 2025 08:00)  T(F): 99.9 (28 Mar 2025 16:00), Max: 100 (28 Mar 2025 08:00)  HR: 91 (29 Mar 2025 03:47) (71 - 93)  BP: --  BP(mean): --  ABP: 122/77 (29 Mar 2025 03:00) (85/60 - 137/89)  ABP(mean): 93 (29 Mar 2025 03:00) (36 - 107)  RR: 18 (29 Mar 2025 03:00) (16 - 18)  SpO2: 100% (29 Mar 2025 03:47) (100% - 100%)    O2 Parameters below as of 28 Mar 2025 08:00  Patient On (Oxygen Delivery Method): ventilator          Mode: AC/ CMV (Assist Control/ Continuous Mandatory Ventilation)  RR (machine): 18  TV (machine): 450  FiO2: 60  PEEP: 6  ITime: 0.8  MAP: 10  PIP: 20    I&O's Summary    27 Mar 2025 07:01  -  28 Mar 2025 07:00  --------------------------------------------------------  IN: 679.7 mL / OUT: 1360 mL / NET: -680.3 mL    28 Mar 2025 07:01  -  29 Mar 2025 05:40  --------------------------------------------------------  IN: 440 mL / OUT: 1000 mL / NET: -560 mL        PHYSICAL EXAMINATION:  General: Comfortable, no acute distress, cooperative with exam.  HEENT: Moist mucous membranes.  Respiratory: CTAB, normal respiratory effort, no coughing, wheezes, crackles, or rales.  CV: RRR, S1S2, no murmurs, rubs or gallops. No JVD. Distal pulses intact.  Abdominal: Soft, nontender, nondistended, no rebound or guarding, normal bowel sounds.  Neurology: AOx3, no focal neuro defects, DARDEN x 4.  Extremities: No pitting edema, + Peripheral pulses.          LABS:  ABG - ( 29 Mar 2025 04:20 )  pH, Arterial: 7.55  pH, Blood: x     /  pCO2: 24    /  pO2: 189   / HCO3: 21    / Base Excess: -0.4  /  SaO2: 99.6                                    9.8    21.13 )-----------( 133      ( 29 Mar 2025 04:20 )             29.2     03-29    133[L]  |  101  |  34[H]  ----------------------------<  131[H]  3.5   |  19[L]  |  1.89[H]    Ca    7.8[L]      29 Mar 2025 04:20  Phos  2.9     03-29  Mg     2.20     03-29    TPro  6.1  /  Alb  3.2[L]  /  TBili  0.6  /  DBili  x   /  AST  397[H]  /  ALT  497[H]  /  AlkPhos  64  03-29    LIVER FUNCTIONS - ( 29 Mar 2025 04:20 )  Alb: 3.2 g/dL / Pro: 6.1 g/dL / ALK PHOS: 64 U/L / ALT: 497 U/L / AST: 397 U/L / GGT: x           PT/INR - ( 27 Mar 2025 21:52 )   PT: 12.6 sec;   INR: 1.09 ratio         PTT - ( 29 Mar 2025 04:20 )  PTT:48.8 sec  CKMB Units: 78.6 ng/mL (03-29 @ 04:20)    CARDIAC MARKERS ( 29 Mar 2025 04:20 )  x     / x     / x     / x     / 78.6 ng/mL  CARDIAC MARKERS ( 27 Mar 2025 21:52 )  x     / x     / x     / x     / 162.5 ng/mL      Urinalysis Basic - ( 29 Mar 2025 04:20 )    Color: x / Appearance: x / SG: x / pH: x  Gluc: 131 mg/dL / Ketone: x  / Bili: x / Urobili: x   Blood: x / Protein: x / Nitrite: x   Leuk Esterase: x / RBC: x / WBC x   Sq Epi: x / Non Sq Epi: x / Bacteria: x    < from: TTE W or WO Ultrasound Enhancing Agent (03.28.25 @ 07:36) >  CONCLUSIONS:      1. Left ventricular cavity is normal in size. Left ventricular systolic function is normal with an ejection fraction visually estimated at 55 to 60 %.   2. There is hypokinesis of the basal inferoseptal, basal inferolateral and basal inferior walls.   3. There is mild (grade 1) left ventricular diastolic dysfunction, with normal left ventricular filling pressure.   4. Normal left and right atrial size.   5. No significant valvular disease.   6. Estimated pulmonary artery systolic pressure is 18 mmHg.   7. Large well circumscribed echolucent mass seen adjacent to the left heart in the subcostal view, finding likely consistent with known large coronary aneurysm.   8. No pericardial effusion seen.   9. The inferior vena cava is normal in size measuring 1.25 cm in diameter, (normal <2.1cm) with normal inspiratory collapse (normal >50%) consistent with normal right atrial pressure (~3, range 0-5mmHg).    ________________________________________________________________________________________  FINDINGS:     Left Ventricle:  Complications from contrast: no adverse reaction. The left ventricular cavity is normal in size. Left ventricular wall thickness is mildly increased. Left ventricular systolic function is normal with an ejection fraction visually estimated at 55 to 60%. Left ventricularregional wall motion assessment reveals hypokinesis of the basal inferoseptal, basal inferolateral and basal inferior walls. There is normal LV mass and concentric remodeling. There is mild (grade 1) left ventricular diastolic dysfunction, with normal left ventricular filling pressure.     Right Ventricle:  The right ventricular cavity is normal in size and right ventricular systolic function is mildly reduced. Tricuspid annular plane systolic excursion (TAPSE) is 1.6 cm (normal >=1.7 cm).     Left Atrium:  The left atrium is normal in size with an indexed volume of 16.91 ml/m².     Right Atrium:  The right atrium is normal in size with an indexed volume of 15.62 ml/m² and an indexed area of 7.00 cm²/m².     Interatrial Septum:  The interatrial septum appears intact.     Aortic Valve:  The aortic valve appears trileaflet with normal systolic excursion. There is no aortic valve stenosis.     Mitral Valve:  Structurally normal mitral valve with normal leaflet excursion. There is normal leaflet mobility of the mitral valve. There is no mitral valve stenosis. There is mild mitral regurgitation. The mitral regurgitant jet is eccentrically directed.     Tricuspid Valve:  Structurally normal tricuspid valve with normal leaflet excursion. There is trace tricuspid regurgitation. Estimated pulmonary artery systolic pressure is 18 mmHg.     Pulmonic Valve:  Structurally normal pulmonic valve with normal leaflet excursion. There is no pulmonic valve stenosis. There is trace pulmonic regurgitation.     Aorta:  The aortic root at the sinuses of Valsalva is normal in size, measuring 3.10 cm (indexed 1.81 cm/m²). The ascending aorta is normal in size, measuring 2.70 cm (indexed 1.57 cm/m²).     Pericardium:  No pericardial effusion seen. Pericardial cyst is seen (5.9 cm) and appears well circumscribed, in the left ventricle, without sepation.     Systemic Veins:  The inferior vena cava is normal in size measuring 1.25 cm in diameter, (normal <2.1cm) with normal inspiratory collapse (normal >50%) consistent with normal right atrial pressure (~3, range 0-5mmHg).    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 Since, she has required assistive devices to ambulate along with 1 person assist. In 2021, she was diagnosed with large coronary artery aneurysms as part of a workup for chest tightness. Cardiac CT showed large aneurysmal dilatation located anterior and superior to LV with calcification/thrombus within wall and compressing LA as well as proximal dilatation of RCA. TTE showed normal biventricular function. LHC showed large saccular aneurysm of proximal LM with LAD and LCx coming off confluence and medium localized aneurysm of RCA. Per CT surgery, determined to not be a CABG or transplant candidate. 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     On 3/27 she was at a doctor's appointment and synopsized in the office. When EMS arrived she had no pulse and CPR was initiated, ROSC after 20-25minutes. Unclear downtime prior to initiation of CPR but EMS arrived 20minutes after they were called. Total downtime could be up to 40-45minutes. She was defibrillated for Vfib. Stabilized at [facility] and brought to [facility]. She had a [facility] on admission, no intervention performed.     NEURO  -Baseline: alert and oriented x3, wheelchair bound  - < from: CT Head No Cont (03.28.25 @ 09:18) >  IMPRESSION: No evidence of an acute transcortical infarction or   hemorrhage.    #diffuse cerebral anoxic injury v seizures   -Propofol gtt, turn off 3/28 10 AM  - Neuro recs appreciated  - repeat CTH 3/30 wo contrast for acute change in neurologic status  - neuron specific enolase 24-48 hours and 72 hours post arrest AM labs: 3/29, 3/31  - Video EEG  - [employer] w/u: Vit. b 12, folate, B6, thiamine, ETOH level, troponin level, LFT, TSH, Urine toxicology, ammonia  - [facility] MRI brain wo contrast 5-7 days post arrest (04/1/25 - 04/03/25)  - general Neuro check q4h  - EEG, Frequent bifrontal seizures and lateralized rhythmic delta activity (LRDA).  Discussed with neurology team.  - right facial clonic myogenic/movement artifacts near 1-1.5hz, (face not visible on video).  with intermittently more prominent superimposed rapid eye motion artifacts. (from pontine lesion also a consideration)  - Ativan 4mg IVP x 1, Vimpat 200mg IVP x 1  - maintenance dosing of vimpat to 300 mg BID   - confirm WA interval < 200 with EKG ->         PULM  #Respiratory failure due to cardiac arrest  -AC 16/450/100/6  -Check CXR daily  *  ET tube at the level of the charli. Recommend repositioning.  *  Greene-Aileen catheter with tip at the level of the main pulmonary artery   segment.  *  Enteric tube with tip in the stomach.  *  Clear lungs.        CARDS  - Left ventricular systolic function is normal with an ejection fraction visually estimated at 55 to 60 %.  -  There is hypokinesis of the basal inferoseptal, basal inferolateral and basal inferior walls.  - There is mild (grade 1) left ventricular diastolic dysfunction, with normal left ventricular filling pressure.  - pbnp 1268   0530 HORACE CO 7.83 CI 4.55 CVP 5  MVO2 80%  Dc'd swan      #Vfib arrest  - Last [facility] 2021: mild CAD, aneurysm LM, LAD, CX, RCA  -Maintain amiodarone gtt   - trops 2500, ckmb 160 CKMB index 4  - SRIKANTH inf lead  - [facility] Diagnostic Conclusions:   Excessively ectatic left main coronary artery resulting in difficult  to opacify LAD and Cx coronaries. RCA with proximal segment  ectasia and slow coronary flow. Severe LV systolic dysfunction with at  least moderate-severe mitral regurgitation.  RA 10 mmHg.PA 31/19/24 mmHg. PCW 16 mmHg. Cardiac index 2.57  L/min/m2. SVR 1141 dsc.  - c/w  heparin gtt, blood pressure control  -  Appreciate CTS Dr Amando Aviles  - iso ischemia, coronary artery aneurysm surgery is indicated, appreciate the concerns regarding the patient's functional state.      GI  #OGT in place  -  tube feeds, jevity  - nutrition consult      TISH   - creatinine 1.14 in 2021  - now elevated 1.57 iso cardiac arrest    ENDO  - HgA1c 5.3  - TSH 0.73      ID  hyperthermic, leukocytosis  Empiric Zosyn started      HEME  #History of coronary thrombus   -Start heparin gtt  -Outpt: Eliquis/ASA    Ethics: Full code

## 2025-03-29 NOTE — DIETITIAN INITIAL EVALUATION ADULT - ORAL INTAKE PTA/DIET HISTORY
Patient seen for assessment. Unable to obtain information from patient 2/2 intubation. Information obtained from ACP and chart review.

## 2025-03-29 NOTE — DIETITIAN INITIAL EVALUATION ADULT - ENTERAL
Jevity 1.5 @ 45 mL/hr. x 24 hrs. for total volume 1,080 mL. Provides 1,620 kcal (29 kcal/kg), 69 g pro (1.2 g/kg) based on IBW 56.6 kg and 821 mL of fluid (TF free water), defer additional flushes to team. Begin tube feeds @ 15 mL/hr. and increase by 10 mL/hr. every 4-6 hrs. until goal rate.

## 2025-03-29 NOTE — DISCHARGE NOTE PROVIDER - EXTENDED VTE YES NO FOR MLM ENOXAPARIN
The following medication was given:     MEDICATION:  Lidocaine with epinephrine 1% [id number]  ROUTE: SQ  SITE: see procedure note  DOSE: 2cc  LOT #: [id number]  : [employer]  EXPIRATION DATE: 02-28-25  NDC#: 49790-747-30  Was there drug waste? No  Multi-dose vial: Yes    Cassia Low RN  December 7, 2023      ,

## 2025-03-30 NOTE — PROGRESS NOTE ADULT - SUBJECTIVE AND OBJECTIVE BOX
Neurology Progress Note    INTERVAL HISTORY:  No acute events  EEG unchanged, myogenic artefact from jaw movements/myoclonus. Attenuated background.  Pending repeat CTH after disconnected from EEG.  Exam is worse today showing extensor posturing with noxious.    REVIEW OF SYSTEMS: Unable to obtain due to mental status.    VITALS & EXAMINATION:  Vital Signs Last 24 Hrs  T(C): 37.9 (30 Mar 2025 08:00), Max: 38.1 (29 Mar 2025 23:15)  T(F): 100.2 (30 Mar 2025 08:00), Max: 100.6 (29 Mar 2025 23:15)  HR: 104 (30 Mar 2025 12:30) (89 - 114)  BP: --  BP(mean): --  RR: 17 (30 Mar 2025 12:30) (14 - 17)  SpO2: 94% (30 Mar 2025 12:30) (94% - 100%)    Parameters below as of 30 Mar 2025 08:00  Patient On (Oxygen Delivery Method): ventilator        General:  Constitutional: Female, appears stated age, nontoxic, not in distress,    Head: Normocephalic;   Eyes: clear sclera;   Extremities: No cyanosis;   Resp: breathing comfortably  Neck: no nuchal rigidity       Neurological (>12):  No sedation  Eyes closed, no response to voice, tactile, or noxious  PERRL sluggishly  No longer seeing vertical pendular nystagmus  eyes are fixed in ortho  +Corneals  equivocal OCR  +cough to suction  ongoing subtle myoclonus in lower face  Decerebrate posturing to noxiuos in arms  +Tripple flexion in legs to noxious      LABORATORY:  CBC                       9.0    18.82 )-----------( 144      ( 30 Mar 2025 02:19 )             27.9     Chem 03-30    134[L]  |  102  |  28[H]  ----------------------------<  125[H]  3.8   |  19[L]  |  1.61[H]    Ca    7.9[L]      30 Mar 2025 02:19  Phos  2.7     03-30  Mg     2.20     03-30    TPro  6.1  /  Alb  3.1[L]  /  TBili  0.4  /  DBili  x   /  AST  222[H]  /  ALT  322[H]  /  AlkPhos  61  03-30    LFTs LIVER FUNCTIONS - ( 30 Mar 2025 02:19 )  Alb: 3.1 g/dL / Pro: 6.1 g/dL / ALK PHOS: 61 U/L / ALT: 322 U/L / AST: 222 U/L / GGT: x           Coagulopathy PTT - ( 30 Mar 2025 02:19 )  PTT:68.0 sec  Lipid Panel 03-29 Chol 80 LDL -- HDL 46[L] Trig 59  A1c   Cardiac enzymes CARDIAC MARKERS ( 30 Mar 2025 02:19 )  x     / x     / x     / x     / 21.2 ng/mL  CARDIAC MARKERS ( 29 Mar 2025 04:20 )  x     / x     / x     / x     / 78.6 ng/mL          STUDIES & IMAGING: (EEG, CT, MR, U/S, TTE/SANDRO):  CT Head No Cont:  (28 Mar 2025 09:18)      FINDINGS:  Right pontine hypodensity on image 14 of series 10 in the patient's known   area of cavernoma versus encephalomalacia.  No acute intracranial hemorrhage. There is no compelling evidence for an   acute transcortical infarction. There is no evidence of mass, mass   effect, midline shift or extra-axial fluid collection. The lateral   ventricles and cortical sulci are age-appropriate in size and   configuration. The orbits, mastoid air cells and visualized paranasal   sinuses are unremarkable. The calvarium is intact. Consider MRI as   clinically warranted.    IMPRESSION: No evidence of an acute transcortical infarction or   hemorrhage.      EEG 3/29 17h:  EEG Classification / Summary:  Abnormal EEG study  In the context of substantial movement artifacts and poor video:  Right facial / jaw clonus near 1-1.5hz unremitting.  Difficult to discern any spike component on EEG.  Associated movement artifacts superimposed on myogenic artifacts at times with stimulation/arousal.  In addition, substantial eye motion artifacts, with nystagmus noted on bedside exam per resident.    Suspect diffuse attenuation, some higher amplitude activity may  be present over the left frontal region.    -----------------------------------------------------------------------------------------------------    Clinical Impression:  Heavily artifactual recording.  Suspect severe diffuse/multi-focal cerebral dysfunction, worse in the right hemisphere.  Right sided clonic jaw/facial movements may represent EPC/focal motor seizure activity, with limited EEG.  Associated nystagmus clinically, with associated quasi-rhythmic artifacts on EEG.   Clinical correlation required in the context of prior pontine lesion, known nystagmus/gaze abnormalities, and recent anoxia.

## 2025-03-30 NOTE — PROGRESS NOTE ADULT - ASSESSMENT
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 Total downtime could be up to 40-45minutes. 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 Otherwise comatose, off sedation. Prognosis is likely poor given worsened exam, now with decerebrate posturing and triple flexion, as well as EEG background of attenuation.    Diag:  [] repeat CT head today  [] If CT not revealing unequivocal widespread anoxic brain injury, then plan for MRI brain wo contrast 5-7 days post arrest (04/1/25 - 04/03/25)  [] F/u NSE  [] disconnect vEEG  [] neurology available for family discussions, please call to schedule/plan

## 2025-03-30 NOTE — EEG REPORT - NS EEG TEXT BOX
LIZZIE ESPINOZA [id number]     Study Date: 3/29/25 08:00 - 3/30/25 10:41  Duration: 26 hr 39 min  --------------------------------------------------------------------------------------------------  History:  CC/ HPI Patient is a 45y old  Female who presents with a chief complaint of ST elevation myocardial infarction (STEMI)     (29 Mar 2025 08:58)    MEDICATIONS  (STANDING):  chlorhexidine 0.12% Liquid 15 milliLiter(s) Oral Mucosa every 12 hours  chlorhexidine 2% Cloths 1 Application(s) Topical <User Schedule>  heparin  Infusion 800 Unit(s)/Hr (10 mL/Hr) IV Continuous <Continuous>  hydrALAZINE 25 milliGRAM(s) Oral three times a day  isosorbide   dinitrate Tablet (ISORDIL) 10 milliGRAM(s) Oral three times a day  lacosamide Solution 300 milliGRAM(s) Oral two times a day  pantoprazole  Injectable 40 milliGRAM(s) IV Push daily  piperacillin/tazobactam IVPB.. 3.375 Gram(s) IV Intermittent every 12 hours    --------------------------------------------------------------------------------------------------  Study Interpretation:    [[[Abbreviation Key:  PDR=alpha rhythm/posterior dominant rhythm. A-P=anterior posterior.  Amplitude: ‘very low’:<20; ‘low’:20-49; ‘medium’:; ‘high’:>150uV.  Persistence for periodic/rhythmic patterns (% of epoch) ‘rare’:<1%; ‘occasional’:1-10%; ‘frequent’:10-50%; ‘abundant’:50-90%; ‘continuous’:>90%.  Persistence for sporadic discharges: ‘rare’:<1/hr; ‘occasional’:1/min-1/hr; ‘frequent’:>1/min; ‘abundant’:>1/10 sec.  RPP=rhythmic and periodic patterns; GRDA=generalized rhythmic delta activity; FIRDA=frontal intermittent GRDA; LRDA=lateralized rhythmic delta activity; TIRDA=temporal intermittent rhythmic delta activity;  LPD=PLED=lateralized periodic discharges; GPD=generalized periodic discharges; BIPDs =bilateral independent periodic discharges; Mf=multifocal; SIRPDs=stimulus induced rhythmic, periodic, or ictal appearing discharges; BIRDs=brief potentially ictal rhythmic discharges >4 Hz, lasting .5-10s; PFA (paroxysmal bursts >13 Hz or =8 Hz <10s).  Modifiers: +F=with fast component; +S=with spike component; +R=with rhythmic component.  S-B=burst suppression pattern.  Max=maximal. N1-drowsy; N2-stage II sleep; N3-slow wave sleep. SSS/BETS=small sharp spikes/benign epileptiform transients of sleep. HV=hyperventilation; PS=photic stimulation]]]    Daily EEG Visual Analysis    FINDINGS:      Background:  The background is symmetric and diffusely suppressed <10 uV between findings described below.    Background Slowing:  Generalized slowing: As above  Focal slowing: None    State Changes:   Absent.  Increase in movement and muscle artifact with stimulation.    Interictal Findings and Possible Seizures:  Abundant left frontal LRDA at 1-2 Hz with variable morphology and amplitude (at times sharply contoured), at times fluctuating to bluntly contoured sharp waves and/or periodic theta.  At times, the above fluctuates to sharply contoured GRDA or periodic sharply contoured delta waveforms at 1-1.5 Hz.  The above rhythmic activity suddenly ends at 22:40 3/29/25 (no sedating medication administered per review of EMR), and later recurs.  There is continuous right frontal periodic spiky muscle artifact time-locked to the above rhythmic/periodic activity. The same right frontal periodic artifact is also present during periods where LRDA and sharp waves disappear. No clear clinical correlate is visible on video.  Right frontal artifactual delta waveforms appear when left frontal LRDA increases in amplitude.    Other Clinical Events:  Per note in chart, there is an event of "whole body clonic myogenic/movement with high pressure on vent" at unclear time with lorazepam given. Per EMR, lorazepam given at 05:42 3/30/25.  On video, at 05:37 3/30/25, staff are with the patient. Patient appears to be coughing, with intermittent non-rhythmic truncal flexion and b/l hip and knee flexion. EEG shows left frontal LRDA before and after this time, decreasing in amplitude during coughing movements.    10:16 3/30/25: Coughing. Brief RUE jerk. EEG limited by muscle artifact, possible bilateral frontotemporal RDA.    Activation Procedures:   Hyperventilation is not performed.    Photic stimulation is not performed.    Artifacts:  Intermittent myogenic and movement artifacts are present, at times limiting interpretation.    Single-lead EKG: Regular rhythm    EEG Classification / Summary:  Abnormal EEG in a comatose patient.  -Abundant left frontal LRDA at 1-2 Hz with variable morphology and amplitude, at times fluctuating to bluntly contoured sharp waves and/or periodic theta, at times fluctuating to sharply contoured GRDA or periodic sharply contoured delta waveforms  -Continuous right frontal periodic spiky muscle artifact time-locked to the above rhythmic/periodic activity. This same artifact is also present during periods where rhythmic/periodic activity disappears. No clear clinical correlate is visible on video.  -Diffusely suppressed background. Interpretation somewhat limited by artifact  -Events suspected to be coughing, one with a brief RUE jerk, and no clear electrographic seizure but ongoing left frontal LRDA or bilateral frontotemporal RDA around these times    Clinical Impression:  -Rhythmic/periodic patterns mostly in the left frontal region, at times generalizing. Continuous right frontal periodic spiky muscle artifact indicating rhythmic movement which might include nystagmus (expected to be horizontal) or other rhythmic movement. No clear clinical correlate visible on video.  -Cannot exclude left frontal focal-onset seizures, at times generalizing  -Risk of left frontal focal-onset seizures  -Severe diffuse cerebral dysfunction, nonspecific in etiology.   -Artifacts somewhat limit interpretation  -Events at 05:37 and 10:16 3/30/25 are suspected to be coughing and less likely to be epileptic in etiology.        -------------------------------------------------------------------------------------------------------    Nithya Montanez MD  Attending Physician, [facility] [place]    -------------------------------------------------------------------------------------------------------    To reach EEG technologist:  Please use the pager number for the appropriate hospital or contact the .  At [facility] - Pager #: [phone]    To reach EEG-reading physician:  EEG Reading Room Phone #: [phone]  Epilepsy Answering Service after 5PM and before 8:30AM: Phone #: [phone]     LIZZIE ESPINOZA [id number]     Study Date: 3/29/25 08:00 - 3/30/25 10:41  Duration: 26 hr 39 min  --------------------------------------------------------------------------------------------------  History:  CC/ HPI Patient is a 45y old  Female who presents with a chief complaint of ST elevation myocardial infarction (STEMI)     (29 Mar 2025 08:58)    MEDICATIONS  (STANDING):  chlorhexidine 0.12% Liquid 15 milliLiter(s) Oral Mucosa every 12 hours  chlorhexidine 2% Cloths 1 Application(s) Topical <User Schedule>  heparin  Infusion 800 Unit(s)/Hr (10 mL/Hr) IV Continuous <Continuous>  hydrALAZINE 25 milliGRAM(s) Oral three times a day  isosorbide   dinitrate Tablet (ISORDIL) 10 milliGRAM(s) Oral three times a day  lacosamide Solution 300 milliGRAM(s) Oral two times a day  pantoprazole  Injectable 40 milliGRAM(s) IV Push daily  piperacillin/tazobactam IVPB.. 3.375 Gram(s) IV Intermittent every 12 hours    --------------------------------------------------------------------------------------------------  Study Interpretation:    [[[Abbreviation Key:  PDR=alpha rhythm/posterior dominant rhythm. A-P=anterior posterior.  Amplitude: ‘very low’:<20; ‘low’:20-49; ‘medium’:; ‘high’:>150uV.  Persistence for periodic/rhythmic patterns (% of epoch) ‘rare’:<1%; ‘occasional’:1-10%; ‘frequent’:10-50%; ‘abundant’:50-90%; ‘continuous’:>90%.  Persistence for sporadic discharges: ‘rare’:<1/hr; ‘occasional’:1/min-1/hr; ‘frequent’:>1/min; ‘abundant’:>1/10 sec.  RPP=rhythmic and periodic patterns; GRDA=generalized rhythmic delta activity; FIRDA=frontal intermittent GRDA; LRDA=lateralized rhythmic delta activity; TIRDA=temporal intermittent rhythmic delta activity;  LPD=PLED=lateralized periodic discharges; GPD=generalized periodic discharges; BIPDs =bilateral independent periodic discharges; Mf=multifocal; SIRPDs=stimulus induced rhythmic, periodic, or ictal appearing discharges; BIRDs=brief potentially ictal rhythmic discharges >4 Hz, lasting .5-10s; PFA (paroxysmal bursts >13 Hz or =8 Hz <10s).  Modifiers: +F=with fast component; +S=with spike component; +R=with rhythmic component.  S-B=burst suppression pattern.  Max=maximal. N1-drowsy; N2-stage II sleep; N3-slow wave sleep. SSS/BETS=small sharp spikes/benign epileptiform transients of sleep. HV=hyperventilation; PS=photic stimulation]]]    Daily EEG Visual Analysis    FINDINGS:      Background:  The background is symmetric and diffusely suppressed <10 uV between findings described below.    Background Slowing:  Generalized slowing: As above  Focal slowing: None    State Changes:   Absent.  Increase in movement and muscle artifact with stimulation.    Interictal Findings and Possible Seizures:  Abundant left frontal LRDA at 1-2 Hz with variable morphology and amplitude (at times sharply contoured), at times fluctuating to bluntly contoured sharp waves and/or periodic theta.  At times, the above fluctuates to sharply contoured GRDA or periodic sharply contoured delta waveforms at 1-1.5 Hz.  The above rhythmic activity suddenly ends at 22:40 3/29/25 (no sedating medication administered per review of EMR), and later recurs.  There is continuous right frontal periodic spiky muscle artifact time-locked to the above rhythmic/periodic activity. The same right frontal periodic artifact is also present during periods where LRDA and sharp waves disappear. No clear clinical correlate is visible on video.  Right frontal artifactual delta waveforms appear when left frontal LRDA increases in amplitude.    Other Clinical Events:  Per note in chart, there is an event of "whole body clonic myogenic/movement with high pressure on vent" at unclear time with lorazepam given. Per EMR, lorazepam given at 05:42 3/30/25.  On video, at 05:37 3/30/25, staff are with the patient. Patient appears to be coughing, with intermittent non-rhythmic truncal flexion and b/l hip and knee flexion. EEG shows left frontal LRDA before and after this time, decreasing in amplitude during coughing movements.    10:16 3/30/25: Coughing. Brief RUE jerk. EEG limited by muscle artifact, possible bilateral frontotemporal RDA.    Activation Procedures:   Hyperventilation is not performed.    Photic stimulation is not performed.    Artifacts:  Intermittent myogenic and movement artifacts are present, at times limiting interpretation.    Single-lead EKG: Regular rhythm    EEG Classification / Summary:  Abnormal EEG in a comatose patient.  -Abundant left frontal LRDA at 1-2 Hz with variable morphology and amplitude, at times fluctuating to bluntly contoured sharp waves and/or periodic theta, at times fluctuating to sharply contoured GRDA or periodic sharply contoured delta waveforms  -Continuous right frontal periodic spiky muscle artifact time-locked to the above rhythmic/periodic activity. This same artifact is also present during periods where rhythmic/periodic activity disappears. No clear clinical correlate is visible on video.  -Diffusely suppressed background. Interpretation somewhat limited by artifact  -Events suspected to be coughing, one with a brief RUE jerk, and no clear electrographic seizure but ongoing left frontal LRDA or bilateral frontotemporal RDA around these times    Clinical Impression:  -Abundant rhythmic/periodic patterns mostly in the left frontal region, at times generalizing. Continuous right frontal periodic spiky muscle artifact indicating rhythmic movement which might include nystagmus (expected to be horizontal) or other rhythmic movement. No clear clinical correlate visible on video. Of note, this muscle artifact is also present during periods when no rhythmic/periodic cerebral activity is present.  -Cannot exclude left frontal focal-onset seizures, at times generalizing  -Risk of left frontal focal-onset seizures  -Severe diffuse cerebral dysfunction, nonspecific in etiology.   -Artifacts somewhat limit interpretation  -Events at 05:37 and 10:16 3/30/25 are suspected to be coughing and less likely to be epileptic in etiology.        -------------------------------------------------------------------------------------------------------    Nithya Montanez MD  Attending Physician, [facility]ile[facility] [place]    -------------------------------------------------------------------------------------------------------    To reach EEG technologist:  Please use the pager number for the appropriate hospital or contact the .  At [facility] - Pager #: [phone]    To reach EEG-reading physician:  EEG Reading Room Phone #: [phone]  Epilepsy Answering Service after 5PM and before 8:30AM: Phone #: [phone]

## 2025-03-30 NOTE — PROGRESS NOTE ADULT - SUBJECTIVE AND OBJECTIVE BOX
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 2021 TTE: normal biventricular function, [facility] 2021. showed large saccular aneurysm of proximal LM with LAD and LCx coming off confluence and medium localized aneurysm of RCA. Per CT surgery, notCABG or transplant candidate. 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   presents to [facility] from routine visit to neurologist (Dr Ortiz) after syncopal event. When EMS arrived she had no pulse and CPR was initiated, ROSC after 20-25minutes. Unclear downtime prior to initiation of CPR but EMS arrived 20minutes after they were called. Total downtime could be up to 40-45minutes. She was defibrillated for Vfib. Stabilized at [facility] and brought to [facility]. She had a LHC on admission, no intervention performed.       transferred to CCU after LHC without intervention (no intervention). With swan-cande 0530 HORACE CO 7.83 CI 4.55 CVP 5  MVO2 80%      INTERVAL HISTORY/OVERNIGHT EVENTS: coughing overnight when suctioned, pupils pinpoint, fixed  450/14/60/6 decreased to RR 14 for resp alkalosis  Heparin gtt      TELEMETRY: SR one episode NSVT 4 beats    EKG: SR, borderline repol, std V3,4,89 y/o wheelchair bound female with PMHx of HTN, CVAx2 (2015) s/p trach and PEG (reversed)c with residual right sided weakness, large coronary artery aneurysms (2021) anterior and superior to LV with calcification/thrombus within wall compressing LA as well as proximal dilatation of RCA. 2021 TTE: normal biventricular function, [facility] 2021. showed large saccular aneurysm of proximal LM with LAD and LCx coming off confluence and medium localized aneurysm of RCA. Per CT surgery, notCABG or transplant candidate. 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   presents to [facility] from routine visit to neurologist (Dr Ortiz) after syncopal event. When EMS arrived she had no pulse and CPR was initiated, ROSC after 20-25minutes. Unclear downtime prior to initiation of CPR but EMS arrived 20minutes after they were called. Total downtime could be up to 40-45minutes. She was defibrillated for Vfib. Stabilized at [facility] and brought to [facility]. She had a LHC on admission, no intervention performed.       transferred to CCU after LHC without intervention (no intervention). With swan-cande 0530 HORACE CO 7.83 CI 4.55 CVP 5  MVO2 80%      INTERVAL HISTORY/OVERNIGHT EVENTS: coughing when suctioned, pupils reactive to light  450/14/60/6 decreased to RR 14 for resp alkalosis  Heparin gtt      TELEMETRY: SR    EKG: SR, borderline repol, std V3,4,5      ALLERGIES: Allergies    No Known Allergies    Intolerances        MEDICATIONS:  MEDICATIONS  (STANDING):  chlorhexidine 0.12% Liquid 15 milliLiter(s) Oral Mucosa every 12 hours  chlorhexidine 2% Cloths 1 Application(s) Topical <User Schedule>  heparin  Infusion 800 Unit(s)/Hr (10 mL/Hr) IV Continuous <Continuous>  hydrALAZINE 10 milliGRAM(s) Oral three times a day  isosorbide   dinitrate Tablet (ISORDIL) 10 milliGRAM(s) Oral three times a day  lacosamide Solution 300 milliGRAM(s) Oral two times a day  pantoprazole  Injectable 40 milliGRAM(s) IV Push daily  piperacillin/tazobactam IVPB.. 3.375 Gram(s) IV Intermittent every 12 hours    MEDICATIONS  (PRN):        OBJECTIVE:  ICU Vital Signs Last 24 Hrs  T(C): 37.9 (30 Mar 2025 00:00), Max: 38.6 (29 Mar 2025 12:00)  T(F): 100.2 (30 Mar 2025 00:00), Max: 101.5 (29 Mar 2025 12:00)  HR: 97 (30 Mar 2025 05:04) (89 - 107)  BP: --  BP(mean): --  ABP: 154/93 (30 Mar 2025 03:00) (117/76 - 156/95)  ABP(mean): 116 (30 Mar 2025 03:00) (89 - 116)  RR: 14 (30 Mar 2025 03:00) (14 - 17)  SpO2: 100% (30 Mar 2025 05:04) (100% - 100%)    O2 Parameters below as of 29 Mar 2025 08:00  Patient On (Oxygen Delivery Method): ventilator          Mode: AC/ CMV (Assist Control/ Continuous Mandatory Ventilation)  RR (machine): 14  TV (machine): 390  FiO2: 40  PEEP: 6  ITime: 0.8  MAP: 9  PIP: 21    I&O's Summary    28 Mar 2025 07:01  -  29 Mar 2025 07:00  --------------------------------------------------------  IN: 448 mL / OUT: 1000 mL / NET: -552 mL    29 Mar 2025 07:01  -  30 Mar 2025 05:45  --------------------------------------------------------  IN: 302 mL / OUT: 735 mL / NET: -433 mL      Daily     Daily       PHYSICAL EXAMINATION:  General: Comfortable, no acute distress, cooperative with exam.  HEENT: Moist mucous membranes.  Respiratory: CTAB, normal respiratory effort, no coughing, wheezes, crackles, or rales.  CV: RRR, S1S2, no murmurs, rubs or gallops. No JVD. Distal pulses intact.  Abdominal: Soft, nontender, nondistended, no rebound or guarding, normal bowel sounds.  Neurology: AOx3, no focal neuro defects, DARDEN x 4.  Extremities: No pitting edema, + Peripheral pulses.          LABS:  ABG - ( 30 Mar 2025 02:19 )  pH, Arterial: 7.45  pH, Blood: x     /  pCO2: 34    /  pO2: 165   / HCO3: 24    / Base Excess: -0.1  /  SaO2: 98.3                                    9.0    18.82 )-----------( 144      ( 30 Mar 2025 02:19 )             27.9     03-30    134[L]  |  102  |  28[H]  ----------------------------<  125[H]  3.8   |  19[L]  |  1.61[H]    Ca    7.9[L]      30 Mar 2025 02:19  Phos  2.7     03-30  Mg     2.20     03-30    TPro  6.1  /  Alb  3.1[L]  /  TBili  0.4  /  DBili  x   /  AST  222[H]  /  ALT  322[H]  /  AlkPhos  61  03-30    LIVER FUNCTIONS - ( 30 Mar 2025 02:19 )  Alb: 3.1 g/dL / Pro: 6.1 g/dL / ALK PHOS: 61 U/L / ALT: 322 U/L / AST: 222 U/L / GGT: x           PTT - ( 30 Mar 2025 02:19 )  PTT:68.0 sec    CARDIAC MARKERS ( 29 Mar 2025 04:20 )  x     / x     / x     / x     / 78.6 ng/mL      Urinalysis Basic - ( 30 Mar 2025 02:19 )    Color: x / Appearance: x / SG: x / pH: x  Gluc: 125 mg/dL / Ketone: x  / Bili: x / Urobili: x   Blood: x / Protein: x / Nitrite: x   Leuk Esterase: x / RBC: x / WBC x   Sq Epi: x / Non Sq Epi: x / Bacteria: x    < from: TTE W or WO Ultrasound Enhancing Agent (03.28.25 @ 07:36) >  CONCLUSIONS:      1. Left ventricular cavity is normal in size. Left ventricular systolic function is normal with an ejection fraction visually estimated at 55 to 60 %.   2. There is hypokinesis of the basal inferoseptal, basal inferolateral and basal inferior walls.   3. There is mild (grade 1) left ventricular diastolic dysfunction, with normal left ventricular filling pressure.   4. Normal left and right atrial size.   5. No significant valvular disease.   6. Estimated pulmonary artery systolic pressure is 18 mmHg.   7. Large well circumscribed echolucent mass seen adjacent to the left heart in the subcostal view, finding likely consistent with known large coronary aneurysm.   8. No pericardial effusion seen.   9. The inferior vena cava is normal in size measuring 1.25 cm in diameter, (normal <2.1cm) with normal inspiratory collapse (normal >50%) consistent with normal right atrial pressure (~3, range 0-5mmHg).    ________________________________________________________________________________________  FINDINGS:     Left Ventricle:  Complications from contrast: no adverse reaction. The left ventricular cavity is normal in size. Left ventricular wall thickness is mildly increased. Left ventricular systolic function is normal with an ejection fraction visually estimated at 55 to 60%. Left ventricularregional wall motion assessment reveals hypokinesis of the basal inferoseptal, basal inferolateral and basal inferior walls. There is normal LV mass and concentric remodeling. There is mild (grade 1) left ventricular diastolic dysfunction, with normal left ventricular filling pressure.     Right Ventricle:  The right ventricular cavity is normal in size and right ventricular systolic function is mildly reduced. Tricuspid annular plane systolic excursion (TAPSE) is 1.6 cm (normal >=1.7 cm).     Left Atrium:  The left atrium is normal in size with an indexed volume of 16.91 ml/m².     Right Atrium:  The right atrium is normal in size with an indexed volume of 15.62 ml/m² and an indexed area of 7.00 cm²/m².     Interatrial Septum:  The interatrial septum appears intact.     Aortic Valve:  The aortic valve appears trileaflet with normal systolic excursion. There is no aortic valve stenosis.     Mitral Valve:  Structurally normal mitral valve with normal leaflet excursion. There is normal leaflet mobility of the mitral valve. There is no mitral valve stenosis. There is mild mitral regurgitation. The mitral regurgitant jet is eccentrically directed.     Tricuspid Valve:  Structurally normal tricuspid valve with normal leaflet excursion. There is trace tricuspid regurgitation. Estimated pulmonary artery systolic pressure is 18 mmHg.     Pulmonic Valve:  Structurally normal pulmonic valve with normal leaflet excursion. There is no pulmonic valve stenosis. There is trace pulmonic regurgitation.     Aorta:  The aortic root at the sinuses of Valsalva is normal in size, measuring 3.10 cm (indexed 1.81 cm/m²). The ascending aorta is normal in size, measuring 2.70 cm (indexed 1.57 cm/m²).     Pericardium:  No pericardial effusion seen. Pericardial cyst is seen (5.9 cm) and appears well circumscribed, in the left ventricle, without sepation.     Systemic Veins:  The inferior vena cava is normal in size measuring 1.25 cm in diameter, (normal <2.1cm) with normal inspiratory collapse (normal >50%) consistent with normal right atrial pressure (~3, range 0-5mmHg).    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 Since, she has required assistive devices to ambulate along with 1 person assist. In 2021, she was diagnosed with large coronary artery aneurysms as part of a workup for chest tightness. Cardiac CT showed large aneurysmal dilatation located anterior and superior to LV with calcification/thrombus within wall and compressing LA as well as proximal dilatation of RCA. TTE showed normal biventricular function. LHC showed large saccular aneurysm of proximal LM with LAD and LCx coming off confluence and medium localized aneurysm of RCA. Per CT surgery, determined to not be a CABG or transplant candidate. 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     On 3/27 she was at a doctor's appointment and synopsized in the office. When EMS arrived she had no pulse and CPR was initiated, ROSC after 20-25minutes. Unclear downtime prior to initiation of CPR but EMS arrived 20minutes after they were called. Total downtime could be up to 40-45minutes. She was defibrillated for Vfib. Stabilized at [facility] and brought to [facility]. She had a LHC on admission, no intervention performed.     NEURO  -Baseline: alert and oriented x3, wheelchair bound  - < from: CT Head No Cont (03.28.25 @ 09:18) >  IMPRESSION: No evidence of an acute transcortical infarction or   hemorrhage.    #diffuse cerebral anoxic injury v seizures   -Propofol gtt, turn off 3/28 10 AM  - Neuro recs appreciated  - repeat CTH 3/30 wo contrast for acute change in neurologic status  - neuron specific enolase 24-48 hours and 72 hours post arrest AM labs: 3/29, 3/31  - Video EEG in progress  - Delium w/u: Vit. b 12, folate, B6, thiamine, ETOH level, troponin level, LFT, TSH, Urine toxicology, ammonia  - [facility] MRI brain wo contrast 5-7 days post arrest (04/1/25 - 04/03/25)  - general Neuro check q4h  - EEG, Frequent bifrontal seizures and lateralized rhythmic delta activity (LRDA).  Discussed with neurology team.  - right facial clonic myogenic/movement artifacts near 1-1.5hz, (face not visible on video).  with intermittently more prominent superimposed rapid eye motion artifacts. (from pontine lesion also a consideration)  - Ativan 4mg IVP x 1, Vimpat 200mg IVP x 1  - maintenance dosing of vimpat to 300 mg BID   - confirm WA interval < 200 with EKG ->         PULM  #Respiratory failure due to cardiac arrest  - AC 14/390/40//6  - CXR daily  *  Clear lungs.  pH, Arterial: 7.45  pH, Blood: x     /  pCO2: 34    /  pO2: 165   / HCO3: 24    / Base Excess: -0.1  /  SaO2: 98.3          CARDS  - Left ventricular systolic function is normal with an ejection fraction visually estimated at 55 to 60 %.  -  There is hypokinesis of the basal inferoseptal, basal inferolateral and basal inferior walls.  - There is mild (grade 1) left ventricular diastolic dysfunction, with normal left ventricular filling pressure.  - pbnp 1268   0530 HORACE CO 7.83 CI 4.55 CVP 5  MVO2 80%  Dc'd swan      #Vfib arrest  - Last [facility] 2021: mild CAD, aneurysm LM, LAD, CX, RCA  - Dc'd amiodarone gtt 3/28  - trops 2500, ckmb 160 CKMB index 4  - SRIKANTH inf lead  - [facility] Diagnostic Conclusions:   Excessively ectatic left main coronary artery resulting in difficult  to opacify LAD and Cx coronaries. RCA with proximal segment  ectasia and slow coronary flow. Severe LV systolic dysfunction with at  least moderate-severe mitral regurgitation.  RA 10 mmHg.PA 31/19/24 mmHg. PCW 16 mmHg. Cardiac index 2.57  L/min/m2. SVR 1141 dsc.  - c/w  heparin gtt, blood pressure control  -  Appreciate CTS Dr Amando Aviles  - iso ischemia, coronary artery aneurysm surgery is indicated, appreciate the concerns regarding the patient's functional state.      GI  #OGT in place  -  tube feeds, jevity  - nutrition consult      TISH   - creatinine 1.14 in 2021  - now elevated 1.57 iso cardiac arrest  - Enteric tube with tip in the stomach.    ENDO  - HgA1c 5.3  - TSH 0.73      ID  hyperthermic, leukocytosis  Procalcitonin 1.68  Empiric Zosyn started  persistent fevers  BLD cx penging  Urine cx NGTD      HEME  #History of coronary thrombus   -Start heparin gtt  -Outpt: Eliquis/ASA    Ethics: Full code   CCU Service  Cardiology   Spect: [phone] ([facility])     PATIENT: LIZZIE ESPINOZA, MRN: [id number]    46 y/o wheelchair bound female with PMHx of HTN, CVAx2 (2015) s/p trach and PEG (reversed)c with residual right sided weakness, large coronary artery aneurysms (2021) anterior and superior to LV with calcification/thrombus within wall compressing LA as well as proximal dilatation of RCA. 2021 TTE: normal biventricular function, [facility] 2021. showed large saccular aneurysm of proximal LM with LAD and LCx coming off confluence and medium localized aneurysm of RCA. Per CT surgery, notCABG or transplant candidate. 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   presents to [facility] from routine visit to neurologist (Dr Ortiz) after syncopal event. When EMS arrived she had no pulse and CPR was initiated, ROSC after 20-25minutes. Unclear downtime prior to initiation of CPR but EMS arrived 20minutes after they were called. Total downtime could be up to 40-45minutes. She was defibrillated for Vfib. Stabilized at [facility] and brought to [facility]. She had a LHC on admission, no intervention performed.       transferred to CCU after C without intervention (no intervention). With swan-cande 0530 HORACE CO 7.83 CI 4.55 CVP 5  MVO2 80%      INTERVAL HISTORY/OVERNIGHT EVENTS: coughing overnight when suctioned, pupils pinpoint, fixed  450/14/60/6 decreased to RR 14 for resp alkalosis  Heparin gtt      TELEMETRY: SR one episode NSVT 4 beats    EKG: SR, borderline repol, std V3,4,5      ALLERGIES: Allergies    No Known Allergies    Intolerances        MEDICATIONS:  MEDICATIONS  (STANDING):  chlorhexidine 0.12% Liquid 15 milliLiter(s) Oral Mucosa every 12 hours  chlorhexidine 2% Cloths 1 Application(s) Topical <User Schedule>  heparin  Infusion 800 Unit(s)/Hr (10 mL/Hr) IV Continuous <Continuous>  hydrALAZINE 10 milliGRAM(s) Oral three times a day  isosorbide   dinitrate Tablet (ISORDIL) 10 milliGRAM(s) Oral three times a day  lacosamide Solution 300 milliGRAM(s) Oral two times a day  pantoprazole  Injectable 40 milliGRAM(s) IV Push daily  piperacillin/tazobactam IVPB.. 3.375 Gram(s) IV Intermittent every 12 hours    MEDICATIONS  (PRN):        OBJECTIVE:  ICU Vital Signs Last 24 Hrs  T(C): 37.9 (30 Mar 2025 00:00), Max: 38.6 (29 Mar 2025 12:00)  T(F): 100.2 (30 Mar 2025 00:00), Max: 101.5 (29 Mar 2025 12:00)  HR: 97 (30 Mar 2025 05:04) (89 - 107)  BP: --  BP(mean): --  ABP: 154/93 (30 Mar 2025 03:00) (117/76 - 156/95)  ABP(mean): 116 (30 Mar 2025 03:00) (89 - 116)  RR: 14 (30 Mar 2025 03:00) (14 - 17)  SpO2: 100% (30 Mar 2025 05:04) (100% - 100%)    O2 Parameters below as of 29 Mar 2025 08:00  Patient On (Oxygen Delivery Method): ventilator          Mode: AC/ CMV (Assist Control/ Continuous Mandatory Ventilation)  RR (machine): 14  TV (machine): 390  FiO2: 40  PEEP: 6  ITime: 0.8  MAP: 9  PIP: 21    I&O's Summary    28 Mar 2025 07:01  -  29 Mar 2025 07:00  --------------------------------------------------------  IN: 448 mL / OUT: 1000 mL / NET: -552 mL    29 Mar 2025 07:01  -  30 Mar 2025 05:45  --------------------------------------------------------  IN: 302 mL / OUT: 735 mL / NET: -433 mL      Daily     Daily       PHYSICAL EXAMINATION:  General: Comfortable, no acute distress, cooperative with exam.  HEENT: Moist mucous membranes.  Respiratory: CTAB, normal respiratory effort, no coughing, wheezes, crackles, or rales.  CV: RRR, S1S2, no murmurs, rubs or gallops. No JVD. Distal pulses intact.  Abdominal: Soft, nontender, nondistended, no rebound or guarding, normal bowel sounds.  Neurology: AOx3, no focal neuro defects, DARDEN x 4.  Extremities: No pitting edema, + Peripheral pulses.          LABS:  ABG - ( 30 Mar 2025 02:19 )  pH, Arterial: 7.45  pH, Blood: x     /  pCO2: 34    /  pO2: 165   / HCO3: 24    / Base Excess: -0.1  /  SaO2: 98.3                                    9.0    18.82 )-----------( 144      ( 30 Mar 2025 02:19 )             27.9     03-30    134[L]  |  102  |  28[H]  ----------------------------<  125[H]  3.8   |  19[L]  |  1.61[H]    Ca    7.9[L]      30 Mar 2025 02:19  Phos  2.7     03-30  Mg     2.20     03-30    TPro  6.1  /  Alb  3.1[L]  /  TBili  0.4  /  DBili  x   /  AST  222[H]  /  ALT  322[H]  /  AlkPhos  61  03-30    LIVER FUNCTIONS - ( 30 Mar 2025 02:19 )  Alb: 3.1 g/dL / Pro: 6.1 g/dL / ALK PHOS: 61 U/L / ALT: 322 U/L / AST: 222 U/L / GGT: x           PTT - ( 30 Mar 2025 02:19 )  PTT:68.0 sec    CARDIAC MARKERS ( 29 Mar 2025 04:20 )  x     / x     / x     / x     / 78.6 ng/mL      Urinalysis Basic - ( 30 Mar 2025 02:19 )    Color: x / Appearance: x / SG: x / pH: x  Gluc: 125 mg/dL / Ketone: x  / Bili: x / Urobili: x   Blood: x / Protein: x / Nitrite: x   Leuk Esterase: x / RBC: x / WBC x   Sq Epi: x / Non Sq Epi: x / Bacteria: x    < from: TTE W or WO Ultrasound Enhancing Agent (03.28.25 @ 07:36) >  CONCLUSIONS:      1. Left ventricular cavity is normal in size. Left ventricular systolic function is normal with an ejection fraction visually estimated at 55 to 60 %.   2. There is hypokinesis of the basal inferoseptal, basal inferolateral and basal inferior walls.   3. There is mild (grade 1) left ventricular diastolic dysfunction, with normal left ventricular filling pressure.   4. Normal left and right atrial size.   5. No significant valvular disease.   6. Estimated pulmonary artery systolic pressure is 18 mmHg.   7. Large well circumscribed echolucent mass seen adjacent to the left heart in the subcostal view, finding likely consistent with known large coronary aneurysm.   8. No pericardial effusion seen.   9. The inferior vena cava is normal in size measuring 1.25 cm in diameter, (normal <2.1cm) with normal inspiratory collapse (normal >50%) consistent with normal right atrial pressure (~3, range 0-5mmHg).    ________________________________________________________________________________________  FINDINGS:     Left Ventricle:  Complications from contrast: no adverse reaction. The left ventricular cavity is normal in size. Left ventricular wall thickness is mildly increased. Left ventricular systolic function is normal with an ejection fraction visually estimated at 55 to 60%. Left ventricularregional wall motion assessment reveals hypokinesis of the basal inferoseptal, basal inferolateral and basal inferior walls. There is normal LV mass and concentric remodeling. There is mild (grade 1) left ventricular diastolic dysfunction, with normal left ventricular filling pressure.     Right Ventricle:  The right ventricular cavity is normal in size and right ventricular systolic function is mildly reduced. Tricuspid annular plane systolic excursion (TAPSE) is 1.6 cm (normal >=1.7 cm).     Left Atrium:  The left atrium is normal in size with an indexed volume of 16.91 ml/m².     Right Atrium:  The right atrium is normal in size with an indexed volume of 15.62 ml/m² and an indexed area of 7.00 cm²/m².     Interatrial Septum:  The interatrial septum appears intact.     Aortic Valve:  The aortic valve appears trileaflet with normal systolic excursion. There is no aortic valve stenosis.     Mitral Valve:  Structurally normal mitral valve with normal leaflet excursion. There is normal leaflet mobility of the mitral valve. There is no mitral valve stenosis. There is mild mitral regurgitation. The mitral regurgitant jet is eccentrically directed.     Tricuspid Valve:  Structurally normal tricuspid valve with normal leaflet excursion. There is trace tricuspid regurgitation. Estimated pulmonary artery systolic pressure is 18 mmHg.     Pulmonic Valve:  Structurally normal pulmonic valve with normal leaflet excursion. There is no pulmonic valve stenosis. There is trace pulmonic regurgitation.     Aorta:  The aortic root at the sinuses of Valsalva is normal in size, measuring 3.10 cm (indexed 1.81 cm/m²). The ascending aorta is normal in size, measuring 2.70 cm (indexed 1.57 cm/m²).     Pericardium:  No pericardial effusion seen. Pericardial cyst is seen (5.9 cm) and appears well circumscribed, in the left ventricle, without sepation.     Systemic Veins:  The inferior vena cava is normal in size measuring 1.25 cm in diameter, (normal <2.1cm) with normal inspiratory collapse (normal >50%) consistent with normal right atrial pressure (~3, range 0-5mmHg).    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 Since, she has required assistive devices to ambulate along with 1 person assist. In 2021, she was diagnosed with large coronary artery aneurysms as part of a workup for chest tightness. Cardiac CT showed large aneurysmal dilatation located anterior and superior to LV with calcification/thrombus within wall and compressing LA as well as proximal dilatation of RCA. TTE showed normal biventricular function. LHC showed large saccular aneurysm of proximal LM with LAD and LCx coming off confluence and medium localized aneurysm of RCA. Per CT surgery, determined to not be a CABG or transplant candidate. 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     On 3/27 she was at a doctor's appointment and synopsized in the office. When EMS arrived she had no pulse and CPR was initiated, ROSC after 20-25minutes. Unclear downtime prior to initiation of CPR but EMS arrived 20minutes after they were called. Total downtime could be up to 40-45minutes. She was defibrillated for Vfib. Stabilized at [facility] and brought to [facility]. She had a C on admission, no intervention performed.     NEURO  -Baseline: alert and oriented x3, wheelchair bound  - < from: CT Head No Cont (03.28.25 @ 09:18) >  IMPRESSION: No evidence of an acute transcortical infarction or   hemorrhage.    #diffuse cerebral anoxic injury v seizures   -Propofol gtt, turn off 3/28 10 AM  - Neuro recs appreciated  - repeat CTH 3/30 wo contrast for acute change in neurologic status  - neuron specific enolase 24-48 hours and 72 hours post arrest AM labs: 3/29, 3/31  - Video EEG in progress  - Delium w/u: Vit. b 12, folate, B6, thiamine, ETOH level, troponin level, LFT, TSH, Urine toxicology, ammonia  - HoId MRI brain wo contrast 5-7 days post arrest (04/1/25 - 04/03/25)  - general Neuro check q4h  - EEG, Frequent bifrontal seizures and lateralized rhythmic delta activity (LRDA).  Discussed with neurology team.  - right facial clonic myogenic/movement artifacts near 1-1.5hz, (face not visible on video).  with intermittently more prominent superimposed rapid eye motion artifacts. (from pontine lesion also a consideration)  - Ativan 4mg IVP x 1, Vimpat 200mg IVP x 1  - maintenance dosing of vimpat to 300 mg BID   - confirm DE interval < 200 with EKG ->         PULM  #Respiratory failure due to cardiac arrest  - AC 14/390/40//6  - CXR daily  *  Clear lungs.  pH, Arterial: 7.45  pH, Blood: x     /  pCO2: 34    /  pO2: 165   / HCO3: 24    / Base Excess: -0.1  /  SaO2: 98.3          CARDS  - Left ventricular systolic function is normal with an ejection fraction visually estimated at 55 to 60 %.  -  There is hypokinesis of the basal inferoseptal, basal inferolateral and basal inferior walls.  - There is mild (grade 1) left ventricular diastolic dysfunction, with normal left ventricular filling pressure.  - pbnp 1268   0530 HORACE CO 7.83 CI 4.55 CVP 5  MVO2 80%  Dc'd swan      #Vfib arrest  - Last [facility] 2021: mild CAD, aneurysm LM, LAD, CX, RCA  - Dc'd amiodarone gtt 3/28  - trops 2500, ckmb 160 CKMB index 4  - SRIKANTH inf lead  - [facility] Diagnostic Conclusions:   Excessively ectatic left main coronary artery resulting in difficult  to opacify LAD and Cx coronaries. RCA with proximal segment  ectasia and slow coronary flow. Severe LV systolic dysfunction with at  least moderate-severe mitral regurgitation.  RA 10 mmHg.PA 31/19/24 mmHg. PCW 16 mmHg. Cardiac index 2.57  L/min/m2. SVR 1141 dsc.  - c/w  heparin gtt, blood pressure control  -  Appreciate CTS Dr Amando Aviles  - iso ischemia, coronary artery aneurysm surgery is indicated, appreciate the concerns regarding the patient's functional state.      GI  #OGT in place  -  tube feeds, jevity  - nutrition consult      TISH   - creatinine 1.14 in 2021  - now elevated 1.57 iso cardiac arrest  - Enteric tube with tip in the stomach.    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 2021 TTE: normal biventricular function, [facility] 2021. showed large saccular aneurysm of proximal LM with LAD and LCx coming off confluence and medium localized aneurysm of RCA. Per CT surgery, notCABG or transplant candidate. 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   presents to [facility] from routine visit to neurologist (Dr Ortiz) after syncopal event. When EMS arrived she had no pulse and CPR was initiated, ROSC after 20-25minutes. Unclear downtime prior to initiation of CPR but EMS arrived 20minutes after they were called. Total downtime could be up to 40-45minutes. She was defibrillated for Vfib. Stabilized at [facility] and brought to [facility]. She had a LH on admission, no intervention performed.       transferred to CCU after C without intervention (no intervention). With swan-cande 0530 HORACE CO 7.83 CI 4.55 CVP 5  MVO2 80%      INTERVAL HISTORY/OVERNIGHT EVENTS: coughing overnight when suctioned, pupils pinpoint, fixed  390/14/40/6  Heparin gtt  --Overnight 3/29 with whole body clonic myogenic/movement with high pressure on vent.    --Given 2mg IVP Ativan with termination of event          TELEMETRY: SR one episode NSVT 4 beats    EKG: SR, borderline repol, std V3,4,5      ALLERGIES: Allergies    No Known Allergies    Intolerances        MEDICATIONS:  MEDICATIONS  (STANDING):  chlorhexidine 0.12% Liquid 15 milliLiter(s) Oral Mucosa every 12 hours  chlorhexidine 2% Cloths 1 Application(s) Topical <User Schedule>  heparin  Infusion 800 Unit(s)/Hr (10 mL/Hr) IV Continuous <Continuous>  hydrALAZINE 10 milliGRAM(s) Oral three times a day  isosorbide   dinitrate Tablet (ISORDIL) 10 milliGRAM(s) Oral three times a day  lacosamide Solution 300 milliGRAM(s) Oral two times a day  pantoprazole  Injectable 40 milliGRAM(s) IV Push daily  piperacillin/tazobactam IVPB.. 3.375 Gram(s) IV Intermittent every 12 hours    MEDICATIONS  (PRN):        OBJECTIVE:  ICU Vital Signs Last 24 Hrs  T(C): 37.9 (30 Mar 2025 00:00), Max: 38.6 (29 Mar 2025 12:00)  T(F): 100.2 (30 Mar 2025 00:00), Max: 101.5 (29 Mar 2025 12:00)  HR: 97 (30 Mar 2025 05:04) (89 - 107)  BP: --  BP(mean): --  ABP: 154/93 (30 Mar 2025 03:00) (117/76 - 156/95)  ABP(mean): 116 (30 Mar 2025 03:00) (89 - 116)  RR: 14 (30 Mar 2025 03:00) (14 - 17)  SpO2: 100% (30 Mar 2025 05:04) (100% - 100%)    O2 Parameters below as of 29 Mar 2025 08:00  Patient On (Oxygen Delivery Method): ventilator          Mode: AC/ CMV (Assist Control/ Continuous Mandatory Ventilation)  RR (machine): 14  TV (machine): 390  FiO2: 40  PEEP: 6  ITime: 0.8  MAP: 9  PIP: 21    I&O's Summary    28 Mar 2025 07:01  -  29 Mar 2025 07:00  --------------------------------------------------------  IN: 448 mL / OUT: 1000 mL / NET: -552 mL    29 Mar 2025 07:01  -  30 Mar 2025 05:45  --------------------------------------------------------  IN: 302 mL / OUT: 735 mL / NET: -433 mL      Daily     Daily       PHYSICAL EXAMINATION:  General: Comfortable, no acute distress, cooperative with exam.  HEENT: Moist mucous membranes.  Respiratory: CTAB, normal respiratory effort, no coughing, wheezes, crackles, or rales.  CV: RRR, S1S2, no murmurs, rubs or gallops. No JVD. Distal pulses intact.  Abdominal: Soft, nontender, nondistended, no rebound or guarding, normal bowel sounds.  Neurology: AOx3, no focal neuro defects, DARDEN x 4.  Extremities: No pitting edema, + Peripheral pulses.          LABS:  ABG - ( 30 Mar 2025 02:19 )  pH, Arterial: 7.45  pH, Blood: x     /  pCO2: 34    /  pO2: 165   / HCO3: 24    / Base Excess: -0.1  /  SaO2: 98.3                                    9.0    18.82 )-----------( 144      ( 30 Mar 2025 02:19 )             27.9     03-30    134[L]  |  102  |  28[H]  ----------------------------<  125[H]  3.8   |  19[L]  |  1.61[H]    Ca    7.9[L]      30 Mar 2025 02:19  Phos  2.7     03-30  Mg     2.20     03-30    TPro  6.1  /  Alb  3.1[L]  /  TBili  0.4  /  DBili  x   /  AST  222[H]  /  ALT  322[H]  /  AlkPhos  61  03-30    LIVER FUNCTIONS - ( 30 Mar 2025 02:19 )  Alb: 3.1 g/dL / Pro: 6.1 g/dL / ALK PHOS: 61 U/L / ALT: 322 U/L / AST: 222 U/L / GGT: x           PTT - ( 30 Mar 2025 02:19 )  PTT:68.0 sec    CARDIAC MARKERS ( 29 Mar 2025 04:20 )  x     / x     / x     / x     / 78.6 ng/mL      Urinalysis Basic - ( 30 Mar 2025 02:19 )    Color: x / Appearance: x / SG: x / pH: x  Gluc: 125 mg/dL / Ketone: x  / Bili: x / Urobili: x   Blood: x / Protein: x / Nitrite: x   Leuk Esterase: x / RBC: x / WBC x   Sq Epi: x / Non Sq Epi: x / Bacteria: x    < from: TTE W or WO Ultrasound Enhancing Agent (03.28.25 @ 07:36) >  CONCLUSIONS:      1. Left ventricular cavity is normal in size. Left ventricular systolic function is normal with an ejection fraction visually estimated at 55 to 60 %.   2. There is hypokinesis of the basal inferoseptal, basal inferolateral and basal inferior walls.   3. There is mild (grade 1) left ventricular diastolic dysfunction, with normal left ventricular filling pressure.   4. Normal left and right atrial size.   5. No significant valvular disease.   6. Estimated pulmonary artery systolic pressure is 18 mmHg.   7. Large well circumscribed echolucent mass seen adjacent to the left heart in the subcostal view, finding likely consistent with known large coronary aneurysm.   8. No pericardial effusion seen.   9. The inferior vena cava is normal in size measuring 1.25 cm in diameter, (normal <2.1cm) with normal inspiratory collapse (normal >50%) consistent with normal right atrial pressure (~3, range 0-5mmHg).    ________________________________________________________________________________________  FINDINGS:     Left Ventricle:  Complications from contrast: no adverse reaction. The left ventricular cavity is normal in size. Left ventricular wall thickness is mildly increased. Left ventricular systolic function is normal with an ejection fraction visually estimated at 55 to 60%. Left ventricularregional wall motion assessment reveals hypokinesis of the basal inferoseptal, basal inferolateral and basal inferior walls. There is normal LV mass and concentric remodeling. There is mild (grade 1) left ventricular diastolic dysfunction, with normal left ventricular filling pressure.     Right Ventricle:  The right ventricular cavity is normal in size and right ventricular systolic function is mildly reduced. Tricuspid annular plane systolic excursion (TAPSE) is 1.6 cm (normal >=1.7 cm).     Left Atrium:  The left atrium is normal in size with an indexed volume of 16.91 ml/m².     Right Atrium:  The right atrium is normal in size with an indexed volume of 15.62 ml/m² and an indexed area of 7.00 cm²/m².     Interatrial Septum:  The interatrial septum appears intact.     Aortic Valve:  The aortic valve appears trileaflet with normal systolic excursion. There is no aortic valve stenosis.     Mitral Valve:  Structurally normal mitral valve with normal leaflet excursion. There is normal leaflet mobility of the mitral valve. There is no mitral valve stenosis. There is mild mitral regurgitation. The mitral regurgitant jet is eccentrically directed.     Tricuspid Valve:  Structurally normal tricuspid valve with normal leaflet excursion. There is trace tricuspid regurgitation. Estimated pulmonary artery systolic pressure is 18 mmHg.     Pulmonic Valve:  Structurally normal pulmonic valve with normal leaflet excursion. There is no pulmonic valve stenosis. There is trace pulmonic regurgitation.     Aorta:  The aortic root at the sinuses of Valsalva is normal in size, measuring 3.10 cm (indexed 1.81 cm/m²). The ascending aorta is normal in size, measuring 2.70 cm (indexed 1.57 cm/m²).     Pericardium:  No pericardial effusion seen. Pericardial cyst is seen (5.9 cm) and appears well circumscribed, in the left ventricle, without sepation.     Systemic Veins:  The inferior vena cava is normal in size measuring 1.25 cm in diameter, (normal <2.1cm) with normal inspiratory collapse (normal >50%) consistent with normal right atrial pressure (~3, range 0-5mmHg).    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 Since, she has required assistive devices to ambulate along with 1 person assist. In 2021, she was diagnosed with large coronary artery aneurysms as part of a workup for chest tightness. Cardiac CT showed large aneurysmal dilatation located anterior and superior to LV with calcification/thrombus within wall and compressing LA as well as proximal dilatation of RCA. TTE showed normal biventricular function. LHC showed large saccular aneurysm of proximal LM with LAD and LCx coming off confluence and medium localized aneurysm of RCA. Per CT surgery, determined to not be a CABG or transplant candidate. 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     On 3/27 she was at a doctor's appointment and synopsized in the office. When EMS arrived she had no pulse and CPR was initiated, ROSC after 20-25minutes. Unclear downtime prior to initiation of CPR but EMS arrived 20minutes after they were called. Total downtime could be up to 40-45minutes. She was defibrillated for Vfib. Stabilized at [facility] and brought to [facility]. She had a [facility] on admission, no intervention performed.     NEURO  -Baseline: alert and oriented x3, wheelchair bound  - < from: CT Head No Cont (03.28.25 @ 09:18) >  IMPRESSION: No evidence of an acute transcortical infarction or   hemorrhage.    #diffuse cerebral anoxic injury v seizures   -Propofol gtt, turn off 3/28 10 AM  - Neuro recs appreciated  - repeat CTH 3/30 wo contrast for acute change in neurologic status  - neuron specific enolase 24-48 hours and 72 hours post arrest AM labs: 3/29, 3/31  - Video EEG in progress  - Delium w/u: Vit. b 12, folate, B6, thiamine, ETOH level, troponin level, LFT, TSH, Urine toxicology, ammonia  - HoId MRI brain wo contrast 5-7 days post arrest (04/1/25 - 04/03/25)  - general Neuro check q4h  - EEG, Frequent bifrontal seizures and lateralized rhythmic delta activity (LRDA).  Discussed with neurology team.  - right facial clonic myogenic/movement artifacts near 1-1.5hz, (face not visible on video).  with intermittently more prominent superimposed rapid eye motion artifacts. (from pontine lesion also a consideration)  - Ativan 4mg IVP x 1, Vimpat 200mg IVP x 1  - maintenance dosing of vimpat to 300 mg BID   - confirm ID interval < 200 with EKG ->   --Overnight 3/29 with whole body clonic myogenic/movement with high pressure on vent.    --Given 2mg IVP Ativan with termination of event          PULM  #Respiratory failure due to cardiac arrest  - AC 14/390/40//6  - CXR daily  *  Clear lungs.  pH, Arterial: 7.45  pH, Blood: x     /  pCO2: 34    /  pO2: 165   / HCO3: 24    / Base Excess: -0.1  /  SaO2: 98.3          CARDS  - Left ventricular systolic function is normal with an ejection fraction visually estimated at 55 to 60 %.  -  There is hypokinesis of the basal inferoseptal, basal inferolateral and basal inferior walls.  - There is mild (grade 1) left ventricular diastolic dysfunction, with normal left ventricular filling pressure.  - pbnp 1268   0530 HORACE CO 7.83 CI 4.55 CVP 5  MVO2 80%  Dc'd swan      #Vfib arrest  - Last [facility] 2021: mild CAD, aneurysm LM, LAD, CX, RCA  - Dc'd amiodarone gtt 3/28  - trops 2500, ckmb 160 CKMB index 4  - SRIKANTH inf lead  - [facility] Diagnostic Conclusions:   Excessively ectatic left main coronary artery resulting in difficult  to opacify LAD and Cx coronaries. RCA with proximal segment  ectasia and slow coronary flow. Severe LV systolic dysfunction with at  least moderate-severe mitral regurgitation.  RA 10 mmHg.PA 31/19/24 mmHg. PCW 16 mmHg. Cardiac index 2.57  L/min/m2. SVR 1141 dsc.  - c/w  heparin gtt, blood pressure control  -  Appreciate CTS Dr Amando Aviles  - iso ischemia, coronary artery aneurysm surgery is indicated, appreciate the concerns regarding the patient's functional state.      GI  #OGT in place  -  tube feeds, jevity  - nutrition consult      TISH   - creatinine 1.14 in 2021  - now elevated 1.57 iso cardiac arrest  - Enteric tube with tip in the stomach.    ENDO  - HgA1c 5.3  - TSH 0.73      ID  hyperthermic, leukocytosis  Procalcitonin 1.68  Empiric Zosyn started  persistent fevers  BLD cx penging  Urine cx NGTD      HEME  #History of coronary thrombus   -Start heparin gtt  -Outpt: Eliquis/ASA    Ethics: Full code

## 2025-03-30 NOTE — CHART NOTE - NSCHARTNOTEFT_GEN_A_CORE
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  Cardiac arrest on 3/27, ROSC after 20-25minutes. Unclear downtime.   --Overnight 3/29 with whole body clonic myogenic/movement with high pressure on vent.    --Given 2mg IVP Ativan with termination of event  --VEEG on  --c/w with vimpat  --neuro following

## 2025-03-30 NOTE — CHART NOTE - NSCHARTNOTEFT_GEN_A_CORE
Briefly discussed pt's current illness, EEG and CT head findings with pt's Mom and sister at bedside. Due to pt's severe illness, prolonged downtime from cardiac arrest, possible infection, anoxic brain injury and seizure like activity pt is gravely ill and likely suffered irreversible brain injury and the likelihood of making any meaningful recovery is low. Discussed GOC and further treatment with family, they are understanding pt is critically ill and likely with irreversible brain injury and unlikely to return to her prior baseline functional status. They also understand the chances of recovery from a second cardiac arrest is traumatic and likely to futile. Also discussed considering capping current interventions and pressors. Family is understanding of pt's grave prognosis and requested time to make informed decision regarding GOC and further treatment.     Code status: Full code.

## 2025-03-31 NOTE — PROGRESS NOTE ADULT - SUBJECTIVE AND OBJECTIVE BOX
--------------------------  Des Rdz MD  Internal Medicine   PGY-1  --------------------------    SUBJECTIVE / OVERNIGHT EVENTS:    Pt seen in AM at bedside, on vent. Unable to participate in ROS.    MEDICATIONS  (STANDING):  chlorhexidine 0.12% Liquid 15 milliLiter(s) Oral Mucosa every 12 hours  chlorhexidine 2% Cloths 1 Application(s) Topical <User Schedule>  heparin  Infusion 800 Unit(s)/Hr (12 mL/Hr) IV Continuous <Continuous>  hydrALAZINE 25 milliGRAM(s) Oral three times a day  isosorbide   dinitrate Tablet (ISORDIL) 10 milliGRAM(s) Oral three times a day  lacosamide Solution 300 milliGRAM(s) Oral two times a day  pantoprazole  Injectable 40 milliGRAM(s) IV Push daily  piperacillin/tazobactam IVPB.. 3.375 Gram(s) IV Intermittent every 12 hours    MEDICATIONS  (PRN):    CAPILLARY BLOOD GLUCOSE    I&O's Summary    30 Mar 2025 07:01  -  31 Mar 2025 07:00  --------------------------------------------------------  IN: 965.5 mL / OUT: 800 mL / NET: 165.5 mL    PHYSICAL EXAM:  Vital Signs Last 24 Hrs  T(C): 37.1 (31 Mar 2025 04:00), Max: 38.1 (30 Mar 2025 16:00)  T(F): 98.8 (31 Mar 2025 04:00), Max: 100.6 (30 Mar 2025 16:00)  HR: 100 (31 Mar 2025 07:48) (89 - 114)  RR: 14 (31 Mar 2025 06:00) (14 - 17)  SpO2: 100% (31 Mar 2025 07:48) (94% - 100%)    Parameters below as of 30 Mar 2025 14:00  Patient On (Oxygen Delivery Method): ventilator    HEAD:  Atraumatic, Normocephalic  EYES: EOMI, pinpoint pupils, non reactive, conjunctiva and sclera clear. No corneal reflex  ENT: Moist mucous membranes  NECK: Well healed tracheostomy scar   CHEST/LUNG: Clear to auscultation bilaterally; No rales, rhonchi, wheezing, or rubs. Unlabored respirations  HEART: Regular rate and rhythm; No murmurs, rubs, or gallops  ABDOMEN: BSx4; Soft, nontender, nondistended  EXTREMITIES:  2+ Peripheral Pulses, brisk capillary refill. No clubbing, cyanosis, or edema  NERVOUS SYSTEM:  Unresponsive to painful stimuli. No gag reflex, no corneal reflex. Facial myoclonus of chin/mouth  SKIN: No rashes or lesions  LABS:                        8.8    16.90 )-----------( 155      ( 31 Mar 2025 01:11 )             27.4     03-31    136  |  104  |  24[H]  ----------------------------<  173[H]  3.7   |  19[L]  |  1.32[H]    Ca    8.2[L]      31 Mar 2025 01:11  Phos  1.9     03-31  Mg     2.20     03-31    TPro  6.3  /  Alb  3.2[L]  /  TBili  0.3  /  DBili  x   /  AST  157[H]  /  ALT  219[H]  /  AlkPhos  60  03-31    PTT - ( 31 Mar 2025 01:11 )  PTT:50.1 sec  CARDIAC MARKERS ( 30 Mar 2025 02:19 )  x     / x     / x     / x     / 21.2 ng/mL    Urinalysis Basic- ( 31 Mar 2025 01:11 )  Color: x / Appearance: x / SG: x / pH: x  Gluc: 173 mg/dL / Ketone: x  / Bili: x / Urobili: x   Blood: x / Protein: x / Nitrite: x   Leuk Esterase: x / RBC: x / WBC x   Sq Epi: x / Non Sq Epi: x / Bacteria: x    Culture - Blood (collected 29 Mar 2025 10:00)  Source: Blood Blood-Peripheral  Preliminary Report (30 Mar 2025 13:01):    No growth at 24 hours    Culture - Blood (collected 29 Mar 2025 10:00)  Source: Blood Blood-Peripheral  Preliminary Report (30 Mar 2025 13:01):    No growth at 24 hours    Culture - Urine (collected 28 Mar 2025 10:21)  Source: Catheterized Catheterized  Final Report (29 Mar 2025 14:54):    No growth    Culture - Urine (collected 28 Mar 2025 09:58)  Source: Catheterized Catheterized  Final Report (29 Mar 2025 06:18):    No growth    RADIOLOGY & ADDITIONAL TESTS:  Results Reviewed: X  Imaging Personally Reviewed: X  Electrocardiogram Personally Reviewed: X --------------------------  Des Rdz MD  Internal Medicine   PGY-1  --------------------------  46 y/o wheelchair bound female with PMHx of HTN, CVAx2 (2015) s/p trach and PEG (reversed)c with residual right sided weakness, large coronary artery aneurysms (2021) anterior and superior to LV with calcification/thrombus within wall compressing LA as well as proximal dilatation of RCA. 2021 TTE: normal biventricular function, [facility] 2021. showed large saccular aneurysm of proximal LM with LAD and LCx coming off confluence and medium localized aneurysm of RCA. Per CT surgery, notCABG or transplant candidate. 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   presents to [facility] from routine visit to neurologist (Dr Ortiz) after syncopal event. When EMS arrived she had no pulse and CPR was initiated, ROSC after 20-25minutes. Unclear downtime prior to initiation of CPR but EMS arrived 20minutes after they were called. Total downtime could be up to 40-45minutes. She was defibrillated for Vfib. Stabilized at [facility] and brought to [facility]. She had a LHC on admission, no intervention performed. Transferred to CCU after LHC without intervention (no intervention).     SUBJECTIVE / OVERNIGHT EVENTS:    Pt seen in AM at bedside, on vent. Unable to participate in ROS. With twitching of mouth and b/l eyelids.     MEDICATIONS  (STANDING):  chlorhexidine 0.12% Liquid 15 milliLiter(s) Oral Mucosa every 12 hours  chlorhexidine 2% Cloths 1 Application(s) Topical <User Schedule>  heparin  Infusion 800 Unit(s)/Hr (12 mL/Hr) IV Continuous <Continuous>  hydrALAZINE 25 milliGRAM(s) Oral three times a day  isosorbide   dinitrate Tablet (ISORDIL) 10 milliGRAM(s) Oral three times a day  lacosamide Solution 300 milliGRAM(s) Oral two times a day  pantoprazole  Injectable 40 milliGRAM(s) IV Push daily  piperacillin/tazobactam IVPB.. 3.375 Gram(s) IV Intermittent every 12 hours    MEDICATIONS  (PRN):    CAPILLARY BLOOD GLUCOSE    I&O's Summary    30 Mar 2025 07:01  -  31 Mar 2025 07:00  --------------------------------------------------------  IN: 965.5 mL / OUT: 800 mL / NET: 165.5 mL    PHYSICAL EXAM:  Vital Signs Last 24 Hrs  T(C): 37.1 (31 Mar 2025 04:00), Max: 38.1 (30 Mar 2025 16:00)  T(F): 98.8 (31 Mar 2025 04:00), Max: 100.6 (30 Mar 2025 16:00)  HR: 100 (31 Mar 2025 07:48) (89 - 114)  RR: 14 (31 Mar 2025 06:00) (14 - 17)  SpO2: 100% (31 Mar 2025 07:48) (94% - 100%)    Parameters below as of 30 Mar 2025 14:00  Patient On (Oxygen Delivery Method): ventilator    HEAD:  Atraumatic, Normocephalic  EYES: EOMI, pinpoint pupils, non reactive, conjunctiva and sclera clear. No corneal reflex  ENT: Moist mucous membranes  NECK: Well healed tracheostomy scar   CHEST/LUNG: Clear to auscultation bilaterally; No rales, rhonchi, wheezing, or rubs. Unlabored respirations  HEART: Regular rate and rhythm; No murmurs, rubs, or gallops  ABDOMEN: BSx4; Soft, nontender, nondistended  EXTREMITIES:  2+ Peripheral Pulses, brisk capillary refill. No clubbing, cyanosis, or edema  NERVOUS SYSTEM:  Unresponsive to painful stimuli. No gag reflex, no corneal reflex. Facial myoclonus of chin/mouth  SKIN: No rashes or lesions    LABS:                        8.8    16.90 )-----------( 155      ( 31 Mar 2025 01:11 )             27.4     03-31    136  |  104  |  24[H]  ----------------------------<  173[H]  3.7   |  19[L]  |  1.32[H]    Ca    8.2[L]      31 Mar 2025 01:11  Phos  1.9     03-31  Mg     2.20     03-31    TPro  6.3  /  Alb  3.2[L]  /  TBili  0.3  /  DBili  x   /  AST  157[H]  /  ALT  219[H]  /  AlkPhos  60  03-31    PTT - ( 31 Mar 2025 01:11 )  PTT:50.1 sec  CARDIAC MARKERS ( 30 Mar 2025 02:19 )  x     / x     / x     / x     / 21.2 ng/mL    Urinalysis Basic- ( 31 Mar 2025 01:11 )  Color: x / Appearance: x / SG: x / pH: x  Gluc: 173 mg/dL / Ketone: x  / Bili: x / Urobili: x   Blood: x / Protein: x / Nitrite: x   Leuk Esterase: x / RBC: x / WBC x   Sq Epi: x / Non Sq Epi: x / Bacteria: x    Culture - Blood (collected 29 Mar 2025 10:00)  Source: Blood Blood-Peripheral  Preliminary Report (30 Mar 2025 13:01):    No growth at 24 hours    Culture - Blood (collected 29 Mar 2025 10:00)  Source: Blood Blood-Peripheral  Preliminary Report (30 Mar 2025 13:01):    No growth at 24 hours    Culture - Urine (collected 28 Mar 2025 10:21)  Source: Catheterized Catheterized  Final Report (29 Mar 2025 14:54):    No growth    Culture - Urine (collected 28 Mar 2025 09:58)  Source: Catheterized Catheterized  Final Report (29 Mar 2025 06:18):    No growth    RADIOLOGY & ADDITIONAL TESTS:  Results Reviewed: X  Imaging Personally Reviewed: X  Electrocardiogram Personally Reviewed: X

## 2025-03-31 NOTE — CHART NOTE - NSCHARTNOTEFT_GEN_A_CORE
Further discussed GOC with patient's mother (Evelin Gu, HCP), and patient's cousin (caretaker) in family waiting room. HCP, mother, would like to make the patient DNR; however, continue intubation for the time being. Moreover, HCP would like to clarify "no pressors" in the case of further needed hemodynamic support. OPAL, making clear both of these decisions, signed and placed in chart.    Des Rdz M.D.   PGY-1 Internal Medicine

## 2025-03-31 NOTE — CHART NOTE - NSCHARTNOTEFT_GEN_A_CORE
Yesterday, patient had repeat CT head performed which revealed catastrophic anoxic brain injury. EEG study up to that point had been difficult to interpret but there had been possibility for ongoing status. However, given the results of patients CT scan, will not benefit from any further EEG monitoring or escalation in anti-seizure medications at this time. The combination of patients recent CT scan, EEG background that is suppressed, exam that has worsened and now with decerebrate posturing, all portends grave prognosis.    Neurology available for family meeting if helpful.  Please call ahead of time to schedule.

## 2025-03-31 NOTE — PROGRESS NOTE ADULT - ASSESSMENT
NEURO  -Baseline: alert and oriented x3, wheelchair bound  - < from: CT Head No Cont (03.28.25 @ 09:18) >  IMPRESSION: No evidence of an acute transcortical infarction or   hemorrhage.    #diffuse cerebral anoxic injury v seizures   -Propofol gtt, turn off 3/28 10 AM  - Neuro recs appreciated  - repeat CTH 3/30 wo contrast for acute change in neurologic status  - neuron specific enolase 24-48 hours and 72 hours post arrest AM labs: 3/29, 3/31  - Video EEG in progress  - Delium w/u: Vit. b 12, folate, B6, thiamine, ETOH level, troponin level, LFT, TSH, Urine toxicology, ammonia  - [facility] MRI brain wo contrast 5-7 days post arrest (04/1/25 - 04/03/25)  - general Neuro check q4h  - EEG, Frequent bifrontal seizures and lateralized rhythmic delta activity (LRDA).  Discussed with neurology team.  - right facial clonic myogenic/movement artifacts near 1-1.5hz, (face not visible on video).  with intermittently more prominent superimposed rapid eye motion artifacts. (from pontine lesion also a consideration)  - Ativan 4mg IVP x 1, Vimpat 200mg IVP x 1  - maintenance dosing of vimpat to 300 mg BID   - confirm WV interval < 200 with EKG ->   --Overnight 3/29 with whole body clonic myogenic/movement with high pressure on vent.    --Given 2mg IVP Ativan with termination of event      PULM  #Respiratory failure due to cardiac arrest  - AC 14/390/40//6  - CXR daily  *  Clear lungs.  pH, Arterial: 7.45  pH, Blood: x     /  pCO2: 34    /  pO2: 165   / HCO3: 24    / Base Excess: -0.1  /  SaO2: 98.3        CARDS  - Left ventricular systolic function is normal with an ejection fraction visually estimated at 55 to 60 %.  -  There is hypokinesis of the basal inferoseptal, basal inferolateral and basal inferior walls.  - There is mild (grade 1) left ventricular diastolic dysfunction, with normal left ventricular filling pressure.  - pbnp 1268   0530 HORACE CO 7.83 CI 4.55 CVP 5  MVO2 80%  Dc'd swan      #Vfib arrest  - Last [facility] 2021: mild CAD, aneurysm LM, LAD, CX, RCA  - Dc'd amiodarone gtt 3/28  - trops 2500, ckmb 160 CKMB index 4  - SRIKANTH inf lead  - C Diagnostic Conclusions:   Excessively ectatic left main coronary artery resulting in difficult  to opacify LAD and Cx coronaries. RCA with proximal segment  ectasia and slow coronary flow. Severe LV systolic dysfunction with at  least moderate-severe mitral regurgitation.  RA 10 mmHg.PA 31/19/24 mmHg. PCW 16 mmHg. Cardiac index 2.57  L/min/m2. SVR 1141 dsc.  - c/w  heparin gtt, blood pressure control  -  Appreciate CTS Dr Amando Aviles  - iso ischemia, coronary artery aneurysm surgery is indicated, appreciate the concerns regarding the patient's functional state.    GI  #OGT in place  -  tube feeds, jevity  - nutrition consult      TISH   - creatinine 1.14 in 2021  - now elevated 1.57 iso cardiac arrest  - Enteric tube with tip in the stomach.    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 [facility] on admission re-demonstrating aneurysmal dilation w/ no intervention performed. Currently intubated, no on sedation. Hospital course c/b tonic/clonic seizures possibly 2.2 to anoxic brain injury. Poor prognosis for recovery, ongoing GOCs w/ family.     NEURO  -Baseline: alert and oriented x3, wheelchair bound    ## Diffuse cerebral anoxic injury v seizures   - Off sedation (propofol gtt since 3/28 AM)  - Neuro consulted: appreciate recs   > CTH (3/30): w/ diffuse loss of gray-white matter c/w global anoxic brain injury & diffuse enhancement of hemispheric sulci and basal cisterns c/w elevate supratentorial pressures w/ no herniation or midline shifts.   > f/u neuron specific enolase: 3/28, 3/29, 3/30.  > Video EEG (3/30): diffuse cerebral dysfunction; continuous right frontal, periodic spiky muscle artifact possibly explaining facial myoclonus   > Delium w/u (Vit. b 12, folate, B6, thiamine, ETOH level, troponin level, LFT, TSH, Urine toxicology): non-revealing.  > [facility] MRI brain wo contrast 5-7 days post arrest (04/1/25 - 04/03/25)  > General Neuro check q4h  -- Overnight 3/29 with whole body clonic myogenic/movement with high pressure on vent.    > s/p Ativan 4mg IVP x 1, Vimpat 200mg IVP x 1  - c/w Vimpat 300 mg BID     PULM  ## Respiratory failure due to cardiac arrest  - CXR daily  - Clear lungs.    CARDS  TTE (03/28):   > LVSF w/ normal EF 55 to 60 %. Hypokinesis of the basal inferoseptal, basal inferolateral and basal inferior walls. Mild (grade 1) LV diastolic dysfunction.  - ProBNP 1268     ## Vfib Arrest  - Last [facility] 2021: Mild CAD; Aneurysms in LM, LAD, CX, RCA.  - Trops 2500, CKMB 160 CKMB index 4; EKG w/ SRIKANTH in inferior leads  - [facility] Diagnostic Conclusions (3/27): Ectatic LCA, difficulty to opacify LAD and Cx. Severe LV systolic dysfunction w/ moderate-severe MR.  - Transition heparin gtt to Eliquis 5 BID for LV Thrombus  - c/w close blood pressure control    GI  ## OGT in place  - c/w tube feeds, jevity  - Nutrition c/s'd: appreciate recs      ## TISH   - creatinine 1.14 in 2021  - Elevated on admission; now DOWNTRENDING.    ENDO  - HgA1c 5.3  - TSH 0.73    ID  ## Persistent fevers,  - Persistent Leukocytosis, though downtrending.  - Procalcitonin 1.68  - c/w empiric Zosyn   - BCx: NGTD  - UCx: NGTD    HEME  #History of coronary thrombus   - Heparin gtt -> Eliquis 5 BID    PALLIATIVE  #GOC  - Palliative c/s'd: appreciate recs  - Active GOCs w/ family regarding prognosis (brain death) and further interventions.  - CODE: Full code

## 2025-04-01 NOTE — PROGRESS NOTE ADULT - ASSESSMENT
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 [facility] on admission re-demonstrating aneurysmal dilation w/ no intervention performed. Currently intubated, no on sedation, no response to noxious stimuli, no mental status, no purposeful movement, questionable gag reflex, otherwise no other brain stem reflexes. Hospital course c/b tonic/clonic seizures possibly 2.2 to anoxic brain injury. Poor prognosis for recovery, ongoing GOCs w/ family.     NEURO  -Baseline: alert and oriented x3, wheelchair bound    ## Diffuse cerebral anoxic injury v seizures   - Off sedation (propofol gtt since 3/28 AM)  - Neuro consulted: appreciate recs   > CTH (3/30): w/ diffuse loss of gray-white matter c/w global anoxic brain injury & diffuse enhancement of hemispheric sulci and basal cisterns c/w elevate supratentorial pressures w/ no herniation or midline shifts.   > f/u neuron specific enolase: 3/28, 3/29, 3/30.  > Video EEG (3/30): diffuse cerebral dysfunction; continuous right frontal, periodic spiky muscle artifact possibly explaining facial myoclonus   > Delium w/u (Vit. b 12, folate, B6, thiamine, ETOH level, troponin level, LFT, TSH, Urine toxicology): non-revealing.  > f/u MRI brain wo contrast (ordered 04/01 per Neuro)  > General Neuro check q12  -- Overnight 3/29 with whole body clonic myogenic/movement with high pressure on vent.    > s/p Ativan 4mg IVP x 1, Vimpat 200mg IVP x 1  - c/w Vimpat 300 mg BID     PULM  ## Respiratory failure due to cardiac arrest  - CXR daily  - Clear lungs.    CARDS  TTE (03/28):   > LVSF w/ normal EF 55 to 60 %. Hypokinesis of the basal inferoseptal, basal inferolateral and basal inferior walls. Mild (grade 1) LV diastolic dysfunction.  - ProBNP 1268     ## Vfib Arrest  - Last [facility] 2021: Mild CAD; Aneurysms in LM, LAD, CX, RCA.  - Trops 2500, CKMB 160 CKMB index 4; EKG w/ SRIKANTH in inferior leads  - [facility] Diagnostic Conclusions (3/27): Ectatic LCA, difficulty to opacify LAD and Cx. Severe LV systolic dysfunction w/ moderate-severe MR.  - c/w Eliquis 5 BID for LV Thrombus  - c/w close blood pressure control    GI  ## OGT in place  - c/w tube feeds, jevity  - Nutrition c/s'd: appreciate recs      ## TISH   - creatinine 1.14 in 2021  - sCr elevated on admission; now DOWNTRENDING.    ENDO  - HgA1c 5.3  - TSH 0.73    ID  ## Persistent fevers,  - Persistent Leukocytosis, though downtrending.  - Procalcitonin 1.68  - c/w empiric Zosyn   - BCx: NGTD  - UCx: NGTD    HEME  #History of coronary thrombus   - c/w Eliquis 5 BID for LV thrombus (heparin gtt d/c'd 03/31)    PALLIATIVE  #GOC  - Palliative c/s'd: appreciate recs  - Active GOCs w/ family regarding prognosis (brain death) and further interventions.  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 [facility] on admission re-demonstrating aneurysmal dilation w/ no intervention performed. Currently intubated, no on sedation, no response to noxious stimuli, no mental status, no purposeful movement, questionable gag reflex, otherwise no other brain stem reflexes. Hospital course c/b tonic/clonic seizures possibly 2.2 to anoxic brain injury. Poor prognosis for recovery, ongoing GOCs w/ family.     NEURO  -Baseline: alert and oriented x3, wheelchair bound    ## Diffuse cerebral anoxic injury v seizures   - Off sedation (propofol gtt since 3/28 AM)  - Neuro consulted: appreciate recs   > CTH (3/30): w/ diffuse loss of gray-white matter c/w global anoxic brain injury & diffuse enhancement of hemispheric sulci and basal cisterns c/w elevate supratentorial pressures w/ no herniation or midline shifts.   > f/u neuron specific enolase: 3/28, 3/29, 3/30.  > Video EEG (3/30): diffuse cerebral dysfunction; continuous right frontal, periodic spiky muscle artifact possibly explaining facial myoclonus   > Delium w/u (Vit. b 12, folate, B6, thiamine, ETOH level, troponin level, LFT, TSH, Urine toxicology): non-revealing.  > no role for MRI brain wo contrast per Neuro  > General Neuro check q12  -- Overnight 3/29 with whole body clonic myogenic/movement with high pressure on vent.    > s/p Ativan 4mg IVP x 1, Vimpat 200mg IVP x 1  - c/w Vimpat 300 mg BID     PULM  ## Respiratory failure due to cardiac arrest  - CXR daily  - Clear lungs.    CARDS  TTE (03/28):   > LVSF w/ normal EF 55 to 60 %. Hypokinesis of the basal inferoseptal, basal inferolateral and basal inferior walls. Mild (grade 1) LV diastolic dysfunction.  - ProBNP 1268     ## Vfib Arrest  - Last [facility] 2021: Mild CAD; Aneurysms in LM, LAD, CX, RCA.  - Trops 2500, CKMB 160 CKMB index 4; EKG w/ SRIKANTH in inferior leads  - [facility] Diagnostic Conclusions (3/27): Ectatic LCA, difficulty to opacify LAD and Cx. Severe LV systolic dysfunction w/ moderate-severe MR.  - c/w Eliquis 5 BID for LV Thrombus  - c/w close blood pressure control    GI  ## OGT in place  - c/w tube feeds, jevity  - Nutrition c/s'd: appreciate recs      ## TISH   - creatinine 1.14 in 2021  - sCr elevated on admission; now DOWNTRENDING.    ENDO  - HgA1c 5.3  - TSH 0.73    ID  ## Persistent fevers,  - Persistent Leukocytosis, though downtrending.  - Procalcitonin 1.68  - c/w empiric Zosyn   - BCx: NGTD  - UCx: NGTD    HEME  #History of coronary thrombus   - c/w Eliquis 5 BID for LV thrombus (heparin gtt d/c'd 03/31)    PALLIATIVE  #GOC  - Palliative c/s'd: appreciate recs  - Active GOCs w/ family regarding prognosis (brain death) and further interventions.  - CODE: Full code

## 2025-04-01 NOTE — PROGRESS NOTE ADULT - SUBJECTIVE AND OBJECTIVE BOX
--------------------------  Des Rdz MD  Internal Medicine   PGY-1  --------------------------    46 y/o wheelchair bound female with PMHx of HTN, CVAx2 (2015) s/p trach and PEG (reversed)c with residual right sided weakness, large coronary artery aneurysms (2021) anterior and superior to LV with calcification/thrombus within wall compressing LA as well as proximal dilatation of RCA. 2021 TTE: normal biventricular function, [facility] 2021. showed large saccular aneurysm of proximal LM with LAD and LCx coming off confluence and medium localized aneurysm of RCA. Per CT surgery, notCABG or transplant candidate. 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   presents to [facility] from routine visit to neurologist (Dr Ortiz) after syncopal event. When EMS arrived she had no pulse and CPR was initiated, ROSC after 20-25minutes. Unclear downtime prior to initiation of CPR but EMS arrived 20minutes after they were called. Total downtime could be up to 40-45minutes. She was defibrillated for Vfib. Stabilized at [facility] and brought to [facility]. She had a LHC on admission, no intervention performed. Transferred to CCU after LHC without intervention (no intervention).     SUBJECTIVE / OVERNIGHT EVENTS:    Pt seen in AM at bedside, intubated, off sedation. Unable to participate in ROS.   - Mother at bedside noting cough/ gag w/ deep suction.   - Otherwise, mental status unchanged.     MEDICATIONS  (STANDING):  apixaban 5 milliGRAM(s) Oral two times a day  chlorhexidine 0.12% Liquid 15 milliLiter(s) Oral Mucosa every 12 hours  chlorhexidine 2% Cloths 1 Application(s) Topical <User Schedule>  hydrALAZINE 25 milliGRAM(s) Oral three times a day  isosorbide   dinitrate Tablet (ISORDIL) 10 milliGRAM(s) Oral three times a day  lacosamide Solution 300 milliGRAM(s) Oral two times a day  pantoprazole  Injectable 40 milliGRAM(s) IV Push daily  piperacillin/tazobactam IVPB.. 3.375 Gram(s) IV Intermittent every 12 hours  scopolamine 1 mG/72 Hr(s) Patch 1 Patch Transdermal every 72 hours    MEDICATIONS  (PRN):  CAPILLARY BLOOD GLUCOSE    I&O's Summary    31 Mar 2025 07:01  -  01 Apr 2025 07:00  --------------------------------------------------------  IN: 1605.5 mL / OUT: 615 mL / NET: 990.5 mL    PHYSICAL EXAM:  Vital Signs Last 24 Hrs  T(C): 37.8 (01 Apr 2025 04:00), Max: 38 (31 Mar 2025 16:00)  T(F): 100 (01 Apr 2025 04:00), Max: 100.4 (31 Mar 2025 16:00)  HR: 135 (01 Apr 2025 07:00) (98 - 138)  RR: 20 (01 Apr 2025 07:00) (14 - 27)  SpO2: 97% (01 Apr 2025 07:00) (86% - 100%)    Parameters below as of 01 Apr 2025 07:00    O2 Concentration (%): 40    HEAD: Atraumatic, Normocephalic  EYES: EOMI, pinpoint pupils, non reactive, conjunctiva and sclera clear. No corneal reflex  ENT: Moist mucous membranes  NECK: Well healed tracheostomy scar   CHEST/LUNG: Clear to auscultation bilaterally; No rales, rhonchi, wheezing, or rubs. Unlabored respirations  HEART: Regular rate and rhythm; No murmurs, rubs, or gallops  ABDOMEN: BSx4; Soft, nontender, nondistended  EXTREMITIES:  2+ Peripheral Pulses, brisk capillary refill. No clubbing, cyanosis, or edema  NERVOUS SYSTEM:  Unresponsive to painful stimuli. ?gag reflex, (-) no corneal reflex. Facial myoclonus of chin/mouth  SKIN: No rashes or lesions    LABS:                        8.4    14.01 )-----------( 179      ( 01 Apr 2025 03:13 )             26.5     04-01    139  |  107  |  23  ----------------------------<  139[H]  3.7   |  22  |  1.25    Ca    8.3[L]      01 Apr 2025 03:13  Phos  2.5     04-01  Mg     2.10     04-01    TPro  6.2  /  Alb  3.1[L]  /  TBili  0.3  /  DBili  x   /  AST  140[H]  /  ALT  160[H]  /  AlkPhos  56  04-01    PTT - ( 31 Mar 2025 14:37 )  PTT:29.1 sec    Urinalysis Basic - ( 01 Apr 2025 03:13 )  Color: x / Appearance: x / SG: x / pH: x  Gluc: 139 mg/dL / Ketone: x  / Bili: x / Urobili: x   Blood: x / Protein: x / Nitrite: x   Leuk Esterase: x / RBC: x / WBC x   Sq Epi: x / Non Sq Epi: x / Bacteria: x    Culture - Blood (collected 29 Mar 2025 10:00)  Source: Blood Blood-Peripheral  Preliminary Report (31 Mar 2025 13:01):    No growth at 48 Hours    Culture - Blood (collected 29 Mar 2025 10:00)  Source: Blood Blood-Peripheral  Preliminary Report (31 Mar 2025 13:01):    No growth at 48 Hours    RADIOLOGY & ADDITIONAL TESTS:  Results Reviewed: X  Imaging Personally Reviewed: X  Electrocardiogram Personally Reviewed: X

## 2025-04-02 NOTE — PROGRESS NOTE ADULT - ASSESSMENT
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 [facility] on admission re-demonstrating aneurysmal dilation w/ no intervention performed. Currently intubated, no on sedation, no response to noxious stimuli, no mental status, no purposeful movement, questionable gag reflex, otherwise no other brain stem reflexes. Hospital course c/b tonic/clonic seizures possibly 2.2 to anoxic brain injury. Poor prognosis for recovery, ongoing GOCs w/ family.     NEURO  -Baseline: alert and oriented x3, wheelchair bound    ## Diffuse cerebral anoxic injury v seizures   - Off sedation (propofol gtt since 3/28 AM)  - Neuro consulted: appreciate recs   > CTH (3/30): w/ diffuse loss of gray-white matter c/w global anoxic brain injury & diffuse enhancement of hemispheric sulci and basal cisterns c/w elevate supratentorial pressures w/ no herniation or midline shifts.   > neuron specific enolase (3/28, 3/29, 3/30; 24, 48, 72 hours post-arrest): 68, 160, 402  > Video EEG (3/30): diffuse cerebral dysfunction; continuous right frontal, periodic spiky muscle artifact possibly explaining facial myoclonus   > Delium w/u (Vit. b 12, folate, B6, thiamine, ETOH level, troponin level, LFT, TSH, Urine toxicology): non-revealing.  > no role for MRI brain wo contrast per Neuro  > General Neuro check q12  -- Overnight 3/29 with whole body clonic myogenic/movement with high pressure on vent.    > s/p Ativan 4mg IVP x 1, Vimpat 200mg IVP x 1  - c/w Vimpat 300 mg BID     PULM  ## Respiratory failure due to cardiac arrest  - CXR daily  - Clear lungs.    CARDS  TTE (03/28):   > LVSF w/ normal EF 55 to 60 %. Hypokinesis of the basal inferoseptal, basal inferolateral and basal inferior walls. Mild (grade 1) LV diastolic dysfunction.  - ProBNP 1268     ## Vfib Arrest  - Last [facility] 2021: Mild CAD; Aneurysms in LM, LAD, CX, RCA.  - Trops 2500, CKMB 160 CKMB index 4; EKG w/ SRIKANTH in inferior leads  - [facility] Diagnostic Conclusions (3/27): Ectatic LCA, difficulty to opacify LAD and Cx. Severe LV systolic dysfunction w/ moderate-severe MR.  - c/w Eliquis 5 BID for LV Thrombus  - c/w close blood pressure control    GI  ## OGT in place  - c/w tube feeds, jevity  - Nutrition c/s'd: appreciate recs      ## TISH   - creatinine 1.14 in 2021  - sCr elevated on admission; now DOWNTRENDING.    ENDO  - HgA1c 5.3  - TSH 0.73    ID  ## Persistent fevers,  - Persistent Leukocytosis, though downtrending.  - Procalcitonin 1.68  - c/w empiric Zosyn   - BCx: NGTD  - UCx: NGTD    HEME  #History of coronary thrombus   - c/w Eliquis 5 BID for LV thrombus (heparin gtt d/c'd 03/31)    PALLIATIVE  #GOC  - Palliative c/s'd: appreciate recs  - Active GOCs w/ family regarding prognosis (brain death) and further interventions.  - CODE: Full code

## 2025-04-02 NOTE — PROGRESS NOTE ADULT - SUBJECTIVE AND OBJECTIVE BOX
--------------------------  Des Rdz MD  Internal Medicine   PGY-1  --------------------------    44 y/o wheelchair bound female with PMHx of HTN, CVAx2 (2015) s/p trach and PEG (reversed)c with residual right sided weakness, large coronary artery aneurysms (2021) anterior and superior to LV with calcification/thrombus within wall compressing LA as well as proximal dilatation of RCA. 2021 TTE: normal biventricular function, [facility] 2021. showed large saccular aneurysm of proximal LM with LAD and LCx coming off confluence and medium localized aneurysm of RCA. Per CT surgery, notCABG or transplant candidate. 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   presents to [facility] from routine visit to neurologist (Dr Ortiz) after syncopal event. When EMS arrived she had no pulse and CPR was initiated, ROSC after 20-25minutes. Unclear downtime prior to initiation of CPR but EMS arrived 20minutes after they were called. Total downtime could be up to 40-45minutes. She was defibrillated for Vfib. Stabilized at [facility] and brought to [facility]. She had a LHC on admission, no intervention performed. Transferred to CCU after LHC without intervention (no intervention).     SUBJECTIVE / OVERNIGHT EVENTS:    Pt seen in AM at bedside, intubated, off sedation. Unable to participate in ROS. +/- gag reflex; otherwise, no brainstem reflexes elicited/mental status unchanged.    MEDICATIONS  (STANDING):  apixaban 5 milliGRAM(s) Oral two times a day  chlorhexidine 0.12% Liquid 15 milliLiter(s) Oral Mucosa every 12 hours  chlorhexidine 2% Cloths 1 Application(s) Topical <User Schedule>  hydrALAZINE 25 milliGRAM(s) Oral three times a day  isosorbide   dinitrate Tablet (ISORDIL) 10 milliGRAM(s) Oral three times a day  lacosamide Solution 300 milliGRAM(s) Oral two times a day  metoprolol tartrate 25 milliGRAM(s) Oral two times a day  pantoprazole  Injectable 40 milliGRAM(s) IV Push daily  piperacillin/tazobactam IVPB.. 3.375 Gram(s) IV Intermittent every 12 hours  scopolamine 1 mG/72 Hr(s) Patch 1 Patch Transdermal every 72 hours  senna 2 Tablet(s) Oral at bedtime    MEDICATIONS  (PRN):  glycopyrrolate Injectable 0.2 milliGRAM(s) IV Push every 6 hours PRN Secretions  polyethylene glycol 3350 17 Gram(s) Oral every 24 hours PRN Constipation    CAPILLARY BLOOD GLUCOSE    I&O's Summary    01 Apr 2025 07:01  -  02 Apr 2025 07:00  --------------------------------------------------------  IN: 1795 mL / OUT: 815 mL / NET: 980 mL    PHYSICAL EXAM:  Vital Signs Last 24 Hrs  T(C): 38.6 (02 Apr 2025 04:00), Max: 38.6 (01 Apr 2025 20:00)  T(F): 101.5 (02 Apr 2025 04:00), Max: 101.5 (01 Apr 2025 20:00)  HR: 105 (02 Apr 2025 07:00) (105 - 139)  RR: 14 (02 Apr 2025 07:00) (14 - 22)  SpO2: 99% (02 Apr 2025 07:00) (97% - 100%)    Parameters below as of 02 Apr 2025 07:00  Patient On (Oxygen Delivery Method): ventilator    O2 Concentration (%): 40    HEAD: Atraumatic, Normocephalic  EYES: EOMI, pinpoint pupils, non reactive, conjunctiva and sclera clear. No corneal reflex  ENT: Moist mucous membranes  NECK: Well healed tracheostomy scar   CHEST/LUNG: Clear to auscultation bilaterally; No rales, rhonchi, wheezing, or rubs. Unlabored respirations  HEART: Regular rate and rhythm; No murmurs, rubs, or gallops  ABDOMEN: BSx4; Soft, nontender, nondistended  EXTREMITIES:  2+ Peripheral Pulses, brisk capillary refill. No clubbing, cyanosis, or edema  NERVOUS SYSTEM:  Unresponsive to painful stimuli. ?gag reflex, (-) no corneal reflex. Facial myoclonus of chin/mouth  SKIN: No rashes or lesions    LABS:                        8.2    17.62 )-----------( 198      ( 02 Apr 2025 03:45 )             25.8     04-02    139  |  106  |  30[H]  ----------------------------<  134[H]  4.3   |  21[L]  |  1.33[H]    Ca    8.6      02 Apr 2025 03:45  Phos  2.7     04-02  Mg     2.10     04-02    TPro  6.1  /  Alb  3.1[L]  /  TBili  0.2  /  DBili  x   /  AST  122[H]  /  ALT  137[H]  /  AlkPhos  57  04-02    PTT - ( 31 Mar 2025 14:37 )  PTT:29.1 sec    Urinalysis Basic - ( 02 Apr 2025 03:45 )  Color: x / Appearance: x / SG: x / pH: x  Gluc: 134 mg/dL / Ketone: x  / Bili: x / Urobili: x   Blood: x / Protein: x / Nitrite: x   Leuk Esterase: x / RBC: x / WBC x   Sq Epi: x / Non Sq Epi: x / Bacteria: x    RADIOLOGY & ADDITIONAL TESTS:  Results Reviewed: X  Imaging Personally Reviewed: X  Electrocardiogram Personally Reviewed: X

## 2025-04-03 NOTE — PROGRESS NOTE ADULT - ASSESSMENT
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 [facility] on admission re-demonstrating aneurysmal dilation w/ no intervention performed. Currently intubated, no on sedation, no response to noxious stimuli, no mental status, no purposeful movement, questionable gag reflex, otherwise no other brain stem reflexes. Hospital course c/b tonic/clonic seizures possibly 2.2 to anoxic brain injury. Poor prognosis for recovery, ongoing GOCs w/ family.     NEURO  -Baseline: alert and oriented x3, wheelchair bound    ## Diffuse cerebral anoxic injury v seizures   - Off sedation (propofol gtt since 3/28 AM) -> restarted prop 04/02.  - Neuro consulted: appreciate recs   > CTH (3/30): w/ diffuse loss of gray-white matter c/w global anoxic brain injury & diffuse enhancement of hemispheric sulci and basal cisterns c/w elevate supratentorial pressures w/ no herniation or midline shifts.   > neuron specific enolase (3/28, 3/29, 3/30; 24, 48, 72 hours post-arrest): 68, 160, 402  > Video EEG (3/30): diffuse cerebral dysfunction; continuous right frontal, periodic spiky muscle artifact possibly explaining facial myoclonus   > Delium w/u (Vit. b 12, folate, B6, thiamine, ETOH level, troponin level, LFT, TSH, Urine toxicology): non-revealing.  > no role for MRI brain wo contrast per Neuro  > General Neuro check q12  -- Overnight 3/29 with whole body clonic myogenic/movement with high pressure on vent.    > s/p Ativan 4mg IVP x 1, Vimpat 200mg IVP x 1  - c/w Vimpat 300 mg BID   - Consideration for palliative extubation on 04/02.     PULM  ## Respiratory failure due to cardiac arrest  - CXR daily  - Clear lungs.    CARDS  TTE (03/28):   > LVSF w/ normal EF 55 to 60 %. Hypokinesis of the basal inferoseptal, basal inferolateral and basal inferior walls. Mild (grade 1) LV diastolic dysfunction.  - ProBNP 1268     ## Vfib Arrest  - Last [facility] 2021: Mild CAD; Aneurysms in LM, LAD, CX, RCA.  - Trops 2500, CKMB 160 CKMB index 4; EKG w/ SRIKANTH in inferior leads  - [facility] Diagnostic Conclusions (3/27): Ectatic LCA, difficulty to opacify LAD and Cx. Severe LV systolic dysfunction w/ moderate-severe MR.  - HOLD (04/02) Eliquis 5 BID (previously on for LV Thrombus) per family request given menstrual bleeding for patient comfort    GI  ## OGT in place  - c/w tube feeds, jevity  - Nutrition c/s'd: appreciate recs      ## TISH   - creatinine 1.14 in 2021  - sCr elevated on admission; now DOWNTRENDING.    ENDO  - HgA1c 5.3  - TSH 0.73    ID  ## Persistent fevers,  - Persistent Leukocytosis, though downtrending.  - Procalcitonin 1.68  - c/w empiric Zosyn   - BCx: NGTD  - UCx: NGTD    HEME  #History of coronary thrombus   - HOLD (04/02) Eliquis 5 BID (previously on for LV Thrombus) per family request given menstrual bleeding for patient comfort    PALLIATIVE  #GOC  - Palliative c/s'd: appreciate recs  - Active GOCs w/ family regarding prognosis (brain death) and further interventions.  - CODE: Full code

## 2025-04-03 NOTE — PROGRESS NOTE ADULT - SUBJECTIVE AND OBJECTIVE BOX
--------------------------  Des Rdz MD  Internal Medicine   PGY-1  --------------------------    44 y/o wheelchair bound female with PMHx of HTN, CVAx2 (2015) s/p trach and PEG (reversed)c with residual right sided weakness, large coronary artery aneurysms (2021) anterior and superior to LV with calcification/thrombus within wall compressing LA as well as proximal dilatation of RCA. 2021 TTE: normal biventricular function, [facility] 2021. showed large saccular aneurysm of proximal LM with LAD and LCx coming off confluence and medium localized aneurysm of RCA. Per CT surgery, notCABG or transplant candidate. 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 presents to [facility] from routine visit to neurologist (Dr Ortiz) after syncopal event. When EMS arrived she had no pulse and CPR was initiated, ROSC after 20-25minutes. Unclear downtime prior to initiation of CPR but EMS arrived 20minutes after they were called. Total downtime could be up to 40-45minutes. She was defibrillated for Vfib. Stabilized at [facility] and brought to [facility]. She had a LHC on admission, no intervention performed. Transferred to CCU after LHC without intervention (no intervention).     SUBJECTIVE / OVERNIGHT EVENTS:    Pt seen in AM at bedside, intubated, on sedation. Unable to participate in ROS.  - Propofol gtt added. Eliquis stopped for menstrual bleeding/patient comfort.  - Consideration for palliative extubation on 04/02.     MEDICATIONS  (STANDING):  acetaminophen   IVPB .. 1000 milliGRAM(s) IV Intermittent once  chlorhexidine 0.12% Liquid 15 milliLiter(s) Oral Mucosa every 12 hours  chlorhexidine 2% Cloths 1 Application(s) Topical <User Schedule>  hydrALAZINE 25 milliGRAM(s) Oral three times a day  isosorbide   dinitrate Tablet (ISORDIL) 10 milliGRAM(s) Oral three times a day  lacosamide Solution 300 milliGRAM(s) Oral two times a day  metoprolol tartrate 25 milliGRAM(s) Oral two times a day  pantoprazole  Injectable 40 milliGRAM(s) IV Push daily  piperacillin/tazobactam IVPB.. 3.375 Gram(s) IV Intermittent every 12 hours  propofol Infusion 10 MICROgram(s)/kG/Min (3.9 mL/Hr) IV Continuous <Continuous>  scopolamine 1 mG/72 Hr(s) Patch 1 Patch Transdermal every 72 hours  senna 2 Tablet(s) Oral at bedtime    MEDICATIONS  (PRN):  acetaminophen     Tablet .. 650 milliGRAM(s) Oral every 6 hours PRN Temp greater or equal to 38C (100.4F)  glycopyrrolate Injectable 0.2 milliGRAM(s) IV Push every 6 hours PRN Secretions  polyethylene glycol 3350 17 Gram(s) Oral every 24 hours PRN Constipation  psyllium Powder 1 Packet(s) Oral three times a day PRN Constipation    CAPILLARY BLOOD GLUCOSE    I&O's Summary    02 Apr 2025 07:01  -  03 Apr 2025 07:00  --------------------------------------------------------  IN: 1882.8 mL / OUT: 892 mL / NET: 990.8 mL    PHYSICAL EXAM:  Vital Signs Last 24 Hrs  T(C): 38.4 (03 Apr 2025 04:00), Max: 38.4 (03 Apr 2025 04:00)  T(F): 101.1 (03 Apr 2025 04:00), Max: 101.1 (03 Apr 2025 04:00)  HR: 121 (03 Apr 2025 06:00) (99 - 126)  RR: 15 (03 Apr 2025 06:00) (14 - 16)  SpO2: 100% (03 Apr 2025 06:00) (95% - 100%)    Parameters below as of 03 Apr 2025 06:00  Patient On (Oxygen Delivery Method): ventilator    O2 Concentration (%): 40    HEAD: Atraumatic, Normocephalic  EYES: EOMI, pinpoint pupils, non reactive, conjunctiva and sclera clear. No corneal reflex  ENT: Moist mucous membranes  NECK: Well healed tracheostomy scar   CHEST/LUNG: Clear to auscultation bilaterally; No rales, rhonchi, wheezing, or rubs. Unlabored respirations  HEART: Regular rate and rhythm; No murmurs, rubs, or gallops  ABDOMEN: BSx4; Soft, nontender, nondistended  EXTREMITIES:  2+ Peripheral Pulses, brisk capillary refill. No clubbing, cyanosis, or edema  NERVOUS SYSTEM:  Unresponsive to painful stimuli. (+)gag reflex, (-) no corneal reflex. (+)Facial myoclonus of chin/mouth  SKIN: No rashes or lesions    LABS:                        8.0    19.60 )-----------( 206      ( 03 Apr 2025 03:39 )             25.6     04-03    138  |  104  |  27[H]  ----------------------------<  138[H]  4.6   |  22  |  1.21    Ca    8.6      03 Apr 2025 03:39  Phos  3.2     04-03  Mg     1.90     04-03    TPro  6.3  /  Alb  3.1[L]  /  TBili  0.2  /  DBili  x   /  AST  87[H]  /  ALT  112[H]  /  AlkPhos  54  04-03    Urinalysis Basic - ( 03 Apr 2025 03:39 )  Color: x / Appearance: x / SG: x / pH: x  Gluc: 138 mg/dL / Ketone: x  / Bili: x / Urobili: x   Blood: x / Protein: x / Nitrite: x   Leuk Esterase: x / RBC: x / WBC x   Sq Epi: x / Non Sq Epi: x / Bacteria: x    RADIOLOGY & ADDITIONAL TESTS:  Results Reviewed: X  Imaging Personally Reviewed: X  Electrocardiogram Personally Reviewed: X

## 2025-04-04 NOTE — CHART NOTE - NSCHARTNOTEFT_GEN_A_CORE
Per [facility] representative, for evaluation for organ donation, pt in need of   [ ] TTE +/- LHC  [ ] Bronchoscopy  [ ] CT chest/abd/pelvis w contrast Per [facility] representative, for evaluation for organ donation, pt in need of   [ ] TTE +/- LHC  [ ] Bronchoscopy  [ ] CT chest/abd/pelvis

## 2025-04-04 NOTE — PROGRESS NOTE ADULT - ASSESSMENT
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 [facility] on admission re-demonstrating aneurysmal dilation w/ no intervention performed. Currently intubated, no on sedation, no response to noxious stimuli, no mental status, no purposeful movement, questionable gag reflex, otherwise no other brain stem reflexes. Hospital course c/b tonic/clonic seizures possibly 2.2 to anoxic brain injury. Poor prognosis for recovery, ongoing GOCs w/ family.     NEURO  -Baseline: alert and oriented x3, wheelchair bound    ## Diffuse cerebral anoxic injury v seizures   - Off sedation (propofol gtt since 3/28 AM) -> restarted prop 04/02.  - Neuro consulted: appreciate recs   > CTH (3/30): w/ diffuse loss of gray-white matter c/w global anoxic brain injury & diffuse enhancement of hemispheric sulci and basal cisterns c/w elevate supratentorial pressures w/ no herniation or midline shifts.   > neuron specific enolase (3/28, 3/29, 3/30; 24, 48, 72 hours post-arrest): 68, 160, 402  > Video EEG (3/30): diffuse cerebral dysfunction; continuous right frontal, periodic spiky muscle artifact possibly explaining facial myoclonus   > Delium w/u (Vit. b 12, folate, B6, thiamine, ETOH level, troponin level, LFT, TSH, Urine toxicology): non-revealing.  > no role for MRI brain wo contrast per Neuro  > General Neuro check q12  -- Overnight 3/29 with whole body clonic myogenic/movement with high pressure on vent.    > s/p Ativan 4mg IVP x 1, Vimpat 200mg IVP x 1  - c/w Vimpat 300 mg BID   - Consideration for palliative extubation on 04/02.     PULM  ## Respiratory failure due to cardiac arrest  - CXR daily  - Clear lungs.    CARDS  TTE (03/28):   > LVSF w/ normal EF 55 to 60 %. Hypokinesis of the basal inferoseptal, basal inferolateral and basal inferior walls. Mild (grade 1) LV diastolic dysfunction.  - ProBNP 1268     ## Vfib Arrest  - Last [facility] 2021: Mild CAD; Aneurysms in LM, LAD, CX, RCA.  - Trops 2500, CKMB 160 CKMB index 4; EKG w/ SRIKANTH in inferior leads  - [facility] Diagnostic Conclusions (3/27): Ectatic LCA, difficulty to opacify LAD and Cx. Severe LV systolic dysfunction w/ moderate-severe MR.  - HOLD (04/02) Eliquis 5 BID (previously on for LV Thrombus) per family request given menstrual bleeding for patient comfort    GI  ## OGT in place  - c/w tube feeds, jevity  - Nutrition c/s'd: appreciate recs      ## TISH   - creatinine 1.14 in 2021  - sCr elevated on admission; now DOWNTRENDING.    ENDO  - HgA1c 5.3  - TSH 0.73    ID  ## Persistent fevers,  - Persistent Leukocytosis, though downtrending.  - Procalcitonin 1.68  - s/p 10x day course of empiric Zosyn   - BCx: NGTD  - UCx: NGTD    HEME  #History of coronary thrombus   - HOLD (04/02) Eliquis 5 BID (previously on for LV Thrombus) per family request given menstrual bleeding for patient comfort    PALLIATIVE  #GOC  - Active GOCs w/ family regarding prognosis (brain death) and further interventions.  - CODE: Full code

## 2025-04-04 NOTE — PROGRESS NOTE ADULT - SUBJECTIVE AND OBJECTIVE BOX
--------------------------  Des Rdz MD  Internal Medicine   PGY-1  --------------------------    44 y/o wheelchair bound female with PMHx of HTN, CVAx2 (2015) s/p trach and PEG (reversed)c with residual right sided weakness, large coronary artery aneurysms (2021) anterior and superior to LV with calcification/thrombus within wall compressing LA as well as proximal dilatation of RCA. 2021 TTE: normal biventricular function, [facility] 2021. showed large saccular aneurysm of proximal LM with LAD and LCx coming off confluence and medium localized aneurysm of RCA. Per CT surgery, notCABG or transplant candidate. 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   presents to [facility] from routine visit to neurologist (Dr Ortiz) after syncopal event. When EMS arrived she had no pulse and CPR was initiated, ROSC after 20-25minutes. Unclear downtime prior to initiation of CPR but EMS arrived 20minutes after they were called. Total downtime could be up to 40-45minutes. She was defibrillated for Vfib. Stabilized at [facility] and brought to [facility]. She had a LHC on admission, no intervention performed. Transferred to CCU after LHC without intervention (no intervention).     SUBJECTIVE / OVERNIGHT EVENTS:    Pt seen in AM at bedside, intubated, on sedation. Unable to participate in ROS. (+) gag reflex; otherwise, no brainstem reflexes elicited/mental status unchanged.    MEDICATIONS  (STANDING):  chlorhexidine 0.12% Liquid 15 milliLiter(s) Oral Mucosa every 12 hours  chlorhexidine 2% Cloths 1 Application(s) Topical <User Schedule>  hydrALAZINE 25 milliGRAM(s) Oral three times a day  isosorbide   dinitrate Tablet (ISORDIL) 10 milliGRAM(s) Oral three times a day  lacosamide Solution 300 milliGRAM(s) Oral two times a day  metoprolol tartrate 25 milliGRAM(s) Oral two times a day  pantoprazole  Injectable 40 milliGRAM(s) IV Push daily  propofol Infusion 10 MICROgram(s)/kG/Min (3.9 mL/Hr) IV Continuous <Continuous>  scopolamine 1 mG/72 Hr(s) Patch 1 Patch Transdermal every 72 hours  senna 2 Tablet(s) Oral at bedtime    MEDICATIONS  (PRN):  acetaminophen     Tablet .. 650 milliGRAM(s) Oral every 6 hours PRN Temp greater or equal to 38C (100.4F)  glycopyrrolate Injectable 0.2 milliGRAM(s) IV Push every 6 hours PRN Secretions  polyethylene glycol 3350 17 Gram(s) Oral every 24 hours PRN Constipation  psyllium Powder 1 Packet(s) Oral three times a day PRN Constipation    CAPILLARY BLOOD GLUCOSE    I&O's Summary  03 Apr 2025 07:01  -  04 Apr 2025 07:00  --------------------------------------------------------  IN: 1505 mL / OUT: 1000 mL / NET: 505 mL    PHYSICAL EXAM:  Vital Signs Last 24 Hrs  T(C): 37.4 (04 Apr 2025 04:00), Max: 38.3 (03 Apr 2025 08:00)  T(F): 99.3 (04 Apr 2025 04:00), Max: 100.9 (03 Apr 2025 08:00)  HR: 94 (04 Apr 2025 06:55) (94 - 124)  RR: 14 (04 Apr 2025 06:00) (14 - 20)  SpO2: 100% (04 Apr 2025 06:55) (98% - 100%)    Parameters below as of 04 Apr 2025 06:00  Patient On (Oxygen Delivery Method): ventilator    O2 Concentration (%): 40    HEAD: Atraumatic, Normocephalic  EYES: EOMI, pinpoint pupils, non reactive, conjunctiva and sclera clear. No corneal reflex  ENT: Moist mucous membranes  NECK: Well healed tracheostomy scar   CHEST/LUNG: Clear to auscultation bilaterally; No rales, rhonchi, wheezing, or rubs. Unlabored respirations  HEART: Regular rate and rhythm; No murmurs, rubs, or gallops  ABDOMEN: BSx4; Soft, nontender, nondistended  EXTREMITIES:  2+ Peripheral Pulses, brisk capillary refill. No clubbing, cyanosis, or edema  NERVOUS SYSTEM:  Unresponsive to painful stimuli. (+) gag reflex, (-) no corneal reflex. (+) Facial myoclonus of chin/mouth  SKIN: No rashes or lesions    LABS:                        7.3    15.50 )-----------( 232      ( 04 Apr 2025 01:44 )             23.5     04-04    141  |  103  |  26[H]  ----------------------------<  137[H]  4.6   |  22  |  1.20    Ca    8.5      04 Apr 2025 01:44  Phos  3.8     04-04  Mg     2.10     04-04    TPro  6.1  /  Alb  3.0[L]  /  TBili  0.2  /  DBili  x   /  AST  65[H]  /  ALT  86[H]  /  AlkPhos  53  04-04    Urinalysis Basic - ( 04 Apr 2025 01:44 )  Color: x / Appearance: x / SG: x / pH: x  Gluc: 137 mg/dL / Ketone: x  / Bili: x / Urobili: x   Blood: x / Protein: x / Nitrite: x   Leuk Esterase: x / RBC: x / WBC x   Sq Epi: x / Non Sq Epi: x / Bacteria: x    RADIOLOGY & ADDITIONAL TESTS:  Results Reviewed: X  Imaging Personally Reviewed: X  Electrocardiogram Personally Reviewed: X

## 2025-04-05 NOTE — CONSULT NOTE ADULT - SUBJECTIVE AND OBJECTIVE BOX
HPI:  45 year old female with PMH CVA with residual R weakness, coronary artery aneurysms, saccular aneursys of L main, HTN who presented from [facility] after cardiac arrest at neurologist's office with unclear downtime and 20-25 minutes CPD. Patient was transferred to [facility] for C, no interventional performed. Patient without meaningful recovery, currently undergoing workup for organ donation. Pulmonology consulted for bronchoscopy as part of organ donation evaluation.     PAST MEDICAL & SURGICAL HISTORY:  HTN (hypertension)      CVA (cerebrovascular accident)      Coronary artery aneurysm      History of tracheostomy      PEG (percutaneous endoscopic gastrostomy) status          FAMILY HISTORY:      SOCIAL HISTORY:  Smoking: [ ] Never Smoked [ ] Former Smoker (__ packs x ___ years) [ ] Current Smoker  (__ packs x ___ years)  Substance Use: [ ] Never Used [ ] Used ____  EtOH Use:  Marital Status: [ ] Single [ ]  [ ]  [ ]   Sexual History:   Occupation:  Recent Travel:  Country of Birth:  Advance Directives:    Allergies    No Known Allergies    Intolerances        HOME MEDICATIONS:  Home Medications:  amLODIPine 10 mg oral tablet: 1 tab(s) orally once a day (27 Mar 2025 18:35)  aspirin 81 mg oral delayed release tablet: 1 tab(s) orally once a day (27 Mar 2025 18:35)  atenolol 100 mg oral tablet: 1 tab(s) orally once a day (27 Mar 2025 18:34)  cloNIDine 0.1 mg oral tablet: 1 tab(s) orally 2 times a day (27 Mar 2025 18:35)  Eliquis 2.5 mg oral tablet: 1 tab(s) orally 2 times a day (27 Mar 2025 18:35)  losartan 25 mg oral tablet: 1 tab(s) orally once a day (27 Mar 2025 18:35)      REVIEW OF SYSTEMS:  All systems negative except as documented above.    OBJECTIVE:  ICU Vital Signs Last 24 Hrs  T(C): 37.7 (05 Apr 2025 16:00), Max: 37.8 (05 Apr 2025 00:00)  T(F): 99.9 (05 Apr 2025 16:00), Max: 100 (05 Apr 2025 00:00)  HR: 102 (05 Apr 2025 19:00) (90 - 126)  BP: --  BP(mean): --  ABP: 135/79 (05 Apr 2025 19:00) (118/69 - 164/98)  ABP(mean): 99 (05 Apr 2025 19:00) (85 - 123)  RR: 12 (05 Apr 2025 19:00) (12 - 18)  SpO2: 100% (05 Apr 2025 19:00) (97% - 100%)    O2 Parameters below as of 05 Apr 2025 07:00      O2 Concentration (%): 40      Mode: AC/ CMV (Assist Control/ Continuous Mandatory Ventilation), RR (machine): 12, TV (machine): 430, FiO2: 50, PEEP: 10, ITime: 1.25, MAP: 13, PC: 11, PIP: 22    04-04 @ 07:01  -  04-05 @ 07:00  --------------------------------------------------------  IN: 1245.4 mL / OUT: 1240 mL / NET: 5.4 mL    04-05 @ 07:01  -  04-05 @ 20:02  --------------------------------------------------------  IN: 1015 mL / OUT: 1400 mL / NET: -385 mL      CAPILLARY BLOOD GLUCOSE          PHYSICAL EXAM:  General: NAD, resting comfortably   HEENT: Sclera anicteric   Cardiovascular: RRR  Respiratory: normal respiratory effort, intubated  Skin: no rashes    HOSPITAL MEDICATIONS:  Standing Meds:  albuterol/ipratropium for Nebulization 3 milliLiter(s) Nebulizer every 6 hours  artificial  tears Solution 1 Drop(s) Both EYES every 2 hours  chlorhexidine 0.12% Liquid 15 milliLiter(s) Oral Mucosa every 12 hours  chlorhexidine 2% Cloths 1 Application(s) Topical <User Schedule>  hydrALAZINE 25 milliGRAM(s) Oral three times a day  isosorbide   dinitrate Tablet (ISORDIL) 10 milliGRAM(s) Oral three times a day  lacosamide Solution 300 milliGRAM(s) Oral two times a day  metoprolol tartrate 25 milliGRAM(s) Oral two times a day  pantoprazole  Injectable 40 milliGRAM(s) IV Push daily  piperacillin/tazobactam IVPB.. 3.375 Gram(s) IV Intermittent every 8 hours  propofol Infusion 10 MICROgram(s)/kG/Min IV Continuous <Continuous>  scopolamine 1 mG/72 Hr(s) Patch 1 Patch Transdermal every 72 hours  senna 2 Tablet(s) Oral at bedtime      PRN Meds:  acetaminophen     Tablet .. 650 milliGRAM(s) Oral every 6 hours PRN  glycopyrrolate Injectable 0.2 milliGRAM(s) IV Push every 6 hours PRN  polyethylene glycol 3350 17 Gram(s) Oral every 24 hours PRN  psyllium Powder 1 Packet(s) Oral three times a day PRN      LABS:                        7.1    12.64 )-----------( 326      ( 05 Apr 2025 17:45 )             22.5     Hgb Trend: 7.1<--, 7.5<--, 7.4<--, 7.2<--, 7.3<--  04-05    140  |  101  |  25[H]  ----------------------------<  138[H]  4.4   |  25  |  1.33[H]    Ca    9.0      05 Apr 2025 17:45  Phos  5.1     04-05  Mg     2.00     04-05    TPro  6.3  /  Alb  3.1[L]  /  TBili  0.2  /  DBili  <0.2  /  AST  75[H]  /  ALT  93[H]  /  AlkPhos  54  04-05    Creatinine Trend: 1.33<--, 1.25<--, 1.20<--, 1.18<--, 1.20<--, 1.21<--  PT/INR - ( 05 Apr 2025 17:45 )   PT: 11.8 sec;   INR: 1.02 ratio         PTT - ( 05 Apr 2025 17:45 )  PTT:29.0 sec  Urinalysis Basic - ( 05 Apr 2025 17:45 )    Color: x / Appearance: x / SG: x / pH: x  Gluc: 138 mg/dL / Ketone: x  / Bili: x / Urobili: x   Blood: x / Protein: x / Nitrite: x   Leuk Esterase: x / RBC: x / WBC x   Sq Epi: x / Non Sq Epi: x / Bacteria: x      Arterial Blood Gas:  04-05 @ 17:00  7.48/41/345/30/99.0/6.4  ABG lactate: --  Arterial Blood Gas:  04-05 @ 14:23  7.46/41/305/29/98.4/4.9  ABG lactate: --  Arterial Blood Gas:  04-05 @ 10:45  7.48/39/324/29/99.6/5.1  ABG lactate: --  Arterial Blood Gas:  04-05 @ 08:25  7.48/40/243/30/99.5/5.8  ABG lactate: --  Arterial Blood Gas:  04-05 @ 05:10  7.50/36/277/28/99.0/4.6  ABG lactate: --  Arterial Blood Gas:  04-05 @ 00:03  7.49/38/256/29/98.8/5.2  ABG lactate: --  Arterial Blood Gas:  04-04 @ 01:44  7.42/46/156/30/99.1/4.8  ABG lactate: --        MICROBIOLOGY:       RADIOLOGY:  [x] Reviewed and interpreted by me

## 2025-04-05 NOTE — CONSULT NOTE ADULT - ASSESSMENT
45F w/ HTN, hemorrhagic CVA in 2015, prev Trac/Peg now decannulated, known severe coronary anuerysm on previous LHC and CTA. She was at her doctor office for routine appt, had syncope and cardiac arrest s/p shock x 10 for VF rhythm. Taken to cath lab at [facility] emergently.     -now intubated sedated. wean pressor as tolerates   -check formal TTE ECHO in am   -cont heparin gtt for AC   -had CT at [facility] ER reportedly unremarkable   -assess neuro status off sedation   -if makes meaningful neurological recovery, will need EPS eval and CTS eval for coronary aneurysm ligation/bypass and MVR   -admit to MONICA Abbasi MD [facility]  Attending Interventional Cardiologist, Kamila-NS/[facility].   Avaliable on [employer] Conversion Team  
45 year old female with PMH CVA with residual R weakness, coronary artery aneurysms, saccular aneursys of L main, HTN who presented from [facility] after cardiac arrest at neurologist's office with unclear downtime and 20-25 minutes CPD. Patient was transferred to [facility] for C, no interventional performed. Patient without meaningful recovery, currently undergoing workup for organ donation. Pulmonology consulted for bronchoscopy as part of organ donation evaluation.     Plan:  - S/p bedside bronchoscopy 4/5/2025  - See separate note for details of bronchoscopy   - Follow up BAL fluid studies per organ donation protocol    Inpatient pulmonology to sign off, please reconsult with further questions. 
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 On 3/27, patient was at a doctor's appointment and collapsed in the office. EMS arrived in 20 minutes after call for EMS placed. Patient was wo pulse, CPR initiated and ROSC after 20-25minutes, sp 1x defib for Vfib. Total downtime could be up to 40-45minutes. Stabilized at [facility] and brought to [facility]. Hospitalization at [facility] complicated by ongoing leukocytosis, ITSH, transaminitis, florid UTI.    IMPRESSION:  Unresponsiveness following cardiac arrest. Exam consistent w diffuse cerebral anoxic injury. Differentials include toxic metabolic encephalopathy vs seizures.    Diag:  [] MRI brain wo contrast 5-7 days post arrest (04/1/25 - 04/03/25)  [] STAT repeat CTH wo contrast for acute change in neurologic status  [] neuron specific enolase 24-48 hours and 72 hours post arrest  ---1s NSE: arrest occurred on 3/27. Please collect NSE some time between 1230 3/28 and 1230 03/29  ---2nd NSE: Please collect NSE some time on 03/30  [] vEEG  [] Delirium w/u: Vit. b 12, folate, B6, thiamine, ETOH level, troponin level, LFT, TSH, Urine toxicology, ammonia  [] infectious work up: blood c/s, urine culture given UTI on UA    Meds:  [] Hold narcotics/sedatives as able    misc and manage:  [] keep eunatremic, MAP>65, normal pH level, and euthermic  [] general Neuro check q4h  [] Fall and aspiration precautions    *case and recs discussed w attending Dr. Alves

## 2025-04-05 NOTE — CHART NOTE - NSCHARTNOTEFT_GEN_A_CORE
Indication: Organ Donation     Operators: FELY Vega, Dr. Weaver    Pre-op Dx:     History: 44 y/o wheelchair bound female with PMHx of HTN, CVAx2 (2015) s/p trach and PEG (reversed)c with residual right sided weakness, large coronary artery aneurysms (2021) anterior and superior to LV with calcification/thrombus within wall compressing LA as well as proximal dilatation of RCA.    Preop medications:    Xray/CT Findings:    Findings:  Bronchoscope inserted through ETT. ETT noted to be in good position. No masses or narrowing noted. Airway evaluation revealed copious secretions suctioned throughout.   BAL performed R middle lobe and Lingula. Bronchoscope then withdrawn from ETT.    Specimens: Culture, Gram stain, Fungal Indication: Organ Donation     Operators: FELY Vega, Dr. Weaver    Pre-op Dx:     History: 44 y/o wheelchair bound female with PMHx of HTN, CVAx2 (2015) s/p trach and PEG (reversed)c with residual right sided weakness, large coronary artery aneurysms (2021) anterior and superior to LV with calcification/thrombus within wall compressing LA as well as proximal dilatation of RCA.    Preop medications:    Xray/CT Findings:    Findings:  Bronchoscope inserted through ETT. ETT noted to be in good position. No masses or narrowing noted. Airway evaluation revealed copious secretions suctioned throughout, easily suctioned and cleared.  BAL performed R middle lobe and Lingula. Bronchoscope then withdrawn from ETT.    Specimens: Culture, Gram stain, Fungal    Attending addendum:  At bedside for procedure. No obvious abnormalities seen on visual inspection. Moderate secretions seen that easily cleared with suctioning. Patient tolerated the procedure well without any immediate complications noted.  MCKINLEY Weaver, DO, FCCP

## 2025-04-05 NOTE — PROGRESS NOTE ADULT - SUBJECTIVE AND OBJECTIVE BOX
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 2021 TTE: normal biventricular function, [facility] 2021. showed large saccular aneurysm of proximal LM with LAD and LCx coming off confluence and medium localized aneurysm of RCA. Per CT surgery, notCABG or transplant candidate. 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   presents to [facility] from routine visit to neurologist (Dr Ortiz) after syncopal event. When EMS arrived she had no pulse and CPR was initiated, ROSC after 20-25minutes. Unclear downtime prior to initiation of CPR but EMS arrived 20minutes after they were called. Total downtime could be up to 40-45minutes. She was defibrillated for Vfib. Stabilized at [facility] and brought to [facility]. She had a LHC on admission, no intervention performed. Transferred to CCU after LHC without intervention (no intervention).     SUBJECTIVE / OVERNIGHT EVENTS:    Pt seen in AM at bedside, intubated, on sedation. Unable to participate in ROS. (+) gag reflex; otherwise, no brainstem reflexes elicited/mental status unchanged.        ALLERGIES: Allergies    No Known Allergies    Intolerances        MEDICATIONS:  MEDICATIONS  (STANDING):  albuterol/ipratropium for Nebulization 3 milliLiter(s) Nebulizer every 6 hours  artificial  tears Solution 1 Drop(s) Both EYES every 2 hours  chlorhexidine 0.12% Liquid 15 milliLiter(s) Oral Mucosa every 12 hours  chlorhexidine 2% Cloths 1 Application(s) Topical <User Schedule>  hydrALAZINE 25 milliGRAM(s) Oral three times a day  isosorbide   dinitrate Tablet (ISORDIL) 10 milliGRAM(s) Oral three times a day  lacosamide Solution 300 milliGRAM(s) Oral two times a day  metoprolol tartrate 25 milliGRAM(s) Oral two times a day  pantoprazole  Injectable 40 milliGRAM(s) IV Push daily  piperacillin/tazobactam IVPB.. 3.375 Gram(s) IV Intermittent every 8 hours  propofol Infusion 10 MICROgram(s)/kG/Min (3.9 mL/Hr) IV Continuous <Continuous>  scopolamine 1 mG/72 Hr(s) Patch 1 Patch Transdermal every 72 hours  senna 2 Tablet(s) Oral at bedtime    MEDICATIONS  (PRN):  acetaminophen     Tablet .. 650 milliGRAM(s) Oral every 6 hours PRN Temp greater or equal to 38C (100.4F)  glycopyrrolate Injectable 0.2 milliGRAM(s) IV Push every 6 hours PRN Secretions  polyethylene glycol 3350 17 Gram(s) Oral every 24 hours PRN Constipation  psyllium Powder 1 Packet(s) Oral three times a day PRN Constipation        OBJECTIVE:  ICU Vital Signs Last 24 Hrs  T(C): 37.5 (05 Apr 2025 04:00), Max: 37.8 (05 Apr 2025 00:00)  T(F): 99.5 (05 Apr 2025 04:00), Max: 100 (05 Apr 2025 00:00)  HR: 112 (05 Apr 2025 07:13) (90 - 115)  BP: --  BP(mean): --  ABP: 134/78 (05 Apr 2025 07:00) (129/72 - 164/98)  ABP(mean): 95 (05 Apr 2025 07:00) (91 - 123)  RR: 14 (05 Apr 2025 07:00) (14 - 18)  SpO2: 97% (05 Apr 2025 07:13) (97% - 100%)    O2 Parameters below as of 05 Apr 2025 07:00      O2 Concentration (%): 40      Mode: AC/ CMV (Assist Control/ Continuous Mandatory Ventilation)  RR (machine): 14  TV (machine): 460  FiO2: 40  PEEP: 10  ITime: 0.72  MAP: 13  PIP: 24    I&O's Summary    04 Apr 2025 07:01  -  05 Apr 2025 07:00  --------------------------------------------------------  IN: 1235.4 mL / OUT: 1240 mL / NET: -4.6 mL      Daily     Daily       PHYSICAL EXAMINATION:  General: Comfortable, no acute distress, cooperative with exam.  HEENT: Moist mucous membranes.  Respiratory: CTAB, normal respiratory effort, no coughing, wheezes, crackles, or rales.  CV: RRR, S1S2, no murmurs, rubs or gallops. No JVD. Distal pulses intact.  Abdominal: Soft, nontender, nondistended, no rebound or guarding, normal bowel sounds.  Neurology: AOx3, no focal neuro defects, DARDEN x 4.  Extremities: No pitting edema, + Peripheral pulses.          LABS:  ABG - ( 05 Apr 2025 05:10 )  pH, Arterial: 7.50  pH, Blood: x     /  pCO2: 36    /  pO2: 277   / HCO3: 28    / Base Excess: 4.6   /  SaO2: 99.0                                    7.4    13.82 )-----------( 294      ( 05 Apr 2025 05:10 )             22.8     04-05    137  |  101  |  25[H]  ----------------------------<  129[H]  4.3   |  23  |  1.20    Ca    8.9      05 Apr 2025 05:10  Phos  4.2     04-05  Mg     2.00     04-05    TPro  6.2  /  Alb  3.0[L]  /  TBili  0.2  /  DBili  <0.2  /  AST  80[H]  /  ALT  103[H]  /  AlkPhos  54  04-05    LIVER FUNCTIONS - ( 05 Apr 2025 05:10 )  Alb: 3.0 g/dL / Pro: 6.2 g/dL / ALK PHOS: 54 U/L / ALT: 103 U/L / AST: 80 U/L / GGT: x           PT/INR - ( 05 Apr 2025 05:10 )   PT: 11.9 sec;   INR: 1.03 ratio         PTT - ( 05 Apr 2025 05:10 )  PTT:27.9 sec  CKMB Units: 2.0 ng/mL (04-05 @ 05:10)    CARDIAC MARKERS ( 05 Apr 2025 05:10 )  x     / x     / x     / x     / 2.0 ng/mL      Urinalysis Basic - ( 05 Apr 2025 05:10 )    Color: x / Appearance: x / SG: x / pH: x  Gluc: 129 mg/dL / Ketone: x  / Bili: x / Urobili: x   Blood: x / Protein: x / Nitrite: x   Leuk Esterase: x / RBC: x / WBC x   Sq Epi: x / Non Sq Epi: x / Bacteria: x      Assessment and Plan:   · Assessment	  44 y/o F with PMH of HTN, 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 [facility] on admission re-demonstrating aneurysmal dilation w/ no intervention performed. Currently intubated, no on sedation, no response to noxious stimuli, no mental status, no purposeful movement, questionable gag reflex, otherwise no other brain stem reflexes. Hospital course c/b tonic/clonic seizures possibly 2.2 to anoxic brain injury. Poor prognosis for recovery, ongoing GOCs w/ family.     NEURO  -Baseline: alert and oriented x3, wheelchair bound    ## Diffuse cerebral anoxic injury v seizures   - Off sedation (propofol gtt since 3/28 AM) -> restarted prop 04/02.  - Neuro consulted: appreciate recs   > CTH (3/30): w/ diffuse loss of gray-white matter c/w global anoxic brain injury & diffuse enhancement of hemispheric sulci and basal cisterns c/w elevate supratentorial pressures w/ no herniation or midline shifts.   > neuron specific enolase (3/28, 3/29, 3/30; 24, 48, 72 hours post-arrest): 68, 160, 402  > Video EEG (3/30): diffuse cerebral dysfunction; continuous right frontal, periodic spiky muscle artifact possibly explaining facial myoclonus   > Delium w/u (Vit. b 12, folate, B6, thiamine, ETOH level, troponin level, LFT, TSH, Urine toxicology): non-revealing.  > no role for MRI brain wo contrast per Neuro  > General Neuro check q12  -- Overnight 3/29 with whole body clonic myogenic/movement with high pressure on vent.    > s/p Ativan 4mg IVP x 1, Vimpat 200mg IVP x 1  - c/w Vimpat 300 mg BID   - Consideration for palliative extubation on 04/02.     PULM  ## Respiratory failure due to cardiac arrest  - CXR daily  - Clear lungs.    CARDS  TTE (03/28):   > LVSF w/ normal EF 55 to 60 %. Hypokinesis of the basal inferoseptal, basal inferolateral and basal inferior walls. Mild (grade 1) LV diastolic dysfunction.  - ProBNP 1268     ## Vfib Arrest  - Last [facility] 2021: Mild CAD; Aneurysms in LM, LAD, CX, RCA.  - Trops 2500, CKMB 160 CKMB index 4; EKG w/ SRIKANTH in inferior leads  - [facility] Diagnostic Conclusions (3/27): Ectatic LCA, difficulty to opacify LAD and Cx. Severe LV systolic dysfunction w/ moderate-severe MR.  - HOLD (04/02) Eliquis 5 BID (previously on for LV Thrombus) per family request given menstrual bleeding for patient comfort    GI  ## OGT in place  - c/w tube feeds, jevity  - Nutrition c/s'd: appreciate recs      ## TISH   - creatinine 1.14 in 2021  - sCr elevated on admission; now DOWNTRENDING.    ENDO  - HgA1c 5.3  - TSH 0.73    ID  ## Persistent fevers,  - Persistent Leukocytosis, though downtrending.  - Procalcitonin 1.68  - s/p 10x day course of empiric Zosyn   - BCx: NGTD  - UCx: NGTD    HEME  #History of coronary thrombus   - HOLD (04/02) Eliquis 5 BID (previously on for LV Thrombus) per family request given menstrual bleeding for patient comfort    PALLIATIVE  #GOC  - Active GOCs w/ family regarding prognosis (brain death) and further interventions.  - CODE: Full code

## 2025-04-06 NOTE — CHART NOTE - NSCHARTNOTEFT_GEN_A_CORE
Patient's ETT became dislodged with patient care. Patient was losing her tidal volumes. Anesthesia was called for re-intubation vs. repositioning the tube. Respiratory, RN, NP, MD at bedside. Anesthesia used fiberoptic/bronchoscopy to reposition the tube. Patient remained hemodynamically stable throughout this process. She is now getting her tidal volumes. CXR to confirm placement.

## 2025-04-06 NOTE — PROGRESS NOTE ADULT - SUBJECTIVE AND OBJECTIVE BOX
FOLLOW UP:  s/p Cardiac Arrest    SUBJECTIVE/OBSERVATIONS:  Patient seen and examined. Patient remains intubated and sedated.    Vital Signs Last 24 Hrs  T(C): 37.1 (06 Apr 2025 04:00), Max: 37.7 (05 Apr 2025 16:00)  T(F): 98.8 (06 Apr 2025 04:00), Max: 99.9 (05 Apr 2025 16:00)  HR: 105 (06 Apr 2025 07:00) (90 - 126)  RR: 12 (06 Apr 2025 07:00) (12 - 14)  SpO2: 100% (06 Apr 2025 07:00) (97% - 100%)    Parameters below as of 06 Apr 2025 07:00  Patient On (Oxygen Delivery Method): ventilator    O2 Concentration (%): 50  I&O's Summary    05 Apr 2025 07:01  -  06 Apr 2025 07:00  --------------------------------------------------------  IN: 1737.8 mL / OUT: 3700 mL / NET: -1962.2 mL        MEDICATIONS:  hydrALAZINE 25 milliGRAM(s) Oral three times a day  isosorbide   dinitrate Tablet (ISORDIL) 10 milliGRAM(s) Oral three times a day  metoprolol tartrate 25 milliGRAM(s) Oral two times a day  piperacillin/tazobactam IVPB.. 3.375 Gram(s) IV Intermittent every 8 hours  albuterol/ipratropium for Nebulization 3 milliLiter(s) Nebulizer every 6 hours  acetaminophen     Tablet .. 650 milliGRAM(s) Oral every 6 hours PRN  lacosamide Solution 300 milliGRAM(s) Oral two times a day  propofol Infusion 10 MICROgram(s)/kG/Min IV Continuous <Continuous>  scopolamine 1 mG/72 Hr(s) Patch 1 Patch Transdermal every 72 hours  glycopyrrolate Injectable 0.2 milliGRAM(s) IV Push every 6 hours PRN  pantoprazole  Injectable 40 milliGRAM(s) IV Push daily  polyethylene glycol 3350 17 Gram(s) Oral every 24 hours PRN  psyllium Powder 1 Packet(s) Oral three times a day PRN  senna 2 Tablet(s) Oral at bedtime  artificial  tears Solution 1 Drop(s) Both EYES every 2 hours  chlorhexidine 0.12% Liquid 15 milliLiter(s) Oral Mucosa every 12 hours  chlorhexidine 2% Cloths 1 Application(s) Topical <User Schedule>    REVIEW OF SYSTEMS:  Unable to assess    PHYSICAL EXAM:  General: critical and unstable  Cardiovascular: Normal S1 S2  Respiratory: intubated  Gastrointestinal:  soft  Skin: warm and dry,   Extremities: contracted      LABS:	 	    CBC Full  -  ( 06 Apr 2025 00:30 )  WBC Count : 13.53 K/uL  Hemoglobin : 7.2 g/dL  Hematocrit : 23.0 %  Platelet Count - Automated : 326 K/uL  Mean Cell Volume : 82.1 fL  Mean Cell Hemoglobin : 25.7 pg  Mean Cell Hemoglobin Concentration : 31.3 g/dL  Auto Neutrophil # : x  Auto Lymphocyte # : x  Auto Monocyte # : x  Auto Eosinophil # : x  Auto Basophil # : x  Auto Neutrophil % : x  Auto Lymphocyte % : x  Auto Monocyte % : x  Auto Eosinophil % : x  Auto Basophil % : x    04-06    138  |  97[L]  |  25[H]  ----------------------------<  124[H]  4.0   |  25  |  1.30  04-05    140  |  101  |  25[H]  ----------------------------<  138[H]  4.4   |  25  |  1.33[H]    Ca    9.3      06 Apr 2025 00:30  Ca    9.0      05 Apr 2025 17:45  Phos  4.9     04-06  Phos  5.1     04-05  Mg     2.00     04-06  Mg     2.00     04-05    TPro  7.0  /  Alb  3.8  /  TBili  0.4  /  DBili  <0.2  /  AST  80[H]  /  ALT  100[H]  /  AlkPhos  55  04-06  TPro  6.3  /  Alb  3.1[L]  /  TBili  0.2  /  DBili  <0.2  /  AST  75[H]  /  ALT  93[H]  /  AlkPhos  54  04-05            STUDIES & IMAGING: (EEG, CT, MR, U/S, TTE/SANDRO):  CT Head No Cont:  (28 Mar 2025 09:18)      FINDINGS:  Right pontine hypodensity on image 14 of series 10 in the patient's known   area of cavernoma versus encephalomalacia.  No acute intracranial hemorrhage. There is no compelling evidence for an   acute transcortical infarction. There is no evidence of mass, mass   effect, midline shift or extra-axial fluid collection. The lateral   ventricles and cortical sulci are age-appropriate in size and   configuration. The orbits, mastoid air cells and visualized paranasal   sinuses are unremarkable. The calvarium is intact. Consider MRI as   clinically warranted.    IMPRESSION: No evidence of an acute transcortical infarction or   hemorrhage.      EEG 3/29 17h:  EEG Classification / Summary:  Abnormal EEG study  In the context of substantial movement artifacts and poor video:  Right facial / jaw clonus near 1-1.5hz unremitting.  Difficult to discern any spike component on EEG.  Associated movement artifacts superimposed on myogenic artifacts at times with stimulation/arousal.  In addition, substantial eye motion artifacts, with nystagmus noted on bedside exam per resident.    Suspect diffuse attenuation, some higher amplitude activity may  be present over the left frontal region.    -----------------------------------------------------------------------------------------------------    Clinical Impression:  Heavily artifactual recording.  Suspect severe diffuse/multi-focal cerebral dysfunction, worse in the right hemisphere.  Right sided clonic jaw/facial movements may represent EPC/focal motor seizure activity, with limited EEG.  Associated nystagmus clinically, with associated quasi-rhythmic artifacts on EEG.   Clinical correlation required in the context of prior pontine lesion, known nystagmus/gaze abnormalities, and recent anoxia.

## 2025-04-06 NOTE — PROGRESS NOTE ADULT - SUBJECTIVE AND OBJECTIVE BOX
LIZZIE [id number] CCU 02 ([facility] CCU)  45yFemale  ST elevation myocardial infarction (STEMI)      Anesthesia called for emergency intubation/re-intubation.  On arrival, pt sedated/unresponsive, not in respiratory distress. +VSS, ETT to vent. +Airway leak and ETT level moved from 23cm to 19 as per RN. , SpO2 @100%. Airway/ETT placement confirmed via fiberoptic by Dr Mack, cuff deflated and advanced tube to 23cm level, cuff reinflated and secured with ETT rosas. SPO2 @100% throughout. ETT advanced without trauma, No complications. Controlled ventilations.   04-06-25 @ 10:49    Sarah Brown CRNA

## 2025-04-07 NOTE — PROGRESS NOTE ADULT - ASSESSMENT
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 [facility] on admission re-demonstrating aneurysmal dilation w/ no intervention performed. Currently intubated, no on sedation, no response to noxious stimuli, no mental status, no purposeful movement, questionable gag reflex, otherwise no other brain stem reflexes. Hospital course c/b tonic/clonic seizures possibly 2.2 to anoxic brain injury. Poor prognosis for recovery, ongoing GOCs w/ family.     NEURO  -Baseline: alert and oriented x3, wheelchair bound    ## Diffuse cerebral anoxic injury v seizures   - On prop since 04/02. Previously, off sedation.  - Neuro consulted: appreciate recs   > CTH (3/30): w/ diffuse loss of gray-white matter c/w global anoxic brain injury & diffuse enhancement of hemispheric sulci and basal cisterns c/w elevate supratentorial pressures.   > neuron specific enolase (3/28, 3/29, 3/30; 24, 48, 72 hours post-arrest): 68, 160, 402  > Video EEG (3/30): Severe diffuse/multi-focal cerebral dysfunction, worse in the right hemisphere. Right sided clonic jaw/facial movements may represent EPC/focal motor seizure activity, with limited EEG.  > Delium w/u (Vit. b 12, folate, B6, thiamine, ETOH level, troponin level, LFT, TSH, Urine toxicology): non-revealing.  > General Neuro check q12  - Patient wishes carried out by family, patient now a DNR  - Goals of care established  - Likely palliative extubation on 04/08.    PULM  ## Respiratory failure due to cardiac arrest  - CXR daily  - Clear lungs.    CARDS  TTE (03/28):   > LVSF w/ normal EF 55 to 60 %. Hypokinesis of the basal inferoseptal, basal inferolateral and basal inferior walls. Mild (grade 1) LV diastolic dysfunction.  - ProBNP 1268     ## Vfib Arrest  - Last [facility] 2021: Mild CAD; Aneurysms in LM, LAD, CX, RCA.  - Trops 2500, CKMB 160 CKMB index 4; EKG w/ SRIKANTH in inferior leads  - [facility] Diagnostic Conclusions (3/27): Ectatic LCA, difficulty to opacify LAD and Cx. Severe LV systolic dysfunction w/ moderate-severe MR.  - HOLD (04/02) Eliquis 5 BID (previously on for LV Thrombus) per family request given menstrual bleeding for patient comfort    GI  ## OGT in place  - c/w tube feeds, jevity  - Nutrition c/s'd: appreciate recs      ## TISH   - creatinine 1.14 in 2021  - sCr elevated on admission; now DOWNTRENDING.    ENDO  - HgA1c 5.3  - TSH 0.73    ID  ## Persistent fevers,  - Persistent Leukocytosis, though downtrending.  - Procalcitonin 1.68  - s/p 10x day course of empiric Zosyn   - restarted on Zosyn 04/04  - BCx: NGTD  - UCx: NGTD    HEME  #History of coronary thrombus   - HOLD (04/02) Eliquis 5 BID (previously on for LV Thrombus) per family request given menstrual bleeding for patient comfort    PALLIATIVE  #GOC  - Active GOCs w/ family regarding prognosis (brain death) and further interventions.  - Family is aware of grave prognosis with no meaningful recovery and opting to do a palliative extubation  - CODE: Full code    MEDICAL EXAMINER CALLED, the  name is Milymed: THIS IS NOT A REPORTABLE CASE  Case [id number]

## 2025-04-07 NOTE — CHART NOTE - NSCHARTNOTEFT_GEN_A_CORE
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 2021 TTE: normal biventricular function, [facility] 2021. showed large saccular aneurysm of proximal LM with LAD and LCx coming off confluence and medium localized aneurysm of RCA."     DIET PRESCRIPTION:  Diet, NPO with Tube Feed:   Tube Feeding Modality: Orogastric  Jevity 1.5 [place] (JEVITY1.5RTH)  Total Volume for 24 Hours (mL): 960  Continuous  Starting Tube Feed Rate {mL per Hour}: 10  Increase Tube Feed Rate by (mL): 10     Every 4 hours  Until Goal Tube Feed Rate (mL per Hour): 40  Tube Feed Duration (in Hours): 24  Tube Feed Start Time: 18:00  Free Water Flush (04-07-25 @ 10:04)      NUTRITION COURSE:  Patient was previously receiving Jevity 1.5 @ 40 mL/hr x 24 hrs via OGT with good tolerance. Order discontinued yesterday 04/06, re-ordered this morning 4/7. Visited patient at bedside today, patient intubated/sedated. Noted propofol running at 3.9mL/hr which provides 103kcal/day. EN not running at time of visit. Per medical team, pending family visits prior to terminal extubation. Last BM noted 4/3 per RN flowsheets.       EN: Current nutrition prescription with propofol provides 960mL formula, 1543kcal, 61.4gm protein, 730mL free water. This meets 24kcal/kg, 0.9gm protein/kg @ dosing/current/ideal weight 65kg.       PERTINENT MEDICATIONS:  MEDICATIONS  (STANDING):  albuterol/ipratropium for Nebulization 3 milliLiter(s) Nebulizer every 6 hours  artificial  tears Solution 1 Drop(s) Both EYES every 2 hours  chlorhexidine 0.12% Liquid 15 milliLiter(s) Oral Mucosa every 12 hours  chlorhexidine 2% Cloths 1 Application(s) Topical <User Schedule>  hydrALAZINE 25 milliGRAM(s) Oral three times a day  isosorbide   dinitrate Tablet (ISORDIL) 10 milliGRAM(s) Oral three times a day  lacosamide Solution 300 milliGRAM(s) Oral two times a day  metoprolol tartrate 25 milliGRAM(s) Oral two times a day  pantoprazole  Injectable 40 milliGRAM(s) IV Push daily  piperacillin/tazobactam IVPB.. 3.375 Gram(s) IV Intermittent every 8 hours  propofol Infusion 10 MICROgram(s)/kG/Min (3.9 mL/Hr) IV Continuous <Continuous>  scopolamine 1 mG/72 Hr(s) Patch 1 Patch Transdermal every 72 hours  senna 2 Tablet(s) Oral at bedtime    MEDICATIONS  (PRN):  acetaminophen     Tablet .. 650 milliGRAM(s) Oral every 6 hours PRN Temp greater or equal to 38C (100.4F)  glycopyrrolate Injectable 0.2 milliGRAM(s) IV Push every 6 hours PRN Secretions  polyethylene glycol 3350 17 Gram(s) Oral every 24 hours PRN Constipation  psyllium Powder 1 Packet(s) Oral three times a day PRN Constipation    [X] Relevant notes on medications: bowel regimen noted    PERTINENT LABS:  04-07 Na137 mmol/L Glu 116 mg/dL[H] K+ 4.0 mmol/L Cr  1.52 mg/dL[H] BUN 28 mg/dL[H] 04-07 Phos 5.6 mg/dL[H] 04-07 Alb 3.4 g/dL 03-29 Chol 80 mg/dL LDL --    HDL 46 mg/dL[L] Trig 59 mg/dL     A1C with Estimated Average Glucose Result: 5.3 % (03-28-25 @ 04:31)    [X] Relevant notes on labs: elevated phosphorus levels noted    ANTHROPOMETRICS:  Weight: Height (cm): 165.1 (03-27 @ 18:31)  Weight (kg): 65 (03-27 @ 18:31); 74.5kg - bed scale on 4/7  BMI (kg/m2): 23.8 (03-27 @ 18:31)  Weight Assessment: weight fluctuations likely 2/2 fluid shifts and bed scale discrepancies     PHYSICAL ASSESSMENT, per flowsheets:   Edema: 1+ generalized edema documented per flowsheets  Pressure Injury: no pressure injuries documented per flowsheets    ESTIMATED NEEDS:  [X] No change since previous assessment, based on ideal body weight 125 lbs / 56.6kg  4913-1048 kcal daily @ 25-30kcal/kg,  56.6-84.9gm protein daily @ 1-1.5gm/kg       PREVIOUS NUTRITION DX:  [X] Inadequate Protein Energy Intake  Nutrition Diagnosis is [X] ongoing   New Nutrition Diagnosis: [X] not applicable     EDUCATION:  [X] Not applicable secondary to pt intubated/sedated    RECOMMENDATIONS/INTERVENTIONS:  1) Nutrition interventions to remain in line with GOC   2) Continue NPO status as ordered. Consider trickle feeds of Jevity 1.5 for comfort or continue current EN order of Jevity 1.5 @ 40mL/hr x 24 hours  3) RD to remain available PRN  [X] Current medical condition precludes nutrition intervention at this time.     MONITORING & EVALUATING:  EN tolerance, nutrition related lab values, weight trends, BMs/GI distress, hydration status, skin integrity.    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 2021 TTE: normal biventricular function, [facility] 2021. showed large saccular aneurysm of proximal LM with LAD and LCx coming off confluence and medium localized aneurysm of RCA." Pending GOC per chart.     DIET PRESCRIPTION:  Diet, NPO with Tube Feed:   Tube Feeding Modality: Orogastric  Jevity 1.5 [place] (JEVITY1.5RTH)  Total Volume for 24 Hours (mL): 960  Continuous  Starting Tube Feed Rate {mL per Hour}: 10  Increase Tube Feed Rate by (mL): 10     Every 4 hours  Until Goal Tube Feed Rate (mL per Hour): 40  Tube Feed Duration (in Hours): 24  Tube Feed Start Time: 18:00  Free Water Flush (04-07-25 @ 10:04)      NUTRITION COURSE:  Patient was previously NPO status, EN regimen re-ordered this morning 4/7. Per RN flowsheets, patient received 920mL 4/6, 920mL 4/5, 960mL 4/4, 920mL 4/3, 1020mL 4/2, 1080mL 4/1, 480mL 3/31 of tube feed. Patient received overall goal volume since last assessment. Visited patient at bedside today, patient intubated/sedated. Noted propofol running at 3.9mL/hr which provides 103kcal/day. EN not running at time of visit. Per medical team and per chart, pending likely palliative extubation 4/8. Last BM noted 4/3 per RN flowsheets.     EN: Current nutrition prescription with propofol provides 960mL formula, 1543kcal, 61.4gm protein, 730mL free water.       PERTINENT MEDICATIONS:  MEDICATIONS  (STANDING):  albuterol/ipratropium for Nebulization 3 milliLiter(s) Nebulizer every 6 hours  artificial  tears Solution 1 Drop(s) Both EYES every 2 hours  chlorhexidine 0.12% Liquid 15 milliLiter(s) Oral Mucosa every 12 hours  chlorhexidine 2% Cloths 1 Application(s) Topical <User Schedule>  hydrALAZINE 25 milliGRAM(s) Oral three times a day  isosorbide   dinitrate Tablet (ISORDIL) 10 milliGRAM(s) Oral three times a day  lacosamide Solution 300 milliGRAM(s) Oral two times a day  metoprolol tartrate 25 milliGRAM(s) Oral two times a day  pantoprazole  Injectable 40 milliGRAM(s) IV Push daily  piperacillin/tazobactam IVPB.. 3.375 Gram(s) IV Intermittent every 8 hours  propofol Infusion 10 MICROgram(s)/kG/Min (3.9 mL/Hr) IV Continuous <Continuous>  scopolamine 1 mG/72 Hr(s) Patch 1 Patch Transdermal every 72 hours  senna 2 Tablet(s) Oral at bedtime    MEDICATIONS  (PRN):  acetaminophen     Tablet .. 650 milliGRAM(s) Oral every 6 hours PRN Temp greater or equal to 38C (100.4F)  glycopyrrolate Injectable 0.2 milliGRAM(s) IV Push every 6 hours PRN Secretions  polyethylene glycol 3350 17 Gram(s) Oral every 24 hours PRN Constipation  psyllium Powder 1 Packet(s) Oral three times a day PRN Constipation    [X] Relevant notes on medications: bowel regimen noted    PERTINENT LABS:  04-07 Na137 mmol/L Glu 116 mg/dL[H] K+ 4.0 mmol/L Cr  1.52 mg/dL[H] BUN 28 mg/dL[H] 04-07 Phos 5.6 mg/dL[H] 04-07 Alb 3.4 g/dL 03-29 Chol 80 mg/dL LDL --    HDL 46 mg/dL[L] Trig 59 mg/dL     A1C with Estimated Average Glucose Result: 5.3 % (03-28-25 @ 04:31)    [X] Relevant notes on labs: elevated phosphorus levels noted    ANTHROPOMETRICS:  Weight: Height (cm): 165.1 (03-27 @ 18:31)  Weight (kg): 65 (03-27 @ 18:31); 74.5kg - bed scale on 4/7; 73.8kg on 3/30; 72.4kg on 3/28  BMI (kg/m2): 23.8 (03-27 @ 18:31)  Weight Assessment: weight fluctuations likely 2/2 fluid shifts and bed scale discrepancies     PHYSICAL ASSESSMENT, per flowsheets:   Edema: 1+ generalized edema documented per flowsheets  Pressure Injury: no pressure injuries documented per flowsheets    ESTIMATED NEEDS:  [X] No change since previous assessment, based on ideal body weight 125 lbs / 56.6kg  3626-8466 kcal daily @ 25-30kcal/kg,  56.6-84.9gm protein daily @ 1-1.5gm/kg       PREVIOUS NUTRITION DX:  [X] Inadequate Protein Energy Intake  Nutrition Diagnosis is [X] ongoing   New Nutrition Diagnosis: [X] not applicable     EDUCATION:  [X] Not applicable secondary to pt intubated/sedated    RECOMMENDATIONS/INTERVENTIONS:  1) Nutrition interventions to remain in line with GOC   2) Continue NPO status as ordered. Consider trickle feeds of Jevity 1.5 for comfort or continue current EN order of Jevity 1.5 @ 40mL/hr x 24 hours  3) RD to remain available PRN    MONITORING & EVALUATING:  EN tolerance, nutrition related lab values, weight trends, BMs/GI distress, hydration status, skin integrity.    Sabiha Alcantara MS, RD | available on Teams | RD office #: [phone]

## 2025-04-07 NOTE — PROGRESS NOTE ADULT - SUBJECTIVE AND OBJECTIVE BOX
--------------------------  Des Rdz MD  Internal Medicine   PGY-1  --------------------------  44 y/o wheelchair bound female with PMHx of HTN, CVAx2 () s/p trach and PEG ()c with residual right sided weakness, large coronary artery aneurysms () anterior and superior to LV with calcification/thrombus within wall compressing LA as well as proximal dilatation of RCA.  TTE: normal biventricular function, [facility] . showed large saccular aneurysm of proximal LM with LAD and LCx coming off confluence and medium localized aneurysm of RCA. Per CT surgery, notCABG or transplant candidate. 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 When EMS arrived she had no pulse and CPR was initiated, ROSC after 20-25minutes. Unclear downtime prior to initiation of CPR but EMS arrived 20minutes after they were called. Total downtime could be up to 40-45minutes. She was defibrillated for Vfib. Stabilized at [facility] and brought to [facility]. She had a LHC on admission, no intervention performed. Transferred to CCU after LHC without intervention (no intervention).     SUBJECTIVE / OVERNIGHT EVENTS:    Pt seen in AM at bedside, intubated, on sedation. Unable to participate in ROS. (+) gag reflex; otherwise, no brainstem reflexes elicited/mental status unchanged.    MEDICATIONS  (STANDING):  albuterol/ipratropium for Nebulization 3 milliLiter(s) Nebulizer every 6 hours  artificial  tears Solution 1 Drop(s) Both EYES every 2 hours  chlorhexidine 0.12% Liquid 15 milliLiter(s) Oral Mucosa every 12 hours  chlorhexidine 2% Cloths 1 Application(s) Topical <User Schedule>  hydrALAZINE 25 milliGRAM(s) Oral three times a day  isosorbide   dinitrate Tablet (ISORDIL) 10 milliGRAM(s) Oral three times a day  lacosamide Solution 300 milliGRAM(s) Oral two times a day  metoprolol tartrate 25 milliGRAM(s) Oral two times a day  pantoprazole  Injectable 40 milliGRAM(s) IV Push daily  piperacillin/tazobactam IVPB.. 3.375 Gram(s) IV Intermittent every 8 hours  propofol Infusion 10 MICROgram(s)/kG/Min (3.9 mL/Hr) IV Continuous <Continuous>  scopolamine 1 mG/72 Hr(s) Patch 1 Patch Transdermal every 72 hours  senna 2 Tablet(s) Oral at bedtime    MEDICATIONS  (PRN):  acetaminophen     Tablet .. 650 milliGRAM(s) Oral every 6 hours PRN Temp greater or equal to 38C (100.4F)  glycopyrrolate Injectable 0.2 milliGRAM(s) IV Push every 6 hours PRN Secretions  polyethylene glycol 3350 17 Gram(s) Oral every 24 hours PRN Constipation  psyllium Powder 1 Packet(s) Oral three times a day PRN Constipation    CAPILLARY BLOOD GLUCOSE    I&O's Summary    2025 07:01  -  2025 07:00  --------------------------------------------------------  IN: 937.6 mL / OUT: 1320 mL / NET: -382.4 mL    PHYSICAL EXAM:  Vital Signs Last 24 Hrs  T(C): 37.6 (2025 04:00), Max: 37.8 (2025 20:00)  T(F): 99.7 (2025 04:00), Max: 100 (2025 20:00)  HR: 108 (2025 07:02) (96 - 143)  RR: 12 (2025 06:00) (12 - 13)  SpO2: 100% (2025 07:02) (100% - 100%)    Parameters below as of 2025 07:02  Patient On (Oxygen Delivery Method): ventilator    HEAD: Atraumatic, Normocephalic  EYES: EOMI, pinpoint pupils, non reactive, conjunctiva and sclera clear. No corneal reflex  ENT: Moist mucous membranes  NECK: Well healed tracheostomy scar   CHEST/LUNG: Clear to auscultation bilaterally; No rales, rhonchi, wheezing, or rubs. Unlabored respirations  HEART: Regular rate and rhythm; No murmurs, rubs, or gallops  ABDOMEN: BSx4; Soft, nontender, nondistended  EXTREMITIES:  2+ Peripheral Pulses, brisk capillary refill. No clubbing, cyanosis, or edema  NERVOUS SYSTEM:  Unresponsive to painful stimuli. (+) gag reflex, (-) no corneal reflex. (+) Facial myoclonus of chin/mouth  SKIN: No rashes or lesions    LABS:                        7.1    11.94 )-----------( 369      ( 2025 06:45 )             22.8     04-07    138  |  97[L]  |  27[H]  ----------------------------<  117[H]  4.0   |  22  |  1.51[H]    Ca    9.3      2025 00:00  Phos  5.5     04-07  Mg     2.20     04-07    TPro  6.8  /  Alb  3.5  /  TBili  0.4  /  DBili  <0.2  /  AST  63[H]  /  ALT  78[H]  /  AlkPhos  55  04-07    PT/INR - ( 2025 06:45 )   PT: 13.3 sec;   INR: 1.15 ratio    PTT - ( 2025 06:45 )  PTT:29.7 sec  CARDIAC MARKERS ( 2025 06:18 )  x     / x     / x     / x     / 1.4 ng/mL  CARDIAC MARKERS ( 2025 00:30 )  x     / x     / x     / x     / 1.5 ng/mL    Urinalysis Basic - ( 2025 00:15 )  Color: Yellow / Appearance: Clear / S.019 / pH: x  Gluc: x / Ketone: Negative mg/dL  / Bili: Negative / Urobili: 1.0 mg/dL   Blood: x / Protein: 30 mg/dL / Nitrite: Negative   Leuk Esterase: Negative / RBC: 4 /HPF / WBC 0 /HPF   Sq Epi: x / Non Sq Epi: 1 /HPF / Bacteria: Negative /HPF    Culture - Fungal, Bronchial (collected 2025 12:10)  Source: Bronchial Bronchial Lavage  Preliminary Report (2025 10:51):    No growth    Culture - Bronchial (collected 2025 12:10)  Source: Bronchial Bronchial Lavage  Gram Stain (2025 23:11):    Few polymorphonuclear leukocytes per low power field    No squamous epithelial cells per low power field    No organisms seen per oil power field  Final Report (2025 22:54):    Commensal carrol consistent with body site    RADIOLOGY & ADDITIONAL TESTS:  Results Reviewed: X  Imaging Personally Reviewed: X  Electrocardiogram Personally Reviewed: X

## 2025-04-08 NOTE — DISCHARGE NOTE FOR THE EXPIRED PATIENT - HOSPITAL COURSE
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 [facility] on admission re-demonstrating aneurysmal dilation w/ no intervention performed. Admitted to CCU for further management.    Hospital Course:  Patient intubated, sedated. CT head demonstrating diffuse anoxia. No response to noxious stimuli, no mental status, no purposeful movement, questionable gag reflex, otherwise no other brain stem reflexes. Hospital course c/b tonic/clonic seizures possibly 2.2 to anoxic brain injury. EEG c/w severe diffuse cerebral dysfunction, nonspecific in etiology, abundant rhythmic/periodic patterns mostly in the left frontal region, at times generalizing. Patient w/ persistent fevers, completed 10x day course of empiric zosyn, but fevers persistent, thought to be central in etiology. Patient's mother (also her Health Care Proxy) signed DNR, no pressures on . Patient on registry for organ donation -- q6 labs checked per organ procurement protocol. Hgb 6.7 day of palliative extubation -> transfused 2U pRBCs per organ donation organization. Patient to OR for palliative extubation and organ procurement on . Patient  at 2025. 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 [facility] on admission re-demonstrating aneurysmal dilation w/ no intervention performed. Admitted to CCU for further management.    Hospital Course:  Patient intubated, sedated. CT head demonstrating diffuse anoxia. No response to noxious stimuli, no mental status, no purposeful movement, questionable gag reflex, otherwise no other brain stem reflexes. Hospital course c/b tonic/clonic seizures possibly 2.2 to anoxic brain injury. EEG c/w severe diffuse cerebral dysfunction, nonspecific in etiology, abundant rhythmic/periodic patterns mostly in the left frontal region, at times generalizing. Patient w/ persistent fevers, completed 10x day course of empiric zosyn, but fevers persistent, thought to be central in etiology. Patient's mother (also her Health Care Proxy) signed DNR, no pressures on 04/08. Patient on registry for organ donation -- q6 labs checked per organ procurement protocol. Hgb 6.7 day of palliative extubation -> transfused 2U pRBCs per organ donation organization. Patient taken to OR for palliative extubation and organ procurement on 04/08. Patient was terminally extubated at 19:09 4/8/25. Asystole was observed at 20:25. No pulse, no respirations. No sounds heard on auscultation. Observation period for two minutes as per [facility] policy. Patient time of death 04/08/2025 20:27.  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 [facility] on admission re-demonstrating aneurysmal dilation w/ no intervention performed. Admitted to CCU for further management.    Hospital Course:  Patient intubated, sedated. CT head demonstrating diffuse anoxia. No response to noxious stimuli, no mental status, no purposeful movement, questionable gag reflex, otherwise no other brain stem reflexes. Hospital course c/b tonic/clonic seizures possibly 2.2 to anoxic brain injury. EEG c/w severe diffuse cerebral dysfunction, nonspecific in etiology, abundant rhythmic/periodic patterns mostly in the left frontal region, at times generalizing. Patient w/ persistent fevers, completed 10x day course of empiric zosyn, but fevers persistent, thought to be central in etiology. Patient's mother (also her Health Care Proxy) signed DNR, no pressors on 03/31. Patient on registry for organ donation -- q6 labs checked per organ procurement protocol. Hgb 6.7 day of palliative extubation -> transfused 2U pRBCs per organ donation organization. Patient taken to OR for palliative extubation and organ procurement on 04/08. Patient was terminally extubated at 19:09 4/8/25. Asystole was observed at 20:25. No pulse, no respirations. No sounds heard on auscultation. Observation period for two minutes as per [facility] policy. Patient time of death 04/08/2025 20:27.  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 [facility] on admission re-demonstrating aneurysmal dilation w/ no intervention performed. Admitted to CCU for further management.    Hospital Course:  Patient intubated, sedated. CT head demonstrating diffuse anoxia. No response to noxious stimuli, no mental status, no purposeful movement, questionable gag reflex, otherwise no other brain stem reflexes. Hospital course c/b tonic/clonic seizures possibly 2.2 to anoxic brain injury. EEG c/w severe diffuse cerebral dysfunction, nonspecific in etiology, abundant rhythmic/periodic patterns mostly in the left frontal region, at times generalizing. Patient w/ persistent fevers, completed 10x day course of empiric zosyn, but fevers persistent, thought to be central in etiology. Patient's mother (also her Health Care Proxy) signed DNR, no pressors on 03/31. Patient on registry for organ donation -- q6 labs checked per organ procurement protocol. Hgb 6.7 day of palliative extubation -> transfused 2U pRBCs per organ donation organization. MEDICAL EXAMINER was called and this was deemed not a reportable case; CASE NUMBER: [id number]  Patient taken to OR for palliative extubation and organ procurement on 04/08. Patient was terminally extubated at 19:09 4/8/25. Asystole was observed at 20:25. No pulse, no respirations. No sounds heard on auscultation. Observation period for two minutes as per [facility] policy. Patient time of death 04/08/2025 20:27.  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 [facility] on admission re-demonstrating aneurysmal dilation w/ no intervention performed. Admitted to CCU for further management.    Hospital Course:  Patient intubated, sedated. CT head demonstrating diffuse anoxia. No response to noxious stimuli, no mental status, no purposeful movement, questionable gag reflex, otherwise no other brain stem reflexes. Hospital course c/b tonic/clonic seizures possibly 2.2 to anoxic brain injury. EEG c/w severe diffuse cerebral dysfunction, nonspecific in etiology, abundant rhythmic/periodic patterns mostly in the left frontal region, at times generalizing. Patient w/ persistent fevers, completed 10x day course of empiric zosyn, but fevers persistent, thought to be central in etiology. Patient's mother (also her Health Care Proxy) signed DNR, no pressors on 03/31. Patient on registry for organ donation -- q6 labs checked per organ procurement protocol. Hgb 6.7 day of palliative extubation -> transfused 2U pRBCs per organ donation organization. MEDICAL EXAMINER was called and this was deemed not a reportable case; CASE NUMBER: [id number]  Patient taken to OR for palliative extubation and organ procurement on 04/08. Patient was terminally extubated at 19:09 4/8/25. Asystole was observed at 20:25. No pulse, no respirations. No sounds heard on auscultation. Observation period for two minutes as per hospital policy. Patient time of death 04/08/2025 20:27.

## 2025-04-08 NOTE — PROGRESS NOTE ADULT - ASSESSMENT
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 [facility] on admission re-demonstrating aneurysmal dilation w/ no intervention performed. Currently intubated, no on sedation, no response to noxious stimuli, no mental status, no purposeful movement, questionable gag reflex, otherwise no other brain stem reflexes. Hospital course c/b tonic/clonic seizures possibly 2.2 to anoxic brain injury. Poor prognosis for recovery, ongoing GOCs w/ family. Plan for palliative extubation in OR for organ procurement 04/08 at 18:00.     NEURO  -Baseline: alert and oriented x3, wheelchair bound    ## Diffuse cerebral anoxic injury v seizures   - On prop since 04/02. Previously, off sedation.  - Neuro consulted: appreciate recs   > CTH (3/30): w/ diffuse loss of gray-white matter c/w global anoxic brain injury & diffuse enhancement of hemispheric sulci and basal cisterns c/w elevate supratentorial pressures.   > neuron specific enolase (3/28, 3/29, 3/30; 24, 48, 72 hours post-arrest): 68, 160, 402  > Video EEG (3/30): Severe diffuse/multi-focal cerebral dysfunction, worse in the right hemisphere. Right sided clonic jaw/facial movements may represent EPC/focal motor seizure activity, with limited EEG.  > Delium w/u (Vit. b 12, folate, B6, thiamine, ETOH level, troponin level, LFT, TSH, Urine toxicology): non-revealing.  > General Neuro check q12  - Patient wishes carried out by family, patient now a DNR  - Goals of care established  - Palliative extubation on 04/08.    PULM  ## Respiratory failure due to cardiac arrest  - CXR daily  - Clear lungs.    CARDS  TTE (03/28):   > LVSF w/ normal EF 55 to 60 %. Hypokinesis of the basal inferoseptal, basal inferolateral and basal inferior walls. Mild (grade 1) LV diastolic dysfunction.  - ProBNP 1268     ## Vfib Arrest  - Last [facility] 2021: Mild CAD; Aneurysms in LM, LAD, CX, RCA.  - Trops 2500, CKMB 160 CKMB index 4; EKG w/ SRIKANTH in inferior leads  - [facility] Diagnostic Conclusions (3/27): Ectatic LCA, difficulty to opacify LAD and Cx. Severe LV systolic dysfunction w/ moderate-severe MR.  - HOLD (04/02) Eliquis 5 BID (previously on for LV Thrombus) per family request given menstrual bleeding for patient comfort    GI  ## OGT in place  - c/w tube feeds, jevity  - Nutrition c/s'd: appreciate recs      ## TISH   - creatinine 1.14 in 2021  - sCr elevated on admission; now UPTRENDING.     ENDO  - HgA1c 5.3  - TSH 0.73    ID  ## Persistent fevers,  - Persistent Leukocytosis, though downtrending.  - Procalcitonin 1.68  - s/p 10x day course of empiric Zosyn   - restarted on Zosyn 04/04  - BCx: NGTD  - UCx: NGTD    HEME  #History of coronary thrombus   - HOLD (04/02) Eliquis 5 BID (previously on for LV Thrombus) per family request given menstrual bleeding for patient comfort    PALLIATIVE  #GOC  - Active GOCs w/ family regarding prognosis (brain death) and further interventions.  - Family is aware of grave prognosis with no meaningful recovery and opting to do a palliative extubation  - Palliative extubation in OR for organ procurement 18:00  - CODE: Full code    MEDICAL EXAMINER CALLED, the  name is Ahmed: THIS IS NOT A REPORTABLE CASE  Case [id number]

## 2025-04-08 NOTE — PROGRESS NOTE ADULT - SUBJECTIVE AND OBJECTIVE BOX
--------------------------  Des Rdz MD  Internal Medicine   PGY-1  --------------------------    46 y/o wheelchair bound female with PMHx of HTN, CVAx2 (2015) s/p trach and PEG (reversed)c with residual right sided weakness, large coronary artery aneurysms (2021) anterior and superior to LV with calcification/thrombus within wall compressing LA as well as proximal dilatation of RCA. 2021 TTE: normal biventricular function, [facility] 2021. showed large saccular aneurysm of proximal LM with LAD and LCx coming off confluence and medium localized aneurysm of RCA. Per CT surgery, notCABG or transplant candidate. 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 When EMS arrived she had no pulse and CPR was initiated, ROSC after 20-25minutes. Unclear downtime prior to initiation of CPR but EMS arrived 20minutes after they were called. Total downtime could be up to 40-45minutes. She was defibrillated for Vfib. Stabilized at [facility] and brought to [facility]. She had a LHC on admission, no intervention performed. Transferred to CCU after LHC without intervention (no intervention).     SUBJECTIVE / OVERNIGHT EVENTS:    Pt seen in AM at bedside, intubated, on sedation. Unable to participate in ROS. (+) gag reflex; otherwise, no brainstem reflexes elicited/mental status unchanged.  - Plan for palliative extubation in OR for organ procurement at 18:00 today, 04/08.    MEDICATIONS  (STANDING):  artificial  tears Solution 1 Drop(s) Both EYES every 2 hours  chlorhexidine 0.12% Liquid 15 milliLiter(s) Oral Mucosa every 12 hours  chlorhexidine 2% Cloths 1 Application(s) Topical <User Schedule>  fluconAZOLE   Tablet 400 milliGRAM(s) Oral daily  hydrALAZINE 25 milliGRAM(s) Oral three times a day  isosorbide   dinitrate Tablet (ISORDIL) 10 milliGRAM(s) Oral three times a day  lacosamide Solution 300 milliGRAM(s) Oral two times a day  metoprolol tartrate 25 milliGRAM(s) Oral two times a day  pantoprazole  Injectable 40 milliGRAM(s) IV Push daily  piperacillin/tazobactam IVPB.. 3.375 Gram(s) IV Intermittent every 8 hours  potassium chloride   Powder 40 milliEquivalent(s) Oral once  propofol Infusion 10 MICROgram(s)/kG/Min (3.9 mL/Hr) IV Continuous <Continuous>  scopolamine 1 mG/72 Hr(s) Patch 1 Patch Transdermal every 72 hours  senna 2 Tablet(s) Oral at bedtime    MEDICATIONS  (PRN):  acetaminophen     Tablet .. 650 milliGRAM(s) Oral every 6 hours PRN Temp greater or equal to 38C (100.4F)  glycopyrrolate Injectable 0.2 milliGRAM(s) IV Push every 6 hours PRN Secretions  polyethylene glycol 3350 17 Gram(s) Oral every 24 hours PRN Constipation  psyllium Powder 1 Packet(s) Oral three times a day PRN Constipation    CAPILLARY BLOOD GLUCOSE    I&O's Summary    07 Apr 2025 07:01  -  08 Apr 2025 07:00  --------------------------------------------------------  IN: 1259.7 mL / OUT: 1230 mL / NET: 29.7 mL    PHYSICAL EXAM:  Vital Signs Last 24 Hrs  T(C): 37.9 (08 Apr 2025 04:00), Max: 38.1 (07 Apr 2025 23:00)  T(F): 100.2 (08 Apr 2025 04:00), Max: 100.6 (07 Apr 2025 23:00)  HR: 108 (08 Apr 2025 07:00) (101 - 126)  RR: 12 (08 Apr 2025 07:00) (12 - 16)  SpO2: 100% (08 Apr 2025 07:00) (99% - 100%)    Parameters below as of 08 Apr 2025 07:00  Patient On (Oxygen Delivery Method): ventilator    O2 Concentration (%): 50    HEAD: Atraumatic, Normocephalic  EYES: EOMI, pinpoint pupils, non reactive, conjunctiva and sclera clear. No corneal reflex  ENT: Moist mucous membranes  NECK: Well healed tracheostomy scar   CHEST/LUNG: Clear to auscultation bilaterally; No rales, rhonchi, wheezing, or rubs. Unlabored respirations  HEART: Regular rate and rhythm; No murmurs, rubs, or gallops  ABDOMEN: BSx4; Soft, nontender, nondistended  EXTREMITIES:  2+ Peripheral Pulses, brisk capillary refill. No clubbing, cyanosis, or edema  NERVOUS SYSTEM:  Unresponsive to painful stimuli. (+) gag reflex, (-) no corneal reflex. (+) Facial myoclonus of chin/mouth  SKIN: No rashes or lesions    LABS:                        9.0    14.51 )-----------( 336      ( 08 Apr 2025 06:20 )             26.6     04-08    136  |  100  |  28[H]  ----------------------------<  106[H]  3.3[L]   |  23  |  1.38[H]    Ca    8.9      08 Apr 2025 06:20  Phos  2.9     04-08  Mg     2.20     04-08    TPro  6.6  /  Alb  3.2[L]  /  TBili  0.6  /  DBili  <0.2  /  AST  66[H]  /  ALT  66[H]  /  AlkPhos  57  04-08    PT/INR - ( 08 Apr 2025 06:20 )   PT: 12.9 sec;   INR: 1.08 ratio    PTT - ( 08 Apr 2025 06:20 )  PTT:29.8 sec    Urinalysis Basic - ( 08 Apr 2025 06:20 )  Color: x / Appearance: x / SG: x / pH: x  Gluc: 106 mg/dL / Ketone: x  / Bili: x / Urobili: x   Blood: x / Protein: x / Nitrite: x   Leuk Esterase: x / RBC: x / WBC x   Sq Epi: x / Non Sq Epi: x / Bacteria: x    Culture - Fungal, Bronchial (collected 05 Apr 2025 12:10)  Source: Bronchial Bronchial Lavage  Preliminary Report (07 Apr 2025 10:35):    Few Yeast    Culture - Bronchial (collected 05 Apr 2025 12:10)  Source: Bronchial Bronchial Lavage  Gram Stain (05 Apr 2025 23:11):    Few polymorphonuclear leukocytes per low power field    No squamous epithelial cells per low power field    No organisms seen per oil power field  Final Report (06 Apr 2025 22:54):    Commensal carrol consistent with body site    RADIOLOGY & ADDITIONAL TESTS:  Results Reviewed: X  Imaging Personally Reviewed: X  Electrocardiogram Personally Reviewed: X

## 2025-04-08 NOTE — DISCHARGE NOTE FOR THE EXPIRED PATIENT - NS EXPIRED FAMCONTACTED
yes
SBO identified on admission CT scan on 2/18/17, patient managed medically with NGT placement to suction. NGT dislodged by family member 2/22.   - Plan for repeat CT abdomen today to evaluate status of SBO, need for NGT reinsertion versus venting gastrostomy tube versus initiation of diet  - pain control and supportive care

## 2025-04-08 NOTE — PROGRESS NOTE ADULT - CRITICAL CARE ATTENDING COMMENT
45 year old woman with history of HTN, hemorrhagic CVA with residual defects, large coronary aneurysms (2021) anterior and superior to LV with calcification/thrombus within wall compressing LA as well as proximal dilatation of RCA. TTE: normal biventricular function. LHC showed large saccular aneurysm of proximal LM with LAD and LCx coming off confluence and medium localized aneurysm of RCA. Per CT surgery, determined to not be a CABG or transplant candidate. 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 Presented after syncope in doctors office. When EMS arrived 20 min later- pulseless-->intubated. Transferred from [facility] for [facility]- no interventions performed.  No neurologic improvement over weekend  Awaiting organ donation      -Plan for organ donation this week; timing dependent on family  -continue vent support
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 EMS arrived to find her pulseless, performed CPR for 20-25 minutes prior to return of spontaneous circulation, and defibrillated her for ventricular fibrillation. While the exact downtime is unknown, it is estimated to be as long as 40-45 minutes. She was stabilized at [facility] and transferred to [facility], where a LHC was performed without intervention, confirming the previously known coronary aneurysms. She was subsequently admitted to the CCU. On examination, she is intubated, sedated, and demonstrates a gag reflex, but no other brainstem reflexes. Her hospital course has been complicated by tonic-clonic seizures, thought to be secondary to anoxic brain injury. A CT head showed diffuse loss of grey-white matter differentiation consistent with global anoxic injury and diffuse enhancement of the hemispheric sulci and basal cisterns suggestive of elevated intracranial pressure, but without herniation or midline shift. Neuron-specific enolase levels have been trending upward, and a video EEG showed diffuse cerebral dysfunction. A delirium workup was unrevealing. She also developed TISH, persistent fevers, leukocytosis, and a procalcitonin of 1.68, for which she completed a 10-day course of Zosyn. Blood and urine cultures were negative. Eliquis, which she was previously taking for a left ventricular thrombus, was held at the family's request due to menstrual bleeding. The family is actively engaged in goals of care discussions, and the patient remains full code. Her prognosis is poor
45 year old woman with history of HTN, hemorrhagic CVA with residual defects, large coronary aneurysms (2021) anterior and superior to LV with calcification/thrombus within wall compressing LA as well as proximal dilatation of RCA. TTE: normal biventricular function. LHC showed large saccular aneurysm of proximal LM with LAD and LCx coming off confluence and medium localized aneurysm of RCA. Per CT surgery, determined to not be a CABG or transplant candidate. 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 Presented after syncope in doctors office. When EMS arrived 20 min later- pulseless-->intubated. Transferred from [facility] for [facility]- no interventions performed.  No neurologic improvement over weekend  Awaiting organ donation      -Plan for organ donation today  -continue vent support- decrease FIO2 as tolerates  -transfuse as needed
45 year old woman with history of HTN, hemorrhagic CVA with residual defects, large coronary aneurysms (2021) anterior and superior to LV with calcification/thrombus within wall compressing LA as well as proximal dilatation of RCA. TTE: normal biventricular function. LHC showed large saccular aneurysm of proximal LM with LAD and LCx coming off confluence and medium localized aneurysm of RCA. Per CT surgery, determined to not be a CABG or transplant candidate. 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 Presented after syncope in doctors office. When EMS arrived 20 min later- pulseless-->intubated. Transferred from [facility] for [facility]- no interventions performed.  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
45 year old woman with history of HTN, hemorrhagic CVA with residual defects, large coronary aneurysms (2021) anterior and superior to LV with calcification/thrombus within wall compressing LA as well as proximal dilatation of RCA. TTE: normal biventricular function. LHC showed large saccular aneurysm of proximal LM with LAD and LCx coming off confluence and medium localized aneurysm of RCA. Per CT surgery, determined to not be a CABG or transplant candidate. 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 Presented after syncope in doctors office. When EMS arrived 20 min later- pulseless-->intubated. Transferred from [facility] for [facility]- no interventions performed.    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
45 year old woman with history of HTN, hemorrhagic CVA with residual defects, large coronary aneurysms (2021) anterior and superior to LV with calcification/thrombus within wall compressing LA as well as proximal dilatation of RCA. TTE: normal biventricular function. LHC showed large saccular aneurysm of proximal LM with LAD and LCx coming off confluence and medium localized aneurysm of RCA. Per CT surgery, determined to not be a CABG or transplant candidate. 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 Presented after syncope in doctors office. When EMS arrived 20 min later- pulseless-->intubated. Transferred from [facility] for [facility]- no interventions performed.  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
45 year old woman with history of HTN, hemorrhagic CVA with residual defects, large coronary aneurysms (2021) anterior and superior to LV with calcification/thrombus within wall compressing LA as well as proximal dilatation of RCA. TTE: normal biventricular function. LHC showed large saccular aneurysm of proximal LM with LAD and LCx coming off confluence and medium localized aneurysm of RCA. Per CT surgery, determined to not be a CABG or transplant candidate. 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 Presented after syncope in doctors office. When EMS arrived 20 min later- pulseless-->intubated. Transferred from [facility] for [facility]- no interventions performed.  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
45 year old woman with history of HTN, hemorrhagic CVA with residual defects, large coronary aneurysms (2021) anterior and superior to LV with calcification/thrombus within wall compressing LA as well as proximal dilatation of RCA. TTE: normal biventricular function. LHC showed large saccular aneurysm of proximal LM with LAD and LCx coming off confluence and medium localized aneurysm of RCA. Per CT surgery, determined to not be a CABG or transplant candidate. 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 Presented after syncope in doctors office. When EMS arrived 20 min later- pulseless-->intubated. Transferred from [facility] for [facility]- no interventions performed.  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 Defibrillated for VF, stabilized at outside hospital, then transferred. LHC showed aneurysmal dilation, no intervention performed. Currently intubated, sedated, no response to noxious stimuli, no purposeful movement, questionable gag.  TTE (3/28) showed LVSF, EF 55-60%, hypokinesis of basal inferoseptal, inferolateral, and inferior walls, and mild LV diastolic dysfunction. ProBNP 1268 pg/mL. LHC (3/27) showed ectatic LCA, difficulty opacifying LAD/Cx, severe LV systolic dysfunction, and moderate-severe MR.  Luis held 2/2 family request for menstrual bleeding.  Plan: Palliative care consulted. Pt DNR per family.  Continue tube feeds. Nutrition consult obtained. TISH improved, renal function normal.  CXR daily, lungs clear.  HgA1c 5.3, TSH 0.73.  Persistent leukocytosis downtrending, procalcitonin 1.68, s/p Zosyn x10 days, BCx/UCx negative
45 year old woman with history of HTN, hemorrhagic CVA with residual defects, large coronary aneurysms (2021) anterior and superior to LV with calcification/thrombus within wall compressing LA as well as proximal dilatation of RCA. TTE: normal biventricular function. LHC showed large saccular aneurysm of proximal LM with LAD and LCx coming off confluence and medium localized aneurysm of RCA. Per CT surgery, determined to not be a CABG or transplant candidate. 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 Presented after syncope in doctors office. When EMS arrived 20 min later- pulseless-->intubated. Transferred from [facility] for [facility]- no interventions performed.  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

## 2025-04-08 NOTE — CHART NOTE - NSCHARTNOTEFT_GEN_A_CORE
Patient taken to OR for palliative extubation and organ procurement on 04/08.   Patient was terminally extubated at 19:09 4/8/25.   Asystole was observed at 20:25.   No pulse, no respirations. No sounds heard on auscultation. Pupils fixed and dilated. Observation period for two minutes as per [facility] policy.   Patient time of death 04/08/2025 20:27. Patient taken to OR for palliative extubation and organ procurement on 04/08.   Patient was terminally extubated at 19:09 4/8/25.   Asystole was observed at 20:25.   No pulse, no respirations. No sounds heard on auscultation. Pupils fixed and dilated. Observation period for two minutes as per hospital policy.   Patient time of death 04/08/2025 20:27.

## 2025-04-09 LAB
FIBRINOGEN AG PPP IA-MCNC: 687 MG/DL — HIGH (ref 206–478)
FIBRINOGEN AG PPP IA-MCNC: 705 MG/DL — HIGH (ref 206–478)
FIBRINOGEN AG PPP IA-MCNC: 789 MG/DL — HIGH (ref 206–478)
FIBRINOGEN AG PPP IA-MCNC: 836 MG/DL — HIGH (ref 206–478)
FIBRINOGEN AG PPP IA-MCNC: 836 MG/DL — HIGH (ref 206–478)

## 2025-04-10 LAB
FIBRINOGEN AG PPP IA-MCNC: 682 MG/DL — HIGH (ref 206–478)
FIBRINOGEN AG PPP IA-MCNC: 791 MG/DL — HIGH (ref 206–478)
FIBRINOGEN AG PPP IA-MCNC: 829 MG/DL — HIGH (ref 206–478)

## 2025-04-11 LAB — FIBRINOGEN AG PPP IA-MCNC: 768 MG/DL — HIGH (ref 206–478)

## 2025-04-30 LAB
CULTURE RESULTS: ABNORMAL
SPECIMEN SOURCE: SIGNIFICANT CHANGE UP

## (undated) DEVICE — PREP CHLORAPREP HI-LITE ORANGE 26ML

## (undated) DEVICE — WARMING BLANKET LOWER ADULT

## (undated) DEVICE — NDL BIOPSY TRU-CUT 14G X 6"

## (undated) DEVICE — PACK MAJOR ABDOMINAL SUPINE

## (undated) DEVICE — GOWN LG

## (undated) DEVICE — GLV 8.5 PROTEXIS (WHITE)

## (undated) DEVICE — ELCTR BOVIE PENCIL SMOKE EVACUATION

## (undated) DEVICE — DRAPE SPLIT SHEET 77" X 108"

## (undated) DEVICE — TUBING TUR 2 PRONG

## (undated) DEVICE — GLV 7.5 PROTEXIS (WHITE)

## (undated) DEVICE — DRAPE SLUSH / WARMER 44 X 66"

## (undated) DEVICE — PACK POST MORTEM ADULT

## (undated) DEVICE — GOWN TRIMAX LG

## (undated) DEVICE — SYR ASEPTO

## (undated) DEVICE — STAPLER SKIN VISI-STAT 35 WIDE

## (undated) DEVICE — DRAPE MAYO STAND 30"

## (undated) DEVICE — WARMING BLANKET UPPER ADULT

## (undated) DEVICE — DRAPE TOWEL BLUE 17" X 24"

## (undated) DEVICE — GLV 6.5 PROTEXIS (WHITE)

## (undated) DEVICE — SUT PROLENE 3-0 36" SH

## (undated) DEVICE — SUT SOFSILK 2-0 30" TIES

## (undated) DEVICE — MEDICATION LABELS W MARKER

## (undated) DEVICE — VENODYNE/SCD SLEEVE CALF LARGE

## (undated) DEVICE — DRAPE INSTRUMENT POUCH 6.75" X 11"

## (undated) DEVICE — DRAPE WARMING SOLUTION 44 X 44"

## (undated) DEVICE — SPECIMEN CONTAINER 100ML

## (undated) DEVICE — BLADE SCALPEL SAFETYLOCK #10

## (undated) DEVICE — POSITIONER FOAM EGG CRATE ULNAR 2PCS (PINK)

## (undated) DEVICE — DRAPE 3/4 SHEET W REINFORCEMENT 56X77"

## (undated) DEVICE — GLV 8 PROTEXIS (WHITE)

## (undated) DEVICE — SUT SILK 0 30" TIES

## (undated) DEVICE — SOL IRR POUR H2O 250ML

## (undated) DEVICE — TUBING SUCTION 20FT

## (undated) DEVICE — BLADE SCALPEL SAFETYLOCK #15

## (undated) DEVICE — DRAPE LIGHT HANDLE COVER (BLUE)

## (undated) DEVICE — SOL IRR POUR NS 0.9% 500ML

## (undated) DEVICE — LAP PAD 18 X 18"

## (undated) DEVICE — DRSG TAPE UMBILICAL COTTON 2" X 30 X 1/8"

## (undated) DEVICE — DRAPE 1/2 SHEET 40X57"

## (undated) DEVICE — SUCTION YANKAUER NO CONTROL VENT

## (undated) DEVICE — SUT PDS II 1 54" TP-1

## (undated) DEVICE — DRAPE ISOLATION BAG 20X20"

## (undated) DEVICE — SUT SOFSILK 3-0 30" TIES

## (undated) DEVICE — ELCTR BOVIE PENCIL HANDPIECE

## (undated) DEVICE — SUT SOFSILK 4-0 30" TIES

## (undated) DEVICE — GLV 7 PROTEXIS (WHITE)

## (undated) DEVICE — SAW BLADE STRYKER STERNUM 31MM X 6.27 X .79

## (undated) DEVICE — ELCTR HEX BLADE